# Patient Record
Sex: FEMALE | Race: WHITE | Employment: OTHER | URBAN - METROPOLITAN AREA
[De-identification: names, ages, dates, MRNs, and addresses within clinical notes are randomized per-mention and may not be internally consistent; named-entity substitution may affect disease eponyms.]

---

## 2019-10-03 ASSESSMENT — ENCOUNTER SYMPTOMS
NAUSEA: 0
CONSTIPATION: 0
DIARRHEA: 0
SORE THROAT: 0
SHORTNESS OF BREATH: 0
VOMITING: 0
BACK PAIN: 0
SINUS PAIN: 0
WHEEZING: 0
COUGH: 0
BLOOD IN STOOL: 0

## 2019-10-07 ENCOUNTER — OFFICE VISIT (OUTPATIENT)
Dept: INTERNAL MEDICINE CLINIC | Age: 59
End: 2019-10-07
Payer: MEDICARE

## 2019-10-07 VITALS
WEIGHT: 265 LBS | HEART RATE: 63 BPM | SYSTOLIC BLOOD PRESSURE: 128 MMHG | OXYGEN SATURATION: 97 % | DIASTOLIC BLOOD PRESSURE: 86 MMHG | HEIGHT: 64 IN | BODY MASS INDEX: 45.24 KG/M2

## 2019-10-07 DIAGNOSIS — K21.9 GASTROESOPHAGEAL REFLUX DISEASE, ESOPHAGITIS PRESENCE NOT SPECIFIED: ICD-10-CM

## 2019-10-07 DIAGNOSIS — Z76.89 ENCOUNTER TO ESTABLISH CARE WITH NEW DOCTOR: Primary | ICD-10-CM

## 2019-10-07 DIAGNOSIS — E78.49 OTHER HYPERLIPIDEMIA: ICD-10-CM

## 2019-10-07 DIAGNOSIS — J30.89 SEASONAL ALLERGIC RHINITIS DUE TO OTHER ALLERGIC TRIGGER: ICD-10-CM

## 2019-10-07 DIAGNOSIS — M79.7 FIBROMYALGIA: ICD-10-CM

## 2019-10-07 DIAGNOSIS — F32.89 OTHER DEPRESSION: ICD-10-CM

## 2019-10-07 DIAGNOSIS — E03.8 OTHER SPECIFIED HYPOTHYROIDISM: ICD-10-CM

## 2019-10-07 DIAGNOSIS — R73.03 PREDIABETES: ICD-10-CM

## 2019-10-07 DIAGNOSIS — Z13.31 POSITIVE DEPRESSION SCREENING: ICD-10-CM

## 2019-10-07 DIAGNOSIS — Z12.31 BREAST CANCER SCREENING BY MAMMOGRAM: ICD-10-CM

## 2019-10-07 DIAGNOSIS — Z11.4 SCREENING FOR HIV (HUMAN IMMUNODEFICIENCY VIRUS): ICD-10-CM

## 2019-10-07 DIAGNOSIS — J45.909 UNCOMPLICATED ASTHMA, UNSPECIFIED ASTHMA SEVERITY, UNSPECIFIED WHETHER PERSISTENT: ICD-10-CM

## 2019-10-07 DIAGNOSIS — G47.39 OTHER SLEEP APNEA: ICD-10-CM

## 2019-10-07 DIAGNOSIS — I10 ESSENTIAL HYPERTENSION: ICD-10-CM

## 2019-10-07 PROCEDURE — 99386 PREV VISIT NEW AGE 40-64: CPT | Performed by: NURSE PRACTITIONER

## 2019-10-07 PROCEDURE — G8431 POS CLIN DEPRES SCRN F/U DOC: HCPCS | Performed by: NURSE PRACTITIONER

## 2019-10-07 PROCEDURE — G8484 FLU IMMUNIZE NO ADMIN: HCPCS | Performed by: NURSE PRACTITIONER

## 2019-10-07 RX ORDER — MONTELUKAST SODIUM 4 MG/1
1 TABLET, CHEWABLE ORAL
COMMUNITY
Start: 2019-07-29 | End: 2020-01-08 | Stop reason: SDUPTHER

## 2019-10-07 RX ORDER — METOPROLOL SUCCINATE 25 MG/1
25 TABLET, EXTENDED RELEASE ORAL 2 TIMES DAILY
COMMUNITY
Start: 2019-07-29 | End: 2020-01-08 | Stop reason: SDUPTHER

## 2019-10-07 RX ORDER — LEVOTHYROXINE SODIUM 112 UG/1
112 TABLET ORAL DAILY
COMMUNITY
Start: 2019-06-20 | End: 2020-01-08 | Stop reason: SDUPTHER

## 2019-10-07 RX ORDER — GABAPENTIN 300 MG/1
300 CAPSULE ORAL 3 TIMES DAILY
COMMUNITY
Start: 2019-08-20 | End: 2019-10-23 | Stop reason: SDUPTHER

## 2019-10-07 RX ORDER — DULOXETIN HYDROCHLORIDE 60 MG/1
60 CAPSULE, DELAYED RELEASE ORAL DAILY
COMMUNITY
Start: 2019-06-20 | End: 2019-12-04 | Stop reason: SDUPTHER

## 2019-10-07 RX ORDER — PANTOPRAZOLE SODIUM 40 MG/1
1 TABLET, DELAYED RELEASE ORAL DAILY
COMMUNITY
Start: 2019-09-03 | End: 2019-10-23

## 2019-10-07 RX ORDER — BUSPIRONE HYDROCHLORIDE 10 MG/1
10 TABLET ORAL DAILY
COMMUNITY
Start: 2019-09-03 | End: 2020-01-08

## 2019-10-07 ASSESSMENT — PATIENT HEALTH QUESTIONNAIRE - PHQ9
3. TROUBLE FALLING OR STAYING ASLEEP: 1
SUM OF ALL RESPONSES TO PHQ QUESTIONS 1-9: 10
9. THOUGHTS THAT YOU WOULD BE BETTER OFF DEAD, OR OF HURTING YOURSELF: 0
8. MOVING OR SPEAKING SO SLOWLY THAT OTHER PEOPLE COULD HAVE NOTICED. OR THE OPPOSITE, BEING SO FIGETY OR RESTLESS THAT YOU HAVE BEEN MOVING AROUND A LOT MORE THAN USUAL: 0
SUM OF ALL RESPONSES TO PHQ QUESTIONS 1-9: 10
1. LITTLE INTEREST OR PLEASURE IN DOING THINGS: 1
4. FEELING TIRED OR HAVING LITTLE ENERGY: 2
SUM OF ALL RESPONSES TO PHQ9 QUESTIONS 1 & 2: 2
7. TROUBLE CONCENTRATING ON THINGS, SUCH AS READING THE NEWSPAPER OR WATCHING TELEVISION: 0
6. FEELING BAD ABOUT YOURSELF - OR THAT YOU ARE A FAILURE OR HAVE LET YOURSELF OR YOUR FAMILY DOWN: 3
5. POOR APPETITE OR OVEREATING: 2
10. IF YOU CHECKED OFF ANY PROBLEMS, HOW DIFFICULT HAVE THESE PROBLEMS MADE IT FOR YOU TO DO YOUR WORK, TAKE CARE OF THINGS AT HOME, OR GET ALONG WITH OTHER PEOPLE: 1
2. FEELING DOWN, DEPRESSED OR HOPELESS: 1

## 2019-10-16 DIAGNOSIS — E78.49 OTHER HYPERLIPIDEMIA: ICD-10-CM

## 2019-10-16 DIAGNOSIS — Z11.4 SCREENING FOR HIV (HUMAN IMMUNODEFICIENCY VIRUS): ICD-10-CM

## 2019-10-16 DIAGNOSIS — M79.7 FIBROMYALGIA: ICD-10-CM

## 2019-10-16 DIAGNOSIS — E03.8 OTHER SPECIFIED HYPOTHYROIDISM: ICD-10-CM

## 2019-10-16 DIAGNOSIS — I10 ESSENTIAL HYPERTENSION: ICD-10-CM

## 2019-10-16 DIAGNOSIS — R73.03 PREDIABETES: ICD-10-CM

## 2019-10-16 LAB
A/G RATIO: 2.3 (ref 1.1–2.2)
ALBUMIN SERPL-MCNC: 4.5 G/DL (ref 3.4–5)
ALP BLD-CCNC: 83 U/L (ref 40–129)
ALT SERPL-CCNC: 19 U/L (ref 10–40)
ANION GAP SERPL CALCULATED.3IONS-SCNC: 13 MMOL/L (ref 3–16)
AST SERPL-CCNC: 17 U/L (ref 15–37)
BASOPHILS ABSOLUTE: 0.1 K/UL (ref 0–0.2)
BASOPHILS RELATIVE PERCENT: 1.2 %
BILIRUB SERPL-MCNC: 0.3 MG/DL (ref 0–1)
BUN BLDV-MCNC: 12 MG/DL (ref 7–20)
C-REACTIVE PROTEIN: 3.1 MG/L (ref 0–5.1)
CALCIUM SERPL-MCNC: 9.8 MG/DL (ref 8.3–10.6)
CHLORIDE BLD-SCNC: 103 MMOL/L (ref 99–110)
CHOLESTEROL, FASTING: 214 MG/DL (ref 0–199)
CO2: 26 MMOL/L (ref 21–32)
CREAT SERPL-MCNC: 0.7 MG/DL (ref 0.6–1.1)
EOSINOPHILS ABSOLUTE: 0.1 K/UL (ref 0–0.6)
EOSINOPHILS RELATIVE PERCENT: 1.3 %
GFR AFRICAN AMERICAN: >60
GFR NON-AFRICAN AMERICAN: >60
GLOBULIN: 2 G/DL
GLUCOSE BLD-MCNC: 89 MG/DL (ref 70–99)
HCT VFR BLD CALC: 38 % (ref 36–48)
HDLC SERPL-MCNC: 65 MG/DL (ref 40–60)
HEMOGLOBIN: 12.4 G/DL (ref 12–16)
LDL CHOLESTEROL CALCULATED: 109 MG/DL
LYMPHOCYTES ABSOLUTE: 1.5 K/UL (ref 1–5.1)
LYMPHOCYTES RELATIVE PERCENT: 33.2 %
MCH RBC QN AUTO: 29 PG (ref 26–34)
MCHC RBC AUTO-ENTMCNC: 32.5 G/DL (ref 31–36)
MCV RBC AUTO: 89 FL (ref 80–100)
MONOCYTES ABSOLUTE: 0.3 K/UL (ref 0–1.3)
MONOCYTES RELATIVE PERCENT: 7 %
NEUTROPHILS ABSOLUTE: 2.6 K/UL (ref 1.7–7.7)
NEUTROPHILS RELATIVE PERCENT: 57.3 %
PDW BLD-RTO: 14.3 % (ref 12.4–15.4)
PLATELET # BLD: 243 K/UL (ref 135–450)
PMV BLD AUTO: 8.1 FL (ref 5–10.5)
POTASSIUM SERPL-SCNC: 4.5 MMOL/L (ref 3.5–5.1)
RBC # BLD: 4.27 M/UL (ref 4–5.2)
RHEUMATOID FACTOR: <10 IU/ML
SODIUM BLD-SCNC: 142 MMOL/L (ref 136–145)
TOTAL PROTEIN: 6.5 G/DL (ref 6.4–8.2)
TRIGLYCERIDE, FASTING: 198 MG/DL (ref 0–150)
TSH REFLEX FT4: 2.97 UIU/ML (ref 0.27–4.2)
VLDLC SERPL CALC-MCNC: 40 MG/DL
WBC # BLD: 4.5 K/UL (ref 4–11)

## 2019-10-17 LAB
CYCLIC CITRULLINATED PEPTIDE ANTIBODY IGG: <0.5 U/ML (ref 0–2.9)
ESTIMATED AVERAGE GLUCOSE: 122.6 MG/DL
HBA1C MFR BLD: 5.9 %
HIV AG/AB: NORMAL
HIV ANTIGEN: NORMAL
HIV-1 ANTIBODY: NORMAL
HIV-2 AB: NORMAL

## 2019-10-23 ENCOUNTER — OFFICE VISIT (OUTPATIENT)
Dept: RHEUMATOLOGY | Age: 59
End: 2019-10-23
Payer: MEDICARE

## 2019-10-23 VITALS
SYSTOLIC BLOOD PRESSURE: 144 MMHG | BODY MASS INDEX: 45.95 KG/M2 | HEIGHT: 64 IN | WEIGHT: 269.13 LBS | HEART RATE: 64 BPM | DIASTOLIC BLOOD PRESSURE: 80 MMHG

## 2019-10-23 DIAGNOSIS — G47.9 DISORDERED SLEEP: ICD-10-CM

## 2019-10-23 DIAGNOSIS — R53.82 CHRONIC FATIGUE: ICD-10-CM

## 2019-10-23 DIAGNOSIS — M79.10 MYALGIA: Primary | ICD-10-CM

## 2019-10-23 LAB
TOTAL CK: 125 U/L (ref 26–192)
TSH SERPL DL<=0.05 MIU/L-ACNC: 2.9 UIU/ML (ref 0.27–4.2)
VITAMIN D 25-HYDROXY: 16.8 NG/ML

## 2019-10-23 PROCEDURE — G8484 FLU IMMUNIZE NO ADMIN: HCPCS | Performed by: INTERNAL MEDICINE

## 2019-10-23 PROCEDURE — G8427 DOCREV CUR MEDS BY ELIG CLIN: HCPCS | Performed by: INTERNAL MEDICINE

## 2019-10-23 PROCEDURE — 1036F TOBACCO NON-USER: CPT | Performed by: INTERNAL MEDICINE

## 2019-10-23 PROCEDURE — G8417 CALC BMI ABV UP PARAM F/U: HCPCS | Performed by: INTERNAL MEDICINE

## 2019-10-23 PROCEDURE — 3017F COLORECTAL CA SCREEN DOC REV: CPT | Performed by: INTERNAL MEDICINE

## 2019-10-23 PROCEDURE — 99204 OFFICE O/P NEW MOD 45 MIN: CPT | Performed by: INTERNAL MEDICINE

## 2019-10-23 RX ORDER — GABAPENTIN 300 MG/1
300 CAPSULE ORAL 3 TIMES DAILY
Qty: 270 CAPSULE | Refills: 0 | Status: SHIPPED | OUTPATIENT
Start: 2019-10-23 | End: 2019-10-24 | Stop reason: SDUPTHER

## 2019-10-24 ENCOUNTER — TELEPHONE (OUTPATIENT)
Dept: RHEUMATOLOGY | Age: 59
End: 2019-10-24

## 2019-10-24 DIAGNOSIS — M79.10 MYALGIA: ICD-10-CM

## 2019-10-24 DIAGNOSIS — R53.82 CHRONIC FATIGUE: ICD-10-CM

## 2019-10-24 RX ORDER — GABAPENTIN 300 MG/1
300 CAPSULE ORAL 3 TIMES DAILY
Qty: 21 CAPSULE | Refills: 0 | Status: SHIPPED | OUTPATIENT
Start: 2019-10-24 | End: 2019-12-04 | Stop reason: SDUPTHER

## 2019-10-24 RX ORDER — ERGOCALCIFEROL 1.25 MG/1
50000 CAPSULE ORAL WEEKLY
Qty: 12 CAPSULE | Refills: 0 | Status: SHIPPED | OUTPATIENT
Start: 2019-10-24 | End: 2019-12-04 | Stop reason: SDUPTHER

## 2019-10-25 ENCOUNTER — TELEPHONE (OUTPATIENT)
Dept: RHEUMATOLOGY | Age: 59
End: 2019-10-25

## 2019-10-31 ENCOUNTER — TELEPHONE (OUTPATIENT)
Dept: INTERNAL MEDICINE CLINIC | Age: 59
End: 2019-10-31

## 2019-11-07 ENCOUNTER — OFFICE VISIT (OUTPATIENT)
Dept: INTERNAL MEDICINE CLINIC | Age: 59
End: 2019-11-07
Payer: MEDICARE

## 2019-11-07 VITALS
WEIGHT: 267 LBS | BODY MASS INDEX: 45.58 KG/M2 | DIASTOLIC BLOOD PRESSURE: 82 MMHG | SYSTOLIC BLOOD PRESSURE: 124 MMHG | HEART RATE: 58 BPM | OXYGEN SATURATION: 98 % | HEIGHT: 64 IN

## 2019-11-07 DIAGNOSIS — H91.93 CHANGE IN HEARING, BILATERAL: ICD-10-CM

## 2019-11-07 DIAGNOSIS — Z23 NEED FOR INFLUENZA VACCINATION: Primary | ICD-10-CM

## 2019-11-07 DIAGNOSIS — Z12.11 SCREEN FOR COLON CANCER: ICD-10-CM

## 2019-11-07 DIAGNOSIS — I49.9 IRREGULAR HEART RATE: ICD-10-CM

## 2019-11-07 DIAGNOSIS — Z00.00 ROUTINE GENERAL MEDICAL EXAMINATION AT A HEALTH CARE FACILITY: ICD-10-CM

## 2019-11-07 PROCEDURE — G8482 FLU IMMUNIZE ORDER/ADMIN: HCPCS | Performed by: NURSE PRACTITIONER

## 2019-11-07 PROCEDURE — G0438 PPPS, INITIAL VISIT: HCPCS | Performed by: NURSE PRACTITIONER

## 2019-11-07 PROCEDURE — 90674 CCIIV4 VAC NO PRSV 0.5 ML IM: CPT | Performed by: NURSE PRACTITIONER

## 2019-11-07 PROCEDURE — 90471 IMMUNIZATION ADMIN: CPT | Performed by: NURSE PRACTITIONER

## 2019-11-07 PROCEDURE — 3017F COLORECTAL CA SCREEN DOC REV: CPT | Performed by: NURSE PRACTITIONER

## 2019-11-07 PROCEDURE — 93000 ELECTROCARDIOGRAM COMPLETE: CPT | Performed by: NURSE PRACTITIONER

## 2019-11-07 RX ORDER — SENNOSIDES 8.6 MG
650 CAPSULE ORAL EVERY 8 HOURS PRN
COMMUNITY

## 2019-11-07 ASSESSMENT — LIFESTYLE VARIABLES
HOW OFTEN DO YOU HAVE A DRINK CONTAINING ALCOHOL: 2
HOW OFTEN DURING THE LAST YEAR HAVE YOU FAILED TO DO WHAT WAS NORMALLY EXPECTED FROM YOU BECAUSE OF DRINKING: 0
HAS A RELATIVE, FRIEND, DOCTOR, OR ANOTHER HEALTH PROFESSIONAL EXPRESSED CONCERN ABOUT YOUR DRINKING OR SUGGESTED YOU CUT DOWN: 0
HOW OFTEN DURING THE LAST YEAR HAVE YOU HAD A FEELING OF GUILT OR REMORSE AFTER DRINKING: 0
HOW OFTEN DURING THE LAST YEAR HAVE YOU NEEDED AN ALCOHOLIC DRINK FIRST THING IN THE MORNING TO GET YOURSELF GOING AFTER A NIGHT OF HEAVY DRINKING: 0
HAVE YOU OR SOMEONE ELSE BEEN INJURED AS A RESULT OF YOUR DRINKING: 0
HOW OFTEN DURING THE LAST YEAR HAVE YOU BEEN UNABLE TO REMEMBER WHAT HAPPENED THE NIGHT BEFORE BECAUSE YOU HAD BEEN DRINKING: 0
HOW OFTEN DURING THE LAST YEAR HAVE YOU FOUND THAT YOU WERE NOT ABLE TO STOP DRINKING ONCE YOU HAD STARTED: 0
HOW MANY STANDARD DRINKS CONTAINING ALCOHOL DO YOU HAVE ON A TYPICAL DAY: 0

## 2019-11-07 ASSESSMENT — PATIENT HEALTH QUESTIONNAIRE - PHQ9
SUM OF ALL RESPONSES TO PHQ QUESTIONS 1-9: 1
SUM OF ALL RESPONSES TO PHQ QUESTIONS 1-9: 1

## 2019-11-18 ENCOUNTER — TELEPHONE (OUTPATIENT)
Dept: INTERNAL MEDICINE CLINIC | Age: 59
End: 2019-11-18

## 2019-11-18 NOTE — TELEPHONE ENCOUNTER
Patient states Savanna Smith gave her an order for a screening colonoscopy. She states she needs a referral to gastroenterologist.  Patient has Tashia Laguna O.

## 2019-11-21 ENCOUNTER — ANESTHESIA EVENT (OUTPATIENT)
Dept: ENDOSCOPY | Age: 59
End: 2019-11-21
Payer: MEDICARE

## 2019-11-27 ENCOUNTER — HOSPITAL ENCOUNTER (OUTPATIENT)
Age: 59
Setting detail: OUTPATIENT SURGERY
Discharge: HOME | End: 2019-11-27
Attending: INTERNAL MEDICINE | Admitting: INTERNAL MEDICINE
Payer: MEDICARE

## 2019-11-27 ENCOUNTER — ANESTHESIA (OUTPATIENT)
Dept: ENDOSCOPY | Age: 59
End: 2019-11-27
Payer: MEDICARE

## 2019-11-27 VITALS
RESPIRATION RATE: 21 BRPM | DIASTOLIC BLOOD PRESSURE: 66 MMHG | SYSTOLIC BLOOD PRESSURE: 122 MMHG | OXYGEN SATURATION: 95 %

## 2019-11-27 VITALS
OXYGEN SATURATION: 100 % | DIASTOLIC BLOOD PRESSURE: 77 MMHG | HEART RATE: 61 BPM | TEMPERATURE: 98.1 F | HEIGHT: 64 IN | RESPIRATION RATE: 16 BRPM | SYSTOLIC BLOOD PRESSURE: 116 MMHG | BODY MASS INDEX: 45.93 KG/M2 | WEIGHT: 269 LBS

## 2019-11-27 PROCEDURE — 3609010600 HC COLONOSCOPY POLYPECTOMY SNARE/COLD BIOPSY: Performed by: INTERNAL MEDICINE

## 2019-11-27 PROCEDURE — 3700000001 HC ADD 15 MINUTES (ANESTHESIA): Performed by: INTERNAL MEDICINE

## 2019-11-27 PROCEDURE — 2709999900 HC NON-CHARGEABLE SUPPLY: Performed by: INTERNAL MEDICINE

## 2019-11-27 PROCEDURE — 2500000003 HC RX 250 WO HCPCS: Performed by: NURSE ANESTHETIST, CERTIFIED REGISTERED

## 2019-11-27 PROCEDURE — 2580000003 HC RX 258: Performed by: NURSE ANESTHETIST, CERTIFIED REGISTERED

## 2019-11-27 PROCEDURE — 7100000010 HC PHASE II RECOVERY - FIRST 15 MIN: Performed by: INTERNAL MEDICINE

## 2019-11-27 PROCEDURE — 2580000003 HC RX 258: Performed by: ANESTHESIOLOGY

## 2019-11-27 PROCEDURE — 7100000011 HC PHASE II RECOVERY - ADDTL 15 MIN: Performed by: INTERNAL MEDICINE

## 2019-11-27 PROCEDURE — 6360000002 HC RX W HCPCS: Performed by: NURSE ANESTHETIST, CERTIFIED REGISTERED

## 2019-11-27 PROCEDURE — 88305 TISSUE EXAM BY PATHOLOGIST: CPT

## 2019-11-27 PROCEDURE — 3700000000 HC ANESTHESIA ATTENDED CARE: Performed by: INTERNAL MEDICINE

## 2019-11-27 RX ORDER — SODIUM CHLORIDE 9 MG/ML
INJECTION, SOLUTION INTRAVENOUS CONTINUOUS PRN
Status: DISCONTINUED | OUTPATIENT
Start: 2019-11-27 | End: 2019-11-27 | Stop reason: SDUPTHER

## 2019-11-27 RX ORDER — PROMETHAZINE HYDROCHLORIDE 25 MG/ML
6.25 INJECTION, SOLUTION INTRAMUSCULAR; INTRAVENOUS
Status: DISCONTINUED | OUTPATIENT
Start: 2019-11-27 | End: 2019-11-27 | Stop reason: HOSPADM

## 2019-11-27 RX ORDER — SODIUM CHLORIDE 0.9 % (FLUSH) 0.9 %
10 SYRINGE (ML) INJECTION PRN
Status: DISCONTINUED | OUTPATIENT
Start: 2019-11-27 | End: 2019-11-27 | Stop reason: HOSPADM

## 2019-11-27 RX ORDER — LABETALOL HYDROCHLORIDE 5 MG/ML
5 INJECTION, SOLUTION INTRAVENOUS EVERY 10 MIN PRN
Status: DISCONTINUED | OUTPATIENT
Start: 2019-11-27 | End: 2019-11-27 | Stop reason: HOSPADM

## 2019-11-27 RX ORDER — PROPOFOL 10 MG/ML
INJECTION, EMULSION INTRAVENOUS CONTINUOUS PRN
Status: DISCONTINUED | OUTPATIENT
Start: 2019-11-27 | End: 2019-11-27 | Stop reason: SDUPTHER

## 2019-11-27 RX ORDER — SODIUM CHLORIDE 0.9 % (FLUSH) 0.9 %
10 SYRINGE (ML) INJECTION EVERY 12 HOURS SCHEDULED
Status: DISCONTINUED | OUTPATIENT
Start: 2019-11-27 | End: 2019-11-27 | Stop reason: HOSPADM

## 2019-11-27 RX ORDER — ONDANSETRON 2 MG/ML
4 INJECTION INTRAMUSCULAR; INTRAVENOUS
Status: DISCONTINUED | OUTPATIENT
Start: 2019-11-27 | End: 2019-11-27 | Stop reason: HOSPADM

## 2019-11-27 RX ORDER — SODIUM CHLORIDE 9 MG/ML
INJECTION, SOLUTION INTRAVENOUS CONTINUOUS
Status: DISCONTINUED | OUTPATIENT
Start: 2019-11-27 | End: 2019-11-27 | Stop reason: HOSPADM

## 2019-11-27 RX ORDER — LIDOCAINE HYDROCHLORIDE 20 MG/ML
INJECTION, SOLUTION INFILTRATION; PERINEURAL PRN
Status: DISCONTINUED | OUTPATIENT
Start: 2019-11-27 | End: 2019-11-27 | Stop reason: SDUPTHER

## 2019-11-27 RX ORDER — LIDOCAINE HYDROCHLORIDE 10 MG/ML
1 INJECTION, SOLUTION EPIDURAL; INFILTRATION; INTRACAUDAL; PERINEURAL
Status: DISCONTINUED | OUTPATIENT
Start: 2019-11-27 | End: 2019-11-27 | Stop reason: HOSPADM

## 2019-11-27 RX ADMIN — PROPOFOL 120 MCG/KG/MIN: 10 INJECTION, EMULSION INTRAVENOUS at 12:12

## 2019-11-27 RX ADMIN — SODIUM CHLORIDE: 9 INJECTION, SOLUTION INTRAVENOUS at 11:38

## 2019-11-27 RX ADMIN — LIDOCAINE HYDROCHLORIDE 100 MG: 20 INJECTION, SOLUTION INFILTRATION; PERINEURAL at 12:12

## 2019-11-27 RX ADMIN — SODIUM CHLORIDE: 9 INJECTION, SOLUTION INTRAVENOUS at 12:10

## 2019-11-27 ASSESSMENT — PAIN SCALES - GENERAL
PAINLEVEL_OUTOF10: 0

## 2019-11-27 ASSESSMENT — PAIN - FUNCTIONAL ASSESSMENT: PAIN_FUNCTIONAL_ASSESSMENT: 0-10

## 2019-12-04 ENCOUNTER — OFFICE VISIT (OUTPATIENT)
Dept: RHEUMATOLOGY | Age: 59
End: 2019-12-04
Payer: MEDICARE

## 2019-12-04 VITALS
SYSTOLIC BLOOD PRESSURE: 110 MMHG | DIASTOLIC BLOOD PRESSURE: 64 MMHG | BODY MASS INDEX: 46.5 KG/M2 | HEART RATE: 72 BPM | HEIGHT: 64 IN | WEIGHT: 272.38 LBS

## 2019-12-04 DIAGNOSIS — R53.82 CHRONIC FATIGUE: ICD-10-CM

## 2019-12-04 DIAGNOSIS — M79.10 MYALGIA: ICD-10-CM

## 2019-12-04 DIAGNOSIS — M79.7 FIBROMYALGIA: Primary | ICD-10-CM

## 2019-12-04 DIAGNOSIS — E55.9 VITAMIN D DEFICIENCY: ICD-10-CM

## 2019-12-04 PROCEDURE — G8482 FLU IMMUNIZE ORDER/ADMIN: HCPCS | Performed by: INTERNAL MEDICINE

## 2019-12-04 PROCEDURE — G8417 CALC BMI ABV UP PARAM F/U: HCPCS | Performed by: INTERNAL MEDICINE

## 2019-12-04 PROCEDURE — 3017F COLORECTAL CA SCREEN DOC REV: CPT | Performed by: INTERNAL MEDICINE

## 2019-12-04 PROCEDURE — 1036F TOBACCO NON-USER: CPT | Performed by: INTERNAL MEDICINE

## 2019-12-04 PROCEDURE — G8427 DOCREV CUR MEDS BY ELIG CLIN: HCPCS | Performed by: INTERNAL MEDICINE

## 2019-12-04 PROCEDURE — 99213 OFFICE O/P EST LOW 20 MIN: CPT | Performed by: INTERNAL MEDICINE

## 2019-12-04 RX ORDER — ERGOCALCIFEROL 1.25 MG/1
50000 CAPSULE ORAL WEEKLY
Qty: 12 CAPSULE | Refills: 0 | Status: SHIPPED | OUTPATIENT
Start: 2019-12-04 | End: 2020-03-12 | Stop reason: SDUPTHER

## 2019-12-04 RX ORDER — DULOXETIN HYDROCHLORIDE 60 MG/1
60 CAPSULE, DELAYED RELEASE ORAL DAILY
Qty: 90 CAPSULE | Refills: 0 | Status: SHIPPED | OUTPATIENT
Start: 2019-12-04 | End: 2020-03-12 | Stop reason: SDUPTHER

## 2019-12-04 RX ORDER — GABAPENTIN 300 MG/1
600 CAPSULE ORAL 3 TIMES DAILY
Qty: 540 CAPSULE | Refills: 0 | Status: SHIPPED | OUTPATIENT
Start: 2019-12-04 | End: 2020-08-04 | Stop reason: SDUPTHER

## 2019-12-05 ENCOUNTER — TELEPHONE (OUTPATIENT)
Dept: INTERNAL MEDICINE CLINIC | Age: 59
End: 2019-12-05

## 2019-12-05 LAB — VITAMIN D 25-HYDROXY: 43.9 NG/ML

## 2019-12-09 ENCOUNTER — HOSPITAL ENCOUNTER (OUTPATIENT)
Dept: WOMENS IMAGING | Age: 59
Discharge: HOME | End: 2019-12-09
Payer: MEDICARE

## 2019-12-09 DIAGNOSIS — Z12.31 BREAST CANCER SCREENING BY MAMMOGRAM: ICD-10-CM

## 2019-12-09 PROCEDURE — 77063 BREAST TOMOSYNTHESIS BI: CPT

## 2020-01-08 ENCOUNTER — OFFICE VISIT (OUTPATIENT)
Dept: INTERNAL MEDICINE CLINIC | Age: 60
End: 2020-01-08
Payer: MEDICARE

## 2020-01-08 VITALS
WEIGHT: 271 LBS | OXYGEN SATURATION: 98 % | HEART RATE: 67 BPM | SYSTOLIC BLOOD PRESSURE: 136 MMHG | DIASTOLIC BLOOD PRESSURE: 84 MMHG | BODY MASS INDEX: 46.52 KG/M2

## 2020-01-08 PROCEDURE — G8482 FLU IMMUNIZE ORDER/ADMIN: HCPCS | Performed by: NURSE PRACTITIONER

## 2020-01-08 PROCEDURE — G8427 DOCREV CUR MEDS BY ELIG CLIN: HCPCS | Performed by: NURSE PRACTITIONER

## 2020-01-08 PROCEDURE — 3017F COLORECTAL CA SCREEN DOC REV: CPT | Performed by: NURSE PRACTITIONER

## 2020-01-08 PROCEDURE — 99214 OFFICE O/P EST MOD 30 MIN: CPT | Performed by: NURSE PRACTITIONER

## 2020-01-08 PROCEDURE — 1036F TOBACCO NON-USER: CPT | Performed by: NURSE PRACTITIONER

## 2020-01-08 PROCEDURE — G8417 CALC BMI ABV UP PARAM F/U: HCPCS | Performed by: NURSE PRACTITIONER

## 2020-01-08 PROCEDURE — G0444 DEPRESSION SCREEN ANNUAL: HCPCS | Performed by: NURSE PRACTITIONER

## 2020-01-08 RX ORDER — METOPROLOL SUCCINATE 25 MG/1
25 TABLET, EXTENDED RELEASE ORAL 2 TIMES DAILY
Qty: 180 TABLET | Refills: 0 | Status: SHIPPED | OUTPATIENT
Start: 2020-01-08 | End: 2020-05-12 | Stop reason: SDUPTHER

## 2020-01-08 RX ORDER — LEVOTHYROXINE SODIUM 112 UG/1
112 TABLET ORAL DAILY
Qty: 90 TABLET | Refills: 0 | Status: SHIPPED | OUTPATIENT
Start: 2020-01-08 | End: 2020-05-12 | Stop reason: SDUPTHER

## 2020-01-08 RX ORDER — MONTELUKAST SODIUM 10 MG/1
10 TABLET ORAL NIGHTLY
Qty: 90 TABLET | Refills: 0 | Status: SHIPPED | OUTPATIENT
Start: 2020-01-08 | End: 2020-05-12 | Stop reason: SDUPTHER

## 2020-01-08 RX ORDER — MONTELUKAST SODIUM 4 MG/1
1 TABLET, CHEWABLE ORAL 2 TIMES DAILY
Qty: 180 TABLET | Refills: 0 | Status: SHIPPED | OUTPATIENT
Start: 2020-01-08 | End: 2020-05-12 | Stop reason: SDUPTHER

## 2020-01-08 SDOH — ECONOMIC STABILITY: INCOME INSECURITY: HOW HARD IS IT FOR YOU TO PAY FOR THE VERY BASICS LIKE FOOD, HOUSING, MEDICAL CARE, AND HEATING?: NOT HARD AT ALL

## 2020-01-08 SDOH — ECONOMIC STABILITY: FOOD INSECURITY: WITHIN THE PAST 12 MONTHS, THE FOOD YOU BOUGHT JUST DIDN'T LAST AND YOU DIDN'T HAVE MONEY TO GET MORE.: NEVER TRUE

## 2020-01-08 SDOH — ECONOMIC STABILITY: FOOD INSECURITY: WITHIN THE PAST 12 MONTHS, YOU WORRIED THAT YOUR FOOD WOULD RUN OUT BEFORE YOU GOT MONEY TO BUY MORE.: NEVER TRUE

## 2020-01-08 SDOH — ECONOMIC STABILITY: TRANSPORTATION INSECURITY
IN THE PAST 12 MONTHS, HAS LACK OF TRANSPORTATION KEPT YOU FROM MEETINGS, WORK, OR FROM GETTING THINGS NEEDED FOR DAILY LIVING?: NO

## 2020-01-08 SDOH — ECONOMIC STABILITY: TRANSPORTATION INSECURITY
IN THE PAST 12 MONTHS, HAS THE LACK OF TRANSPORTATION KEPT YOU FROM MEDICAL APPOINTMENTS OR FROM GETTING MEDICATIONS?: NO

## 2020-01-08 ASSESSMENT — ENCOUNTER SYMPTOMS
DIARRHEA: 0
SHORTNESS OF BREATH: 0
VOMITING: 0
COUGH: 0
CONSTIPATION: 0
ABDOMINAL PAIN: 0
NAUSEA: 0
WHEEZING: 0

## 2020-01-08 ASSESSMENT — PATIENT HEALTH QUESTIONNAIRE - PHQ9
SUM OF ALL RESPONSES TO PHQ QUESTIONS 1-9: 3
10. IF YOU CHECKED OFF ANY PROBLEMS, HOW DIFFICULT HAVE THESE PROBLEMS MADE IT FOR YOU TO DO YOUR WORK, TAKE CARE OF THINGS AT HOME, OR GET ALONG WITH OTHER PEOPLE: 0
SUM OF ALL RESPONSES TO PHQ9 QUESTIONS 1 & 2: 2
4. FEELING TIRED OR HAVING LITTLE ENERGY: 1
6. FEELING BAD ABOUT YOURSELF - OR THAT YOU ARE A FAILURE OR HAVE LET YOURSELF OR YOUR FAMILY DOWN: 0
9. THOUGHTS THAT YOU WOULD BE BETTER OFF DEAD, OR OF HURTING YOURSELF: 0
2. FEELING DOWN, DEPRESSED OR HOPELESS: 1
SUM OF ALL RESPONSES TO PHQ QUESTIONS 1-9: 3
8. MOVING OR SPEAKING SO SLOWLY THAT OTHER PEOPLE COULD HAVE NOTICED. OR THE OPPOSITE, BEING SO FIGETY OR RESTLESS THAT YOU HAVE BEEN MOVING AROUND A LOT MORE THAN USUAL: 0
7. TROUBLE CONCENTRATING ON THINGS, SUCH AS READING THE NEWSPAPER OR WATCHING TELEVISION: 0
1. LITTLE INTEREST OR PLEASURE IN DOING THINGS: 1
5. POOR APPETITE OR OVEREATING: 0
3. TROUBLE FALLING OR STAYING ASLEEP: 0

## 2020-01-08 NOTE — PROGRESS NOTES
Office Visit  1/8/2020    Subjective:  Chief Complaint   Patient presents with    Weight Loss     questions    Hypertension     discuss meds      HPI:  Estephania Wheatley is a 61 y.o. female who presents to the clinic today for follow up. Mood- Pt takes cymbalta 60 mg daily and buspar 10 mg daily. She ran out of Buspar two weeks ago- has not noticed a difference. She states her mood is doing well. No SI/HI. Not seeing a psychologist- not interested.     GERD- she stopped protonix two weeks ago. Asymptomatic. She would like to continue off of these medications. Prediabetes- Diagnosed last visit. Pt has been walking more and riding a bike. She reports that her diet is balanced- more vegetables. Patient reports that she is overweight and she would like to lose weight. Sleep apnea- Uses CPAP with relief.     Allergic rhinitis- Takes antihistamine and singulair with relief.     Asthma- uses symbicort daily and albuterol PRN. Has not needed albuterol since last visit. Takes symbicort in spring and fall. No trouble breathing/SOB/wheezing.     Fibromyalgia- Takes Cymbalta and gabapentin 300 mg TID and vit D3. Tried lyrica with side effects. Chronic joint pains. Follows with rheumatology.      Hyperlipidemia- Takes colestipol 1 gram BID and CoQ-10. States she tried 3-4 statins and \"they made me crazy. \" She reports not being interested in stains.     Hypothyroidism- Used to take synthroid 112 mcg for years. Asymptomatic. No side effects.     HTN- Takes metoprolol 25 mg BID for heart palpitations. States she ran out of this medication. She is no longer taking lisinopril. Had palpitations once since last visit not taking medication. No associated symptoms. None today. No chest pain, shortness of breath, trouble breathing, lightheadedness, dizziness or blurred vision. Review of Systems   Constitutional: Negative for chills, fatigue, fever and unexpected weight change. Eyes: Negative for visual disturbance. Respiratory: Negative for cough, shortness of breath and wheezing. Cardiovascular: Negative for chest pain, palpitations and leg swelling. Gastrointestinal: Negative for abdominal pain, constipation, diarrhea, nausea and vomiting. Skin: Negative for pallor and rash. Neurological: Negative for dizziness, weakness, light-headedness, numbness and headaches. Psychiatric/Behavioral: Negative for dysphoric mood, self-injury, sleep disturbance and suicidal ideas. The patient is not nervous/anxious. Allergies   Allergen Reactions    Latex Rash    Statins Nausea Only     Current Outpatient Rx   Medication Sig Dispense Refill    metoprolol succinate (TOPROL XL) 25 MG extended release tablet Take 1 tablet by mouth 2 times daily 180 tablet 0    colestipol (COLESTID) 1 g tablet Take 1 tablet by mouth 2 times daily 180 tablet 0    montelukast (SINGULAIR) 10 MG tablet Take 1 tablet by mouth nightly 90 tablet 0    levothyroxine (SYNTHROID) 112 MCG tablet Take 1 tablet by mouth daily 90 tablet 0    DULoxetine (CYMBALTA) 60 MG extended release capsule Take 1 capsule by mouth daily 90 capsule 0    vitamin D (ERGOCALCIFEROL) 1.25 MG (92895 UT) CAPS capsule Take 1 capsule by mouth once a week 12 capsule 0    gabapentin (NEURONTIN) 300 MG capsule Take 2 capsules by mouth 3 times daily for 90 days.  540 capsule 0    acetaminophen (TYLENOL 8 HOUR) 650 MG extended release tablet Take 650 mg by mouth every 8 hours as needed for Pain      Digestive Aids Mixture (DIGESTION GB PO) Take by mouth      Cetirizine HCl (ALL DAY ALLERGY PO) Take by mouth      Budesonide-Formoterol Fumarate (SYMBICORT IN) Inhale into the lungs      ALBUTEROL SULFATE IN Inhale into the lungs       Patient Active Problem List   Diagnosis    Hypertension    Hyperlipidemia    Back pain    Sleep apnea    Urinary incontinence    Morbid obesity with BMI of 40.0-44.9, adult (HCC)    Peripheral polyneuropathy    Neurogenic claudication person, place, and time. Coordination: Coordination normal.   Psychiatric:         Mood and Affect: Mood normal.         Behavior: Behavior normal.         Thought Content: Thought content normal.         Judgment: Judgment normal.       Assessment and Plan:  Eliot Cardona was seen today for weight loss and hypertension. Diagnoses and all orders for this visit:    Essential hypertension  -    Stopped taking this medication 2 weeks ago. Uses this to control palpitations. Has had 1 episode of palpitations since. None today. Asymptomatic. Blood pressure not to goal today. We will restart this medication at this time. Patient agreeable  - metoprolol succinate (TOPROL XL) 25 MG extended release tablet; Take 1 tablet by mouth 2 times daily    Fibromyalgia   - Continue with rheumatology. Vitamin D deficiency   - Taking vitamin D supplements per rheumatology. Prediabetes   - Lifestyle modifications such as exercise, weight loss and healthy diet encouraged and reviewed with the pt. - See below for weight management. Other depression   - The patient reports that she stopped taking BuSpar but is continuing to take Cymbalta. Did not notice a difference when she stopped taking BuSpar. She states that she is in a very good place and she would like to continue just on the Cymbalta. No suicidal or homicidal ideation. Other hyperlipidemia  -     Cholesterol was elevated while on this regimen. - Lifestyle modifications such as exercise, weight loss and healthy diet encouraged and reviewed with the pt. - Increase lifestyle modifications and continue current regimen  - colestipol (COLESTID) 1 g tablet; Take 1 tablet by mouth 2 times daily    Other specified hypothyroidism  -    Has taken this dose for years. TSH was stable. Will continue on the current regimen. Refilled today. - levothyroxine (SYNTHROID) 112 MCG tablet;  Take 1 tablet by mouth daily    Uncomplicated asthma, unspecified asthma severity, unspecified whether persistent   - Well-controlled at this time without inhalers. No trouble breathing/wheezing/shortness of breath. Uses daily inhaler in the fall and spring and albuterol as needed. Has not used either inhaler since her last visit. Seasonal allergic rhinitis due to other allergic trigger  -    Well-controlled with the current regimen. She would like refill singular today. - montelukast (SINGULAIR) 10 MG tablet; Take 1 tablet by mouth nightly    Other sleep apnea   - Well-controlled with her CPAP. Continue regimen. Gastroesophageal reflux disease, esophagitis presence not specified   - Patient reports that she stopped taking Protonix 2 weeks ago and has not noticed a difference. Would like to stay off this medication.   - Lifestyle modifications to prevent GERD were reviewed with the patient. Class 3 severe obesity due to excess calories without serious comorbidity with body mass index (BMI) of 45.0 to 49.9 in [facility])   - Lifestyle modifications such as exercise, weight loss and healthy diet encouraged and reviewed with the pt. - Weight management referral recommended. [facility] nonsurgical weight management has a waitlist of greater than 1 year. Patient is not interested in surgical weight management. Recommended  or [facility] nonsurgical weight management options. Phone numbers provided today. All questions were answered. At the end of the visit, the patient reports that she is having chronic right shoulder pain. She reports that she thought she had an MRI with her previous orthopedic and she is going to look at these records and see if she can find them and re-establish. Return in about 3 months (around 4/8/2020) for mood/GERD/A1C/TONY/HTN/HLD f/u, or sooner if needed. Pt will call if symptoms worsen or fail to improve. All questions answered. Pt states no further questions or concerns at this time.    Electronically signed by: Reyna Meyers 01/08/20

## 2020-02-24 NOTE — PROGRESS NOTES
Acute Office Visit  2/25/2020    SUBJECTIVE:    Patient ID: Sal Lujan is a 61 y.o. female. Chief Complaint   Patient presents with    Head Congestion     with cough, chills and diarrhea. HPI: The patient presents to the office for an acute visit. Patient reports dry cough (for 5 days), nasal congestion (1-2 weeks), right ear pain, chills (x1 month), fatigue, nausea without vomiting (1-2 weeks). Has had diarrhea for 1 week. Right flank pain, dysuria and urinary odor also present. No hematuria, nocturia, urinary frequency, urgency or hesitancy. No vaginal discharge or bleeding. No sore throat. Denies fevers. Denies abdominal pain. Denies CP, SOB or wheezing. No medication changes. Treatment tried and response - water intake  Recent ill contacts? No  Tobacco use or second hand smoke exposure -  No  Condition better, worsening, or stable - Stable    Allergies   Allergen Reactions    Latex Rash    Statins Nausea Only     Current Outpatient Medications   Medication Sig Dispense Refill    doxycycline hyclate (VIBRA-TABS) 100 MG tablet Take 1 tablet by mouth 2 times daily for 5 days 10 tablet 0    metoprolol succinate (TOPROL XL) 25 MG extended release tablet Take 1 tablet by mouth 2 times daily 180 tablet 0    colestipol (COLESTID) 1 g tablet Take 1 tablet by mouth 2 times daily 180 tablet 0    montelukast (SINGULAIR) 10 MG tablet Take 1 tablet by mouth nightly 90 tablet 0    levothyroxine (SYNTHROID) 112 MCG tablet Take 1 tablet by mouth daily 90 tablet 0    DULoxetine (CYMBALTA) 60 MG extended release capsule Take 1 capsule by mouth daily 90 capsule 0    vitamin D (ERGOCALCIFEROL) 1.25 MG (80714 UT) CAPS capsule Take 1 capsule by mouth once a week 12 capsule 0    gabapentin (NEURONTIN) 300 MG capsule Take 2 capsules by mouth 3 times daily for 90 days.  540 capsule 0    acetaminophen (TYLENOL 8 HOUR) 650 MG extended release tablet Take 650 mg by mouth every 8 hours as needed for Pain  Cetirizine HCl (ALL DAY ALLERGY PO) Take by mouth      Budesonide-Formoterol Fumarate (SYMBICORT IN) Inhale into the lungs      ALBUTEROL SULFATE IN Inhale into the lungs       No current facility-administered medications for this visit. Review of Systems   Constitutional: Positive for chills and fatigue. Negative for appetite change, diaphoresis and fever. HENT: Positive for ear pain, rhinorrhea and sinus pressure. Negative for congestion, drooling, ear discharge, hearing loss, postnasal drip, sinus pain, sneezing, sore throat and trouble swallowing. Eyes: Negative for pain and visual disturbance. Respiratory: Positive for cough. Negative for shortness of breath and wheezing. Cardiovascular: Negative for chest pain and palpitations. Gastrointestinal: Positive for diarrhea and nausea. Negative for abdominal pain, blood in stool, constipation and vomiting. Genitourinary: Positive for dysuria and flank pain (right). Negative for decreased urine volume, difficulty urinating, frequency, genital sores, hematuria, urgency, vaginal bleeding, vaginal discharge and vaginal pain. Urinary odor   Skin: Negative for pallor. Neurological: Negative for dizziness, light-headedness and headaches. OBJECTIVE:  /86   Pulse 60   Temp 98.6 °F (37 °C) (Oral)   Ht 5' 4\" (1.626 m)   Wt 265 lb (120.2 kg)   SpO2 100%   BMI 45.49 kg/m²    Physical Exam  Vitals signs reviewed. Constitutional:       General: She is not in acute distress. Appearance: She is well-developed. She is not diaphoretic. HENT:      Head: Normocephalic and atraumatic. Right Ear: Hearing and external ear normal.      Left Ear: Hearing, tympanic membrane and external ear normal.      Ears:      Comments: Right ear effusion noted     Nose: Nose normal.      Mouth/Throat:      Mouth: Mucous membranes are moist.      Pharynx: No oropharyngeal exudate. Eyes:      General: No scleral icterus.         Right eye: No discharge. Left eye: No discharge. Conjunctiva/sclera: Conjunctivae normal.      Pupils: Pupils are equal, round, and reactive to light. Neck:      Thyroid: No thyromegaly. Cardiovascular:      Rate and Rhythm: Normal rate and regular rhythm. Pulmonary:      Effort: Pulmonary effort is normal. No respiratory distress. Breath sounds: Normal breath sounds. No stridor. No wheezing. Abdominal:      General: Bowel sounds are normal.      Palpations: Abdomen is soft. Tenderness: There is abdominal tenderness. Lymphadenopathy:      Cervical: No cervical adenopathy. Skin:     General: Skin is warm and dry. Neurological:      Mental Status: She is alert and oriented to person, place, and time. ASSESSMENT/PLAN:  Shyam Rogers was seen today for head congestion. Diagnoses and all orders for this visit:    Cough/Chills   -     Afebrile. Lungs clear to auscultation. Oxygen saturation 100% on room air.   - Symptoms ranging from a week to a month. Not improving.   - Will treat with an antibiotic. Symptomatic management also reviewed. Patient is agreeable to the plan. - doxycycline hyclate (VIBRA-TABS) 100 MG tablet; Take 1 tablet by mouth 2 times daily for 5 days - patient education handout provided and reviewed with the pt. - Pt will call if symptoms worsen or fail to improve. Dysuria  -     POCT Urinalysis no Micro- negative. - Recommend patient increase fluid water intake and call if symptoms worsen or fail to improve    Return for as previously scheduled or sooner if needed. Pt informed to call if symptoms worsen or fail to improve. All questions answered. Patient states no further questions or concerns at this time.     Electronically signed by JOSE Haque CNP 02/25/20

## 2020-02-25 ENCOUNTER — OFFICE VISIT (OUTPATIENT)
Dept: INTERNAL MEDICINE CLINIC | Age: 60
End: 2020-02-25
Payer: MEDICARE

## 2020-02-25 VITALS
OXYGEN SATURATION: 100 % | WEIGHT: 265 LBS | DIASTOLIC BLOOD PRESSURE: 86 MMHG | HEART RATE: 60 BPM | BODY MASS INDEX: 45.24 KG/M2 | TEMPERATURE: 98.6 F | HEIGHT: 64 IN | SYSTOLIC BLOOD PRESSURE: 138 MMHG

## 2020-02-25 LAB
BILIRUBIN, POC: NORMAL
BLOOD URINE, POC: NORMAL
CLARITY, POC: CLEAR
COLOR, POC: NORMAL
GLUCOSE URINE, POC: NORMAL
KETONES, POC: NORMAL
LEUKOCYTE EST, POC: NORMAL
NITRITE, POC: NORMAL
PH, POC: 7.5
PROTEIN, POC: NORMAL
SPECIFIC GRAVITY, POC: 1.02
UROBILINOGEN, POC: 0.2

## 2020-02-25 PROCEDURE — G8417 CALC BMI ABV UP PARAM F/U: HCPCS | Performed by: NURSE PRACTITIONER

## 2020-02-25 PROCEDURE — G8427 DOCREV CUR MEDS BY ELIG CLIN: HCPCS | Performed by: NURSE PRACTITIONER

## 2020-02-25 PROCEDURE — G8482 FLU IMMUNIZE ORDER/ADMIN: HCPCS | Performed by: NURSE PRACTITIONER

## 2020-02-25 PROCEDURE — 1036F TOBACCO NON-USER: CPT | Performed by: NURSE PRACTITIONER

## 2020-02-25 PROCEDURE — 81002 URINALYSIS NONAUTO W/O SCOPE: CPT | Performed by: NURSE PRACTITIONER

## 2020-02-25 PROCEDURE — 99213 OFFICE O/P EST LOW 20 MIN: CPT | Performed by: NURSE PRACTITIONER

## 2020-02-25 PROCEDURE — 3017F COLORECTAL CA SCREEN DOC REV: CPT | Performed by: NURSE PRACTITIONER

## 2020-02-25 RX ORDER — DOXYCYCLINE HYCLATE 100 MG
100 TABLET ORAL 2 TIMES DAILY
Qty: 10 TABLET | Refills: 0 | Status: SHIPPED | OUTPATIENT
Start: 2020-02-25 | End: 2020-03-01

## 2020-02-25 ASSESSMENT — ENCOUNTER SYMPTOMS
VOMITING: 0
SINUS PRESSURE: 1
DIARRHEA: 1
RHINORRHEA: 1
TROUBLE SWALLOWING: 0
ABDOMINAL PAIN: 0
SHORTNESS OF BREATH: 0
BLOOD IN STOOL: 0
COUGH: 1
CONSTIPATION: 0
SORE THROAT: 0
NAUSEA: 1
WHEEZING: 0
SINUS PAIN: 0
EYE PAIN: 0

## 2020-02-25 NOTE — PATIENT INSTRUCTIONS
Increase fluid water intake  Rest  Use humidifier   Delsym as needed for cough  Tea/honey/lozenge for sore throat  Tylenol as needed for fever or pain  Flonase nasal spray  Call if symptoms worsen or fail to improve        Patient Education   doxycycline (oral/injection)  Pronunciation:  PINA cristina pineda  Brand:  Acticlate, Adoxa, Alodox, Avidoxy, Doryx, Mondoxyne NL, Monodox, Morgidox, Oracea, Oraxyl, Targadox, Vibramycin  What is the most important information I should know about doxycycline? You should not take this medicine if you are allergic to any tetracycline antibiotic. Children younger than 6years old should use doxycycline only in cases of severe or life-threatening conditions. This medicine can cause permanent yellowing or graying of the teeth in children  Using doxycycline during pregnancy could harm the unborn baby or cause permanent tooth discoloration later in the baby's life. What is doxycycline? Doxycycline is a tetracycline antibiotic that fights bacteria in the body. Doxycycline is used to treat many different bacterial infections, such as acne, urinary tract infections, intestinal infections, eye infections, gonorrhea, chlamydia, periodontitis (gum disease), and others. Doxycycline is also used to treat blemishes, bumps, and acne-like lesions caused by rosacea. Doxycycline will not treat facial redness caused by rosacea. Some forms of doxycycline are used to prevent malaria, to treat anthrax, or to treat infections caused by mites, ticks, or lice. Doxycycline may also be used for purposes not listed in this medication guide. What should I discuss with my healthcare provider before taking doxycycline? You should not take this medicine if you are allergic to doxycycline or other tetracycline antibiotics such as demeclocycline, minocycline, tetracycline, or tigecycline.   Tell your doctor if you have ever had:  · liver disease;  · kidney disease;  · asthma or sulfite allergy;  · increased pressure inside your skull; or  · if you also take isotretinoin, seizure medicine, or a blood thinner such as warfarin (Coumadin). If you are using doxycycline to treat gonorrhea, your doctor may test you to make sure you do not also have syphilis, another sexually transmitted disease. Taking this medicine during pregnancy may affect tooth and bone development in the unborn baby. Taking doxycycline during the last half of pregnancy can cause permanent tooth discoloration later in the baby's life. Tell your doctor if you are pregnant or if you become pregnant. Doxycycline can make birth control pills less effective. Ask your doctor about using a non-hormonal birth control (condom, diaphragm with spermicide) to prevent pregnancy. Doxycycline can pass into breast milk and may affect bone and tooth development in a nursing infant. Do not breast-feed while you are taking doxycycline. Doxycycline can cause permanent yellowing or graying of the teeth in children younger than 6years old. Children should use doxycycline only in cases of severe or life-threatening conditions such as anthrax or [facility] spotted fever. The benefit of treating a serious condition may outweigh any risks to the child's tooth development. How should I take doxycycline? Follow all directions on your prescription label and read all medication guides or instruction sheets. Use the medicine exactly as directed. Take doxycycline with a full glass of water. Drink plenty of liquids while you are taking doxycycline. Read and carefully follow any Instructions for Use provided with your medicine. Ask your doctor or pharmacist if you do not understand these instructions. Most brands of doxycyline may be taken with food or milk if the medicine upsets your stomach. Different brands of doxycycline may have different instructions about taking them with or without food.   Take Oracea on an empty stomach, at least 1 hour before or 2 hours after a

## 2020-03-03 ENCOUNTER — PROCEDURE VISIT (OUTPATIENT)
Dept: AUDIOLOGY | Age: 60
End: 2020-03-03
Payer: MEDICARE

## 2020-03-03 PROCEDURE — 92550 TYMPANOMETRY & REFLEX THRESH: CPT | Performed by: AUDIOLOGIST

## 2020-03-03 PROCEDURE — 92557 COMPREHENSIVE HEARING TEST: CPT | Performed by: AUDIOLOGIST

## 2020-03-05 NOTE — PROGRESS NOTES
Preoperative Consultation  Airam López  YOB: 1960    Date of Service:  3/11/2020  Chief Complaint   Patient presents with    Pre-op Exam     4/6 [facility] devika Muñoz- RIGHT Shoulder replacement      This patient presents today at the request of the surgeon for a preoperative consultation. Pt reports she is having right shoulder pain. She was seen by ortho and needs a shoulder replacement. Surgeon: Dr. Arpita Muñoz  Indication for surgery: Shoulder pain  Scheduled procedure: Right shoulder replacement  Date of surgery: 04/06/2020  Location of surgery: [facility]  Indicated/required preoperative testing: EKG  Smoker: No  Previous anesthesia complications: No    Family history of anesthesia complications: No  Loose, capped or false teeth: No  Recent chest pain or SOB: No  Known Bleeding Risk: No recent or remote history of abnormal bleeding  Personal or FH of DVT/PE: No    Patient objection to receiving blood products: No    Allergies   Allergen Reactions    Latex Rash    Statins Nausea Only     Current Outpatient Medications   Medication Sig Dispense Refill    metoprolol succinate (TOPROL XL) 25 MG extended release tablet Take 1 tablet by mouth 2 times daily 180 tablet 0    colestipol (COLESTID) 1 g tablet Take 1 tablet by mouth 2 times daily 180 tablet 0    montelukast (SINGULAIR) 10 MG tablet Take 1 tablet by mouth nightly 90 tablet 0    levothyroxine (SYNTHROID) 112 MCG tablet Take 1 tablet by mouth daily 90 tablet 0    DULoxetine (CYMBALTA) 60 MG extended release capsule Take 1 capsule by mouth daily 90 capsule 0    vitamin D (ERGOCALCIFEROL) 1.25 MG (01782 UT) CAPS capsule Take 1 capsule by mouth once a week 12 capsule 0    gabapentin (NEURONTIN) 300 MG capsule Take 2 capsules by mouth 3 times daily for 90 days.  540 capsule 0    acetaminophen (TYLENOL 8 HOUR) 650 MG extended release tablet Take 650 mg by mouth every 8 hours as needed for Pain      Cetirizine HCl (ALL DAY ALLERGY PO) Take by mouth      Budesonide-Formoterol Fumarate (SYMBICORT IN) Inhale into the lungs      ALBUTEROL SULFATE IN Inhale into the lungs       No current facility-administered medications for this visit. Patient Active Problem List   Diagnosis    Hypertension    Hyperlipidemia    Back pain    Sleep apnea    Urinary incontinence    Morbid obesity with BMI of 40.0-44.9, adult ([facility] [address]    Peripheral polyneuropathy    Neurogenic claudication due to lumbar spinal stenosis    Osteoarthritis of spine with radiculopathy, lumbar region    Balance problem    Skin lesion of left leg    Intractable chronic migraine without aura and with status migrainosus    Prediabetes    Depression    Asthma    Fibromyalgia     Past Medical History:   Diagnosis Date    Anxiety     Asthma     Back pain     Depression     Fibromyalgia     Hyperlipidemia     Hypertension     Migraines     Prediabetes     Unspecified sleep apnea     Urinary incontinence      Past Surgical History:   Procedure Laterality Date    BREAST ENHANCEMENT SURGERY      COLONOSCOPY N/A 11/27/2019    COLONOSCOPY POLYPECTOMY SNARE/COLD BIOPSY performed by Faviola Rodney MD at [address] Left 2017    TONSILLECTOMY  1968    TUBAL LIGATION       Family History   Problem Relation Age of Onset    Cancer Other     Cancer Other     Cancer Maternal Uncle         melanoma    Arthritis Mother     Diabetes Mother     Heart Disease Mother         a-fib    COPD Mother     Cancer Mother         skin    Ovarian Cancer Mother     Stroke Mother     COPD Father     Arthritis Father     Cancer Father         lung, prostate    COPD Brother     Glaucoma Maternal Grandmother     Breast Cancer Maternal Grandmother         breast    Heart Attack Neg Hx      Review of Systems  Review of Systems   Constitutional: Negative for chills, fatigue, fever and unexpected weight change. HENT: Negative for congestion, postnasal drip, sinus pain, sore throat and tinnitus. Respiratory: Negative for cough, shortness of breath and wheezing. Cardiovascular: Negative for chest pain, palpitations and leg swelling. Gastrointestinal: Negative for abdominal pain, blood in stool, constipation, diarrhea, nausea and vomiting. Genitourinary: Negative for dysuria, frequency, hematuria and urgency. Musculoskeletal: Positive for arthralgias (right shoulder pain). Negative for back pain, gait problem and neck pain. Skin: Negative for pallor and rash. Neurological: Negative for dizziness, weakness, light-headedness, numbness and headaches. Psychiatric/Behavioral: Negative for behavioral problems, confusion, dysphoric mood, sleep disturbance and suicidal ideas. The patient is not nervous/anxious. Physical Exam   Vitals:    03/11/20 1108   BP: 124/82   Pulse: 67   SpO2: 98%   Weight: 260 lb (117.9 kg)   Constitutional: She is oriented to person, place, and time. She appears well-developed and well-nourished. No distress. HENT:   Head: Normocephalic and atraumatic. Mouth/Throat: Uvula is midline, oropharynx is clear and moist and mucous membranes are normal.   Eyes: Conjunctivae and EOM are normal.   Neck: Trachea normal and normal range of motion. Neck supple. Cardiovascular: Normal rate, regular rhythm and normal heart sounds. Pulmonary/Chest: Effort normal and breath sounds normal. No respiratory distress. She has no wheezes. She has no rales. Abdominal: Soft, non-tender. Bowel sounds are normal.   Musculoskeletal: She exhibits no edema or redness. Tenderness to the right shoulder joint. Neurological: She is alert and oriented to person, place, and time. Skin: Skin is warm and dry. No rash noted. 2 inch scab noted to the pt's upper right breast. She states that she burnt herself by dropping a curling iron. Has improved. No drainage/discharge noted. Appears well healing.  Wound care reviewed. Pt informed of red flag warning signs and to call with fevers or spreading redness or discharge. Pt agreeable. Psychiatric: She has a normal mood and affect. Her behavior is normal.     EKG Interpretation:  EKG Interpretation:  Stable  Lab Review:  Lab Results   Component Value Date     10/16/2019    K 4.5 10/16/2019     10/16/2019    CO2 26 10/16/2019    BUN 12 10/16/2019    CREATININE 0.7 10/16/2019    GLUCOSE 89 10/16/2019    CALCIUM 9.8 10/16/2019    PROT 6.5 10/16/2019    LABALBU 4.5 10/16/2019    BILITOT 0.3 10/16/2019    ALKPHOS 83 10/16/2019    AST 17 10/16/2019    ALT 19 10/16/2019    LABGLOM >60 10/16/2019    GFRAA >60 10/16/2019    AGRATIO 2.3 (H) 10/16/2019    GLOB 2.0 10/16/2019     Lab Results   Component Value Date    WBC 4.5 10/16/2019    HGB 12.4 10/16/2019    HCT 38.0 10/16/2019    MCV 89.0 10/16/2019     10/16/2019      Assessment:     61 y.o. patient with planned surgery as above. Known risk factors for perioperative complications: Asthma; HTN; TONY- uses CPAP; prediabetes; HLD     Plan:     1. Preoperative workup as follows: EKG  2. Change in medication regimen before surgery: Discontinue NSAIDs 10 days before surgery; stop herbals/minerals 10 days prior to surgery. 3. Deep vein thrombosis prophylaxis: regimen to be chosen by surgical team  4. No contraindications to planned surgery - surgeon to be aware of above risk factors for perioperative complications    All questions answered. Patient states no further questions or concerns at this time.   JOSE Martinez - [facility] 03/11/20  [facility]  [phone]

## 2020-03-11 ENCOUNTER — OFFICE VISIT (OUTPATIENT)
Dept: INTERNAL MEDICINE CLINIC | Age: 60
End: 2020-03-11
Payer: MEDICARE

## 2020-03-11 VITALS
DIASTOLIC BLOOD PRESSURE: 82 MMHG | OXYGEN SATURATION: 98 % | WEIGHT: 260 LBS | BODY MASS INDEX: 44.63 KG/M2 | HEART RATE: 67 BPM | SYSTOLIC BLOOD PRESSURE: 124 MMHG

## 2020-03-11 PROCEDURE — 1036F TOBACCO NON-USER: CPT | Performed by: NURSE PRACTITIONER

## 2020-03-11 PROCEDURE — G8417 CALC BMI ABV UP PARAM F/U: HCPCS | Performed by: NURSE PRACTITIONER

## 2020-03-11 PROCEDURE — G8482 FLU IMMUNIZE ORDER/ADMIN: HCPCS | Performed by: NURSE PRACTITIONER

## 2020-03-11 PROCEDURE — 3017F COLORECTAL CA SCREEN DOC REV: CPT | Performed by: NURSE PRACTITIONER

## 2020-03-11 PROCEDURE — 99214 OFFICE O/P EST MOD 30 MIN: CPT | Performed by: NURSE PRACTITIONER

## 2020-03-11 PROCEDURE — G8427 DOCREV CUR MEDS BY ELIG CLIN: HCPCS | Performed by: NURSE PRACTITIONER

## 2020-03-11 PROCEDURE — 93000 ELECTROCARDIOGRAM COMPLETE: CPT | Performed by: NURSE PRACTITIONER

## 2020-03-11 ASSESSMENT — ENCOUNTER SYMPTOMS
DIARRHEA: 0
WHEEZING: 0
SINUS PAIN: 0
BLOOD IN STOOL: 0
ABDOMINAL PAIN: 0
BACK PAIN: 0
SHORTNESS OF BREATH: 0
VOMITING: 0
SORE THROAT: 0
COUGH: 0
CONSTIPATION: 0
NAUSEA: 0

## 2020-03-12 ENCOUNTER — OFFICE VISIT (OUTPATIENT)
Dept: RHEUMATOLOGY | Age: 60
End: 2020-03-12
Payer: MEDICARE

## 2020-03-12 VITALS
BODY MASS INDEX: 45.21 KG/M2 | WEIGHT: 263.4 LBS | DIASTOLIC BLOOD PRESSURE: 80 MMHG | HEART RATE: 60 BPM | SYSTOLIC BLOOD PRESSURE: 132 MMHG

## 2020-03-12 PROCEDURE — 3017F COLORECTAL CA SCREEN DOC REV: CPT | Performed by: INTERNAL MEDICINE

## 2020-03-12 PROCEDURE — G8427 DOCREV CUR MEDS BY ELIG CLIN: HCPCS | Performed by: INTERNAL MEDICINE

## 2020-03-12 PROCEDURE — 99213 OFFICE O/P EST LOW 20 MIN: CPT | Performed by: INTERNAL MEDICINE

## 2020-03-12 PROCEDURE — G8417 CALC BMI ABV UP PARAM F/U: HCPCS | Performed by: INTERNAL MEDICINE

## 2020-03-12 PROCEDURE — 1036F TOBACCO NON-USER: CPT | Performed by: INTERNAL MEDICINE

## 2020-03-12 PROCEDURE — G8482 FLU IMMUNIZE ORDER/ADMIN: HCPCS | Performed by: INTERNAL MEDICINE

## 2020-03-12 RX ORDER — ERGOCALCIFEROL 1.25 MG/1
50000 CAPSULE ORAL
Qty: 6 CAPSULE | Refills: 1 | Status: SHIPPED | OUTPATIENT
Start: 2020-03-12 | End: 2020-08-05 | Stop reason: SDUPTHER

## 2020-03-12 RX ORDER — DULOXETIN HYDROCHLORIDE 60 MG/1
60 CAPSULE, DELAYED RELEASE ORAL DAILY
Qty: 90 CAPSULE | Refills: 1 | Status: SHIPPED | OUTPATIENT
Start: 2020-03-12 | End: 2020-08-26 | Stop reason: SDUPTHER

## 2020-03-12 NOTE — PROGRESS NOTES
Subjective:      Patient ID: Prince Castellano is a 61 y.o. female. HPI  The patient returns for follow-up of fibromyalgia. She is scheduled for total joint replacement of her right shoulder. She is feeling well on Cymbalta 60 mg a day and she is only using 300 mg of Neurontin twice daily. Review of Systems  Myalgias improved. Sleep improved  Objective:   Physical Exam /80 (Site: Left Upper Arm, Position: Sitting, Cuff Size: Large Adult)   Pulse 60   Wt 263 lb 6.4 oz (119.5 kg)   BMI 45.21 kg/m²   Alert female in no acute distress musculoskeletal exam reveals tender points occiput and trapezius areas no tender points buttock or trochanteric region    Assessment:      Fibromyalgia   Plan:     The patient will cont .  Current  meds f/u 6 months        Madhavi Mabry MD

## 2020-03-23 ENCOUNTER — TELEPHONE (OUTPATIENT)
Dept: INTERNAL MEDICINE CLINIC | Age: 60
End: 2020-03-23

## 2020-03-31 ENCOUNTER — OFFICE VISIT (OUTPATIENT)
Dept: PRIMARY CARE CLINIC | Age: 60
End: 2020-03-31
Payer: MEDICARE

## 2020-03-31 ENCOUNTER — NURSE TRIAGE (OUTPATIENT)
Dept: OTHER | Facility: CLINIC | Age: 60
End: 2020-03-31

## 2020-03-31 VITALS — HEART RATE: 57 BPM | TEMPERATURE: 98.7 F | OXYGEN SATURATION: 98 %

## 2020-03-31 PROCEDURE — 99212 OFFICE O/P EST SF 10 MIN: CPT | Performed by: FAMILY MEDICINE

## 2020-03-31 PROCEDURE — G8482 FLU IMMUNIZE ORDER/ADMIN: HCPCS | Performed by: FAMILY MEDICINE

## 2020-03-31 PROCEDURE — G8417 CALC BMI ABV UP PARAM F/U: HCPCS | Performed by: FAMILY MEDICINE

## 2020-03-31 PROCEDURE — G8428 CUR MEDS NOT DOCUMENT: HCPCS | Performed by: FAMILY MEDICINE

## 2020-03-31 PROCEDURE — 3017F COLORECTAL CA SCREEN DOC REV: CPT | Performed by: FAMILY MEDICINE

## 2020-03-31 PROCEDURE — 1036F TOBACCO NON-USER: CPT | Performed by: FAMILY MEDICINE

## 2020-03-31 RX ORDER — PREDNISONE 20 MG/1
60 TABLET ORAL DAILY
Qty: 15 TABLET | Refills: 0 | Status: SHIPPED | OUTPATIENT
Start: 2020-03-31 | End: 2021-08-23 | Stop reason: SDUPTHER

## 2020-03-31 NOTE — PATIENT INSTRUCTIONS
Preventing the Spread of Coronavirus Disease 2019 in Homes and Residential Communities   For the most recent information go to [email or web address]    Prevention steps for People with confirmed or suspected COVID-19 (including persons under investigation) who do not need to be hospitalized  and   People with confirmed COVID-19 who were hospitalized and determined to be medically stable to go home    Your healthcare provider and public health staff will evaluate whether you can be cared for at home. If it is determined that you do not need to be hospitalized and can be isolated at home, you will be monitored by staff from your local or state health department. You should follow the prevention steps below until a healthcare provider or local or state health department says you can return to your normal activities. Stay home except to get medical care  People who are mildly ill with COVID-19 are able to isolate at home during their illness. You should restrict activities outside your home, except for getting medical care. Do not go to work, school, or public areas. Avoid using public transportation, ride-sharing, or taxis. Separate yourself from other people and animals in your home  People: As much as possible, you should stay in a specific room and away from other people in your home. Also, you should use a separate bathroom, if available. Animals: You should restrict contact with pets and other animals while you are sick with COVID-19, just like you would around other people. Although there have not been reports of pets or other animals becoming sick with COVID-19, it is still recommended that people sick with COVID-19 limit contact with animals until more information is known about the virus. When possible, have another member of your household care for your animals while you are sick.  If you are sick with COVID-19, avoid contact with your pet, including departments. Thank you for enrolling in [address] Please follow the instructions below to securely access your online medical record. [employer] allows you to send messages to your doctor, view your test results, renew your prescriptions, schedule appointments, and more. How Do I Sign Up? 1. In your Internet browser, go to [email or web address]  2. Click on the Sign Up Now link in the Sign In box. You will see the New Member Sign Up page. 3. Enter your [employer] Access Code exactly as it appears below. You will not need to use this code after youve completed the sign-up process. If you do not sign up before the expiration date, you must request a new code. [employer] Access Code: Activation code not generated  Current [employer] Status: Active    4. Enter your Social Security Number ([id number]) and Date of Birth (mm/dd/yyyy) as indicated and click Submit. You will be taken to the next sign-up page. 5. Create a [employer] ID. This will be your [employer] login ID and cannot be changed, so think of one that is secure and easy to remember. 6. Create a [employer] password. You can change your password at any time. 7. Enter your Password Reset Question and Answer. This can be used at a later time if you forget your password. 8. Enter your e-mail address. You will receive e-mail notification when new information is available in [address] 9. Click Sign Up. You can now view your medical record. Additional Information  If you have questions, please contact your physician practice where you receive care. Remember, [employer] is NOT to be used for urgent needs. For medical emergencies, dial 911.

## 2020-03-31 NOTE — TELEPHONE ENCOUNTER
Reason for Disposition   MILD difficulty breathing (e.g., minimal/no SOB at rest, SOB with walking, pulse < 100) of new onset or worse than normal    Answer Assessment - Initial Assessment Questions  1. RESPIRATORY STATUS: \"Describe your breathing? \" (e.g., wheezing, shortness of breath, unable to speak, severe coughing)      Wheezing, sob at rest  2. ONSET: \"When did this breathing problem begin? \"    sob started about a week ago. Had an asthma attack at that time  3. PATTERN \"Does the difficult breathing come and go, or has it been constant since it started? \"      Has been constant  4. SEVERITY: \"How bad is your breathing? \" (e.g., mild, moderate, severe)     - MILD: No SOB at rest, mild SOB with walking, speaks normally in sentences, can lay down, no retractions, pulse < 100.     - MODERATE: SOB at rest, SOB with minimal exertion and prefers to sit, cannot lie down flat, speaks in phrases, mild retractions, audible wheezing, pulse 100-120.     - SEVERE: Very SOB at rest, speaks in single words, struggling to breathe, sitting hunched forward, retractions, pulse > 120       Sob when laying down. 5. RECURRENT SYMPTOM: \"Have you had difficulty breathing before? \" If so, ask: \"When was the last time? \" and \"What happened that time? \"       no  6. CARDIAC HISTORY: \"Do you have any history of heart disease? \" (e.g., heart attack, angina, bypass surgery, angioplasty)      no  7. LUNG HISTORY: \"Do you have any history of lung disease? \"  (e.g., pulmonary embolus, asthma, emphysema)   asthma, using inhaler and nasal spray. 8. CAUSE: \"What do you think is causing the breathing problem? \"    asthma flare  9. OTHER SYMPTOMS: \"Do you have any other symptoms? (e.g., dizziness, runny nose, cough, chest pain, fever)     No fever, no other symptoms. 10. PREGNANCY: \"Is there any chance you are pregnant? \" \"When was your last menstrual period? \"      no  11. TRAVEL: \"Have you traveled out of the country in the last month? \" (e.g., travel history, exposures)        *No Answer*    Protocols used: BREATHING DIFFICULTY-ADULT-[place]  Received call from [facility]. Pt calling with sob for a week. She had an asthma flare last Saturday. No fever. Recommend pt be seen today. Gave location of flu clinic to be seen. Call back as needed, limit activities, continue inhaler as needed. Please do not respond to the triage nurse through this encounter. Any subsequent communication should be directly with the patient.

## 2020-03-31 NOTE — PROGRESS NOTES
3/31/2020  Chelle Love (:  1960)    FLU/COVID-19 CLINIC EVALUATION    HPI: Taking zyrtec daily, using inhaler twice daily with mild relief.   SYMPTOMS:    Symptom duration, days:  [] 1   [] 2   [] 3   [] 4 - 7   [x] 8 - 10   [] 11 - 13   [] >14    [] Fevers    [] Symptom (not measured)  [] Measured (Result:  degrees)  [] Chills  [x] Cough productive white phlegm  [] Coughing up blood  }  [] Chest Congestion  [] Nasal Congestion  [] Sneezing  [x] Feeling short of breath  X 1 week  [] Sometimes  [] Frequently   [] All the time     [] Chest pain     [] Headaches  []Tolerable  [] Severe     [x] Sore throat  [] Muscle aches  [] Nausea  [] Vomiting  []Unable to keep fluids down     [] Diarrhea  []Severe       [] OTHER SYMPTOMS:  Runny nose  Bilateral ear pain      Symptom course:   [] Worsening     [] Stable     [] Improving    RISK FACTORS:1}  [] Pregnant or possibly pregnant  [] Age over 61  [] Diabetes  [x] Heart disease  [x] Asthma  [] COPD/Other chronic lung diseases  [] Active Cancer  [] On Chemotherapy  [] Taking oral steroids  [] History Lymphoma/Leukemia  [] Close contact with a lab confirmed COVID-19 patient within 14 days of symptom onset  [] History of travel from affected geographical areas within 14 days of symptom onset   Seasonal allergies  Irregular [employer]     SOCIAL HISTORY:  Number of people living in patient's home (counting the patient as 1):  [] 1   [x] 2   [] 3   [] 4   [] 5   [] >6      PHYSICAL EXAMINATION:  Vitals:    20 0948   Pulse: 57   Temp: 98.7 °F (37.1 °C)   SpO2: 98%        INSTRUCTIONS:  \"[x]\" Indicates a positive item  \"[]\" Indicates a negative item    DELETE ALL ITEMS NOT EXAMINED    [x] Alert  [x] Oriented to person/place/time    [x] No apparent distress  [] Toxic appearing    [] Face flushed appearing [] Sclera clear  [] Lips are cyanotic      [x] Breathing appears normal  [] Appears tachypneic      [] Rash on visible skin    [] Cranial Nerves II-XII grossly intact [x] Motor grossly intact in visible upper extremities    [] Motor grossly intact in visible lower extremities    [x] Normal Mood  [] Anxious appearing    [] Depressed appearing  [] Confused appearing      [] Poor short term memory  [] Poor long term memory    [] OTHER:  1}    TESTS ORDERED:    [] POCT FLU  [] POCT STREP  [] COVID-19 Test sent    TEST RESULTS:    POCT FLU test:  [] Positive  [] Negative  POCT STREP test:  [] Positive  [] Negative  COVID-19 test:  [] Positive  [] Negative    ASSESSMENT:    [] Flu  [] Strep Throat  [] Uncertain Viral Respiratory Infection  [] Possible COVID-19   Diagnosis Orders   1. Uncomplicated asthma, unspecified asthma severity, unspecified whether persistent         PLAN:    [x] Discharge home with written instructions for:  [] Flu management  [] Strep throat management  [] Viral respiratory illness management  [] Possible COVID-19 infection with self-quarantine and management of symptoms  [x] Follow-up with primary care physician or emergency department if worsens  [] Referred to emergency department for evaluation      An  electronic signature was used to authenticate this note.      --JOSE Vargas NP on 3/31/2020 at 9:49 AM

## 2020-04-03 ENCOUNTER — TELEPHONE (OUTPATIENT)
Dept: INTERNAL MEDICINE CLINIC | Age: 60
End: 2020-04-03

## 2020-04-03 ENCOUNTER — OFFICE VISIT (OUTPATIENT)
Dept: PRIMARY CARE CLINIC | Age: 60
End: 2020-04-03
Payer: MEDICARE

## 2020-04-03 VITALS — TEMPERATURE: 98.6 F | OXYGEN SATURATION: 98 % | HEART RATE: 58 BPM

## 2020-04-03 PROCEDURE — 3017F COLORECTAL CA SCREEN DOC REV: CPT | Performed by: INTERNAL MEDICINE

## 2020-04-03 PROCEDURE — G8417 CALC BMI ABV UP PARAM F/U: HCPCS | Performed by: INTERNAL MEDICINE

## 2020-04-03 PROCEDURE — G8428 CUR MEDS NOT DOCUMENT: HCPCS | Performed by: INTERNAL MEDICINE

## 2020-04-03 PROCEDURE — 1036F TOBACCO NON-USER: CPT | Performed by: INTERNAL MEDICINE

## 2020-04-03 PROCEDURE — 99212 OFFICE O/P EST SF 10 MIN: CPT | Performed by: NURSE PRACTITIONER

## 2020-04-03 RX ORDER — BUDESONIDE AND FORMOTEROL FUMARATE DIHYDRATE 160; 4.5 UG/1; UG/1
2 AEROSOL RESPIRATORY (INHALATION) 2 TIMES DAILY
Qty: 1 INHALER | Refills: 0 | Status: SHIPPED | OUTPATIENT
Start: 2020-04-03 | End: 2020-11-05

## 2020-04-03 RX ORDER — ALBUTEROL SULFATE 90 UG/1
2 AEROSOL, METERED RESPIRATORY (INHALATION) 4 TIMES DAILY
Qty: 1 INHALER | Refills: 3 | Status: SHIPPED | OUTPATIENT
Start: 2020-04-03 | End: 2020-11-05

## 2020-04-03 RX ORDER — AZITHROMYCIN 250 MG/1
250 TABLET, FILM COATED ORAL SEE ADMIN INSTRUCTIONS
Qty: 6 TABLET | Refills: 0 | Status: SHIPPED | OUTPATIENT
Start: 2020-04-03 | End: 2020-04-08

## 2020-04-03 RX ORDER — BUDESONIDE AND FORMOTEROL FUMARATE DIHYDRATE 80; 4.5 UG/1; UG/1
2 AEROSOL RESPIRATORY (INHALATION) DAILY
Qty: 1 INHALER | Refills: 0 | Status: SHIPPED
Start: 2020-04-03 | End: 2020-04-03 | Stop reason: DRUGHIGH

## 2020-04-03 NOTE — PROGRESS NOTES
4/3/2020    FLU/COVID-19 CLINIC EVALUATION    HPI Pt was seen on Monday at Flu clinic and treated with prednisone taper. SYMPTOMS:    Symptom duration, days:  [] 1   [] 2   [] 3   [] 4   [] 5   [] 6   [x] 7   [] 8   [] 9   [] 10   [] 11   [] 12   [] 13 [] 14 +    [x] Alert   []Oriented to person/place/time    []No apparent distress     []Toxic appearing    [x]Breathing appears normal     []Appears tachypneic         [x]Speaking in full sentences     Symptom course:   [x] Worsening     [] Stable     [] Improving    [] Fevers  [] Symptom (not measured)  [] Measured (Result: )  [] Chills  [x] Cough  [] Coughing up blood  []Productive  []Dry  [] Chest Congestion  []Chest Tightness  [] Nasal Congestion  []Runny Nose  [x] Feeling short of breath  []Fatigue  [] Chest pain  [] Headaches  []Tolerable  [] Severe  [] Sore throat  [] Muscle aches  [] Nausea  [] Decreased appetite  [] Vomiting  []Unable to keep fluids down  [] Diarrhea  []Severe    [] OTHER SYMPTOMS:      RISK FACTORS:    [] Pregnant or possibly pregnant  [] Age over 61  [] Diabetes  [] Heart disease  [x] Asthma  [] COPD/Other chronic lung diseases  [] Active Cancer  [] On Chemotherapy  [] Taking oral steroids  [] History Lymphoma/Leukemia  [] Close contact with a lab confirmed COVID-19 patient within 14 days of symptom onset  [] History of travel from affected geographical areas within 14 days of symptom onset  [] Health Care Worker Exposure no symptoms  [] Health Care Worker Exposure symptomatic      VITALS:  Vitals:    04/03/20 1534   Pulse: 58   Temp: 98.6 °F (37 °C)   SpO2: 98%      PHYSICAL EXAMINATION:        [x]Alert   [x]Oriented to person/place/time    []No apparent distress     []Toxic appearing    [x]Breathing appears normal     []Appears tachypneic         [x]Speaking in full sentences     TESTS:    POCT FLU:  [] Positive     []Negative  POCT STREP:  [] Positive     []Negative    [] COVID-19 Test sent:      ASSESSMENT:   Diagnosis Orders   1. Exacerbation of asthma, unspecified asthma severity, unspecified whether persistent         [] Flu  [] Strep Throat  [] Uncertain Viral Respiratory Illness  [] Possible COVID-19  [] Exposure COVID-19  [] Other    PLAN:    [x] Discharge home with written instructions for:  [] Flu management  [] Strep throat management  [] Possible COVID-19 exposure with self-quarantine   [] Possible COVID-19 with isolation instructions and management of symptoms  [x] Follow-up with primary care physician or emergency department if worsens  [] Referred to emergency department for evaluation    [x] Evaluation per physician/nurse practitioner in clinic      An  electronic signature was used to authenticate this note.      --JOSE Pena NP on 4/3/2020 at 3:37 PM

## 2020-04-03 NOTE — TELEPHONE ENCOUNTER
Patient went through drive through clinic and was prescribed prednisone. Patient has been taking it , but has not gotten any better. Patient is having troubles breathing and is diagnosed with asthma. Please advise.

## 2020-04-03 NOTE — PATIENT INSTRUCTIONS

## 2020-04-13 ENCOUNTER — TELEPHONE (OUTPATIENT)
Dept: INTERNAL MEDICINE CLINIC | Age: 60
End: 2020-04-13

## 2020-04-14 ENCOUNTER — TELEMEDICINE (OUTPATIENT)
Dept: INTERNAL MEDICINE CLINIC | Age: 60
End: 2020-04-14
Payer: MEDICARE

## 2020-04-14 PROCEDURE — 99213 OFFICE O/P EST LOW 20 MIN: CPT | Performed by: NURSE PRACTITIONER

## 2020-04-14 PROCEDURE — 1036F TOBACCO NON-USER: CPT | Performed by: NURSE PRACTITIONER

## 2020-04-14 PROCEDURE — G8417 CALC BMI ABV UP PARAM F/U: HCPCS | Performed by: NURSE PRACTITIONER

## 2020-04-14 PROCEDURE — G8428 CUR MEDS NOT DOCUMENT: HCPCS | Performed by: NURSE PRACTITIONER

## 2020-04-14 PROCEDURE — 3017F COLORECTAL CA SCREEN DOC REV: CPT | Performed by: NURSE PRACTITIONER

## 2020-04-14 RX ORDER — BENZONATATE 100 MG/1
100 CAPSULE ORAL 3 TIMES DAILY PRN
Qty: 30 CAPSULE | Refills: 0 | Status: SHIPPED | OUTPATIENT
Start: 2020-04-14 | End: 2020-04-21

## 2020-04-14 ASSESSMENT — ENCOUNTER SYMPTOMS
SORE THROAT: 0
DIARRHEA: 0
SINUS PRESSURE: 0
SHORTNESS OF BREATH: 0
TROUBLE SWALLOWING: 0
SINUS PAIN: 0
COUGH: 1
RHINORRHEA: 0
ABDOMINAL PAIN: 0
CONSTIPATION: 0
NAUSEA: 0
WHEEZING: 0
VOMITING: 0

## 2020-04-14 NOTE — PATIENT INSTRUCTIONS
Benzonatate for cough- three times daily as needed  Increase fluid water intake. Rest  Use a humidifier      Patient Education   benzonatate  Pronunciation:  lizbet dunn  Brand:  Tessalon Perles  What is the most important information I should know about benzonatate? Never suck or chew on a benzonatate capsule. Swallow the pill whole. Sucking or chewing the capsule may cause serious side effects. Benzonatate is not approved for use by anyone younger than 8years old. An overdose of benzonatate can be fatal to a young child. What is benzonatate? Benzonatate is used to relieve coughing. Benzonatate is a non-narcotic cough medicine that numbs the throat and lungs, making the cough reflex less active. Benzonatate may also be used for purposes not listed in this medication guide. What should I discuss with my healthcare provider before taking benzonatate? You should not use this medicine if you are allergic to benzonatate or topical numbing medicines such as tetracaine or procaine (found in some insect bite and sunburn creams). Tell your doctor if you are pregnant or breastfeeding. Benzonatate is not approved for use by anyone younger than 8years old. An overdose of benzonatate can be fatal, especially to a young child who has accidentally swallowed the medicine. How should I take benzonatate? Follow all directions on your prescription label and read all medication guides or instruction sheets. Use the medicine exactly as directed. Never suck or chew on a benzonatate capsule. Swallow the pill whole. Sucking or chewing the capsule may cause serious side effects. Store at room temperature away from moisture, heat, and light. What happens if I miss a dose? Skip the missed dose and use your next dose at the regular time. Do not use two doses at one time. What happens if I overdose? Seek emergency medical attention or call the Poison Help line at 1-554.218.4366.  An overdose of benzonatate can be

## 2020-04-14 NOTE — PROGRESS NOTES
tablet Take 1 tablet by mouth 2 times daily 180 tablet 0    montelukast (SINGULAIR) 10 MG tablet Take 1 tablet by mouth nightly 90 tablet 0    levothyroxine (SYNTHROID) 112 MCG tablet Take 1 tablet by mouth daily 90 tablet 0    gabapentin (NEURONTIN) 300 MG capsule Take 2 capsules by mouth 3 times daily for 90 days. 540 capsule 0    acetaminophen (TYLENOL 8 HOUR) 650 MG extended release tablet Take 650 mg by mouth every 8 hours as needed for Pain       No current facility-administered medications for this visit. Review of Systems   Constitutional: Negative for appetite change, chills, diaphoresis, fatigue and fever. HENT: Negative for congestion, ear pain, hearing loss, postnasal drip, rhinorrhea, sinus pressure, sinus pain, sneezing, sore throat and trouble swallowing. Respiratory: Positive for cough. Negative for shortness of breath and wheezing. Cardiovascular: Negative for chest pain and palpitations. Gastrointestinal: Negative for abdominal pain, constipation, diarrhea, nausea and vomiting. Skin: Negative for pallor. OBJECTIVE:  Video Virtual Visit  States she is unable to locate home BP cuff. No access to thermometer per pt. Physical Exam  Constitutional:       General: She is not in acute distress. Appearance: Normal appearance. She is not ill-appearing. Pulmonary:      Effort: Pulmonary effort is normal. No respiratory distress. Skin:     Coloration: Skin is not jaundiced or pale. Neurological:      Mental Status: She is alert. Comments: No facial asymmetry noted   Psychiatric:         Mood and Affect: Mood normal.     Due to this being a TeleHealth encounter, evaluation of the following organ systems is limited: Vitals/Constitutional/EENT/Resp/CV/GI//MS/Neuro/Skin/Heme-Lymph-Imm. ASSESSMENT/PLAN:  Joby Navarrete was seen today for cough. Diagnoses and all orders for this visit:    Cough  -    Speaking in full sentences. Laughing throughout visit. Not ill appearing. She took all the antibiotic as prescribed and reports that she is feeling better. Lingering cough continues, but is improved from prior.  - Reviewed CXR/prednisone- pt declines. - Offered benzonatate for cough. Pt agreeable. Symptomatic management also reviewed. - Continue inhalers as prescribed. - benzonatate (TESSALON) 100 MG capsule; Take 1 capsule by mouth 3 times daily as needed for Cough - patient education handout provided and reviewed with the pt. - Pt will call if symptoms worsen or fail to improve  - Red flag warning signs reviewed with the pt and she will call if these occur. Return for as previously scheduled or sooner if needed. Pursuant to the emergency declaration under the [facility], 1135 waiver authority and the [employer] Act, this Virtual  Visit was conducted, with patient's consent, to reduce the patient's risk of exposure to COVID-19 and provide continuity of care for an established patient. Services were provided through a video synchronous discussion virtually to substitute for in-person clinic visit. Pt informed to call if symptoms worsen or fail to improve. All questions answered. Patient states no further questions or concerns at this time.     Electronically signed by JOSE Pappas CNP 04/14/20

## 2020-05-06 NOTE — PROGRESS NOTES
apnea   - Continue with CPAP. Prediabetes   - Lifestyle modifications such as exercise, weight loss and healthy diet encouraged and reviewed with the pt. - A1C next visit. Other depression   - Well controlled. PHQ9 is 0. Denies SI/HI. Vitamin D deficiency   - Continue supplements at this time. - Labs next visit. I affirm this is a Patient Initiated Episode with a Patient who has not had a related appointment within my department in the past 7 days or scheduled within the next 24 hours. Patient identification was verified at the start of the visit: Yes    Total Time: minutes: 11-20 minutes    Note: not billable if this call serves to triage the patient into an appointment for the relevant concern    Return for 1-2 weeks BP f/u and 3 month HTN/TSH/mood/HLD/labs f/u, or sooner if needed.   Electronically signed by: JOSE Daniel CNP 05/12/20

## 2020-05-12 ENCOUNTER — VIRTUAL VISIT (OUTPATIENT)
Dept: INTERNAL MEDICINE CLINIC | Age: 60
End: 2020-05-12
Payer: MEDICARE

## 2020-05-12 VITALS
SYSTOLIC BLOOD PRESSURE: 172 MMHG | BODY MASS INDEX: 46.52 KG/M2 | WEIGHT: 271 LBS | DIASTOLIC BLOOD PRESSURE: 104 MMHG | HEART RATE: 63 BPM

## 2020-05-12 PROCEDURE — 99442 PR PHYS/QHP TELEPHONE EVALUATION 11-20 MIN: CPT | Performed by: NURSE PRACTITIONER

## 2020-05-12 RX ORDER — MONTELUKAST SODIUM 10 MG/1
10 TABLET ORAL NIGHTLY
Qty: 90 TABLET | Refills: 0 | Status: SHIPPED | OUTPATIENT
Start: 2020-05-12 | End: 2020-08-11 | Stop reason: SDUPTHER

## 2020-05-12 RX ORDER — LEVOTHYROXINE SODIUM 112 UG/1
112 TABLET ORAL DAILY
Qty: 90 TABLET | Refills: 0 | Status: SHIPPED | OUTPATIENT
Start: 2020-05-12 | End: 2020-08-05 | Stop reason: SDUPTHER

## 2020-05-12 RX ORDER — METOPROLOL SUCCINATE 25 MG/1
25 TABLET, EXTENDED RELEASE ORAL 2 TIMES DAILY
Qty: 180 TABLET | Refills: 0 | Status: SHIPPED | OUTPATIENT
Start: 2020-05-12 | End: 2020-08-11 | Stop reason: SDUPTHER

## 2020-05-12 RX ORDER — MONTELUKAST SODIUM 4 MG/1
1 TABLET, CHEWABLE ORAL 2 TIMES DAILY
Qty: 180 TABLET | Refills: 0 | Status: SHIPPED | OUTPATIENT
Start: 2020-05-12 | End: 2020-08-05 | Stop reason: SDUPTHER

## 2020-05-12 ASSESSMENT — PATIENT HEALTH QUESTIONNAIRE - PHQ9
1. LITTLE INTEREST OR PLEASURE IN DOING THINGS: 0
SUM OF ALL RESPONSES TO PHQ QUESTIONS 1-9: 0
2. FEELING DOWN, DEPRESSED OR HOPELESS: 0
SUM OF ALL RESPONSES TO PHQ9 QUESTIONS 1 & 2: 0
SUM OF ALL RESPONSES TO PHQ QUESTIONS 1-9: 0

## 2020-05-29 ENCOUNTER — TELEPHONE (OUTPATIENT)
Dept: INTERNAL MEDICINE CLINIC | Age: 60
End: 2020-05-29

## 2020-05-29 ENCOUNTER — TELEPHONE (OUTPATIENT)
Dept: ADMINISTRATIVE | Age: 60
End: 2020-05-29

## 2020-05-29 ENCOUNTER — NURSE TRIAGE (OUTPATIENT)
Dept: OTHER | Facility: CLINIC | Age: 60
End: 2020-05-29

## 2020-05-29 NOTE — TELEPHONE ENCOUNTER
Patient called today with sob, rapid heart rate 140, htn  Was offered an appointment for virtual visit & flu clinin , refused appointment. Patient would like JOSE Shi CNP to tele health appointment. Nurse triage stated the patient needed to be seen today. Please advise patient.         Patient was made aware of e-visits via [place]

## 2020-05-29 NOTE — TELEPHONE ENCOUNTER
KLAUDIA. Advised by VM that the phone visit would likely lead to visit with flu/red clinic with her symptoms- per Finesse Avalos.

## 2020-06-02 ENCOUNTER — OFFICE VISIT (OUTPATIENT)
Dept: INTERNAL MEDICINE CLINIC | Age: 60
End: 2020-06-02
Payer: MEDICARE

## 2020-06-02 ENCOUNTER — TELEPHONE (OUTPATIENT)
Dept: INTERNAL MEDICINE CLINIC | Age: 60
End: 2020-06-02

## 2020-06-02 VITALS
WEIGHT: 271 LBS | OXYGEN SATURATION: 96 % | DIASTOLIC BLOOD PRESSURE: 76 MMHG | BODY MASS INDEX: 46.26 KG/M2 | TEMPERATURE: 98 F | HEART RATE: 72 BPM | HEIGHT: 64 IN | SYSTOLIC BLOOD PRESSURE: 122 MMHG

## 2020-06-02 DIAGNOSIS — R94.31 ABNORMAL EKG: ICD-10-CM

## 2020-06-02 DIAGNOSIS — E03.8 OTHER SPECIFIED HYPOTHYROIDISM: ICD-10-CM

## 2020-06-02 LAB
A/G RATIO: 1.8 (ref 1.1–2.2)
ALBUMIN SERPL-MCNC: 4.1 G/DL (ref 3.4–5)
ALP BLD-CCNC: 90 U/L (ref 40–129)
ALT SERPL-CCNC: <5 U/L (ref 10–40)
ANION GAP SERPL CALCULATED.3IONS-SCNC: 13 MMOL/L (ref 3–16)
AST SERPL-CCNC: <5 U/L (ref 15–37)
BASOPHILS ABSOLUTE: 0.1 K/UL (ref 0–0.2)
BASOPHILS RELATIVE PERCENT: 1.9 %
BILIRUB SERPL-MCNC: <0.2 MG/DL (ref 0–1)
BUN BLDV-MCNC: 15 MG/DL (ref 7–20)
CALCIUM SERPL-MCNC: 10.1 MG/DL (ref 8.3–10.6)
CHLORIDE BLD-SCNC: 104 MMOL/L (ref 99–110)
CO2: 24 MMOL/L (ref 21–32)
CREAT SERPL-MCNC: 0.8 MG/DL (ref 0.6–1.1)
EOSINOPHILS ABSOLUTE: 0.1 K/UL (ref 0–0.6)
EOSINOPHILS RELATIVE PERCENT: 1.6 %
GFR AFRICAN AMERICAN: >60
GFR NON-AFRICAN AMERICAN: >60
GLOBULIN: 2.3 G/DL
GLUCOSE BLD-MCNC: 128 MG/DL (ref 70–99)
HCT VFR BLD CALC: 39.6 % (ref 36–48)
HEMOGLOBIN: 13.3 G/DL (ref 12–16)
LYMPHOCYTES ABSOLUTE: 1.6 K/UL (ref 1–5.1)
LYMPHOCYTES RELATIVE PERCENT: 27.6 %
MCH RBC QN AUTO: 30.1 PG (ref 26–34)
MCHC RBC AUTO-ENTMCNC: 33.7 G/DL (ref 31–36)
MCV RBC AUTO: 89.3 FL (ref 80–100)
MONOCYTES ABSOLUTE: 0.4 K/UL (ref 0–1.3)
MONOCYTES RELATIVE PERCENT: 6.6 %
NEUTROPHILS ABSOLUTE: 3.7 K/UL (ref 1.7–7.7)
NEUTROPHILS RELATIVE PERCENT: 62.3 %
PDW BLD-RTO: 14.8 % (ref 12.4–15.4)
PLATELET # BLD: 216 K/UL (ref 135–450)
PMV BLD AUTO: 9 FL (ref 5–10.5)
POTASSIUM SERPL-SCNC: 4.6 MMOL/L (ref 3.5–5.1)
RBC # BLD: 4.44 M/UL (ref 4–5.2)
SODIUM BLD-SCNC: 141 MMOL/L (ref 136–145)
TOTAL PROTEIN: 6.4 G/DL (ref 6.4–8.2)
TSH SERPL DL<=0.05 MIU/L-ACNC: 0.35 UIU/ML (ref 0.27–4.2)
WBC # BLD: 5.9 K/UL (ref 4–11)

## 2020-06-02 PROCEDURE — G8427 DOCREV CUR MEDS BY ELIG CLIN: HCPCS | Performed by: NURSE PRACTITIONER

## 2020-06-02 PROCEDURE — G8417 CALC BMI ABV UP PARAM F/U: HCPCS | Performed by: NURSE PRACTITIONER

## 2020-06-02 PROCEDURE — 3017F COLORECTAL CA SCREEN DOC REV: CPT | Performed by: NURSE PRACTITIONER

## 2020-06-02 PROCEDURE — 1111F DSCHRG MED/CURRENT MED MERGE: CPT | Performed by: NURSE PRACTITIONER

## 2020-06-02 PROCEDURE — 99214 OFFICE O/P EST MOD 30 MIN: CPT | Performed by: NURSE PRACTITIONER

## 2020-06-02 PROCEDURE — 1036F TOBACCO NON-USER: CPT | Performed by: NURSE PRACTITIONER

## 2020-06-02 ASSESSMENT — ENCOUNTER SYMPTOMS
ABDOMINAL PAIN: 0
WHEEZING: 0
CONSTIPATION: 0
SHORTNESS OF BREATH: 0
VOMITING: 0
COUGH: 0
NAUSEA: 0
DIARRHEA: 0

## 2020-06-02 NOTE — PROGRESS NOTES
Acute Office Visit  6/2/2020    SUBJECTIVE:    Patient ID: Joseph Garcia is a 61 y.o. female. Chief Complaint   Patient presents with    Follow-up     Pt recently seen in urgent care for episodes of dizziness over the past 2 weeks. HPI: The patient presents to the office for an acute visit. Pt reports that she noticed her BP was high intermittently every few days. She reports that she noticed SOB/nausea/dizziness over the weekend. She went to urgent care and states they did an EKG and sent her home. Symptoms intermittent. No symptoms at this time. HTN- Takes metoprolol 25 mg BID for heart palpitations. Denies palpitations. No chest pain, shortness of breath, trouble breathing, lightheadedness, dizziness or blurred vision today.     Allergies   Allergen Reactions    Latex Rash    Statins Nausea Only     Current Outpatient Medications   Medication Sig Dispense Refill    rivaroxaban (XARELTO) 20 MG TABS tablet Take 1 tablet by mouth Daily with supper 30 tablet 1    metoprolol succinate (TOPROL XL) 25 MG extended release tablet Take 1 tablet by mouth 2 times daily 180 tablet 0    levothyroxine (SYNTHROID) 112 MCG tablet Take 1 tablet by mouth daily 90 tablet 0    colestipol (COLESTID) 1 g tablet Take 1 tablet by mouth 2 times daily 180 tablet 0    montelukast (SINGULAIR) 10 MG tablet Take 1 tablet by mouth nightly 90 tablet 0    budesonide-formoterol (SYMBICORT) 160-4.5 MCG/ACT AERO Inhale 2 puffs into the lungs 2 times daily 1 Inhaler 0    DULoxetine (CYMBALTA) 60 MG extended release capsule Take 1 capsule by mouth daily 90 capsule 1    vitamin D (ERGOCALCIFEROL) 1.25 MG (63205 UT) CAPS capsule Take 1 capsule by mouth every 14 days 6 capsule 1    acetaminophen (TYLENOL 8 HOUR) 650 MG extended release tablet Take 650 mg by mouth every 8 hours as needed for Pain      albuterol sulfate  (90 Base) MCG/ACT inhaler Inhale 2 puffs into the lungs 4 times daily 1 Inhaler 3    gabapentin (NEURONTIN) 300 MG capsule Take 2 capsules by mouth 3 times daily for 90 days. 540 capsule 0     No current facility-administered medications for this visit. Review of Systems   Constitutional: Negative for chills, fatigue, fever and unexpected weight change. Eyes: Negative for visual disturbance. Respiratory: Negative for cough, shortness of breath and wheezing. Cardiovascular: Negative for chest pain, palpitations and leg swelling. Gastrointestinal: Negative for abdominal pain, constipation, diarrhea, nausea and vomiting. Skin: Negative for pallor and rash. Neurological: Negative for dizziness, tremors, seizures, syncope, facial asymmetry, speech difficulty, weakness, light-headedness, numbness and headaches. [id number] Score for Atrial Fibrillation Stroke Risk   Risk   Factors  Component Value   C CHF No 0   H HTN Yes 1   A2 Age >= 76 No,  (64 y.o.) 0   D DM No 0   S2 Prior Stroke/TIA No 0   V Vascular Disease No 0   A Age 74-69 No,  (64 y.o.) 0   Sc Sex female 1    [id number]  Score  2   Score last updated 8/0/20 3:79 PM EDT  Click here for a link to the UpToDate guideline \"Atrial Fibrillation: Anticoagulation therapy to prevent embolization  Disclaimer: Risk Score calculation is dependent on accuracy of patient problem list and past encounter diagnosis. OBJECTIVE:  /76 (Site: Left Upper Arm, Position: Sitting, Cuff Size: Large Adult)   Pulse 72   Temp 98 °F (36.7 °C) (Oral)   Ht 5' 4\" (1.626 m)   Wt 271 lb (122.9 kg)   SpO2 96%   BMI 46.52 kg/m²    Physical Exam  Vitals signs reviewed. Constitutional:       General: She is not in acute distress. Appearance: She is well-developed. She is not diaphoretic. HENT:      Head: Normocephalic and atraumatic. Eyes:      Pupils: Pupils are equal, round, and reactive to light. Cardiovascular:      Rate and Rhythm: Normal rate and regular rhythm.    Pulmonary:      Effort: Pulmonary effort is normal. No respiratory distress. Breath sounds: Normal breath sounds. No wheezing or rales. Chest:      Chest wall: No tenderness. Abdominal:      General: Bowel sounds are normal.      Palpations: Abdomen is soft. Skin:     General: Skin is warm and dry. Coloration: Skin is not pale. Findings: No erythema or rash. Neurological:      Mental Status: She is alert and oriented to person, place, and time. Coordination: Coordination normal.   Psychiatric:         Mood and Affect: Mood normal.        ASSESSMENT/PLAN:  Daniel Donnelly was seen today for follow-up. Diagnoses and all orders for this visit:    History of dizziness/History of shortness of breath/Abnormal EKG/Other specified hypothyroidism  -    Reviewed EKG that the patient brought from urgent care. Appears to be a-fib with RVR.   - Normal rate and rhythm with auscultation today. Continue current dose of metoprolol.  - EKG 12 lead- stable  - Will evaluate labs  -     CBC Auto Differential; Future  -     COMPREHENSIVE METABOLIC PANEL; Future  -     TSH without Reflex; Future  - [id number] score reviewed. Recommended the patient start on anticoagulation. Risks versus benefits were extensively reviewed with the patient today. Patient is agreeable to starting anticoagulants. Multiple options were reviewed with the patient. She is agreeable to Xarelto at this time. Side effects reviewed. -     rivaroxaban (XARELTO) 20 MG TABS tablet; Take 1 tablet by mouth Daily with supper - patient education handout provided and reviewed with the pt. - I recommended the patient have a cardiac Holter monitor as well as an echo. Patient expressed interest in a 30-day Holter monitor. Reviewed referral to electrophysiology for this test.  Patient is agreeable to plan. She is not interested in further testing until she sees cardiology.    -     Néstor Telles MD, Cardiac Electrophysiology, [facility]  -     GA DISCHARGE MEDS RECONCILED W/ CURRENT OUTPATIENT MED

## 2020-06-02 NOTE — TELEPHONE ENCOUNTER
[facility] received a call from:    Name of Caller: Rehana Ross contact number: [phone]    Reason for call: Patient went to urgent care over the weekend and was told she would get a call Monday from her pcp in order to discuss her ekg readings. Patient was told by urgent care doctor that her ekg readings were indicating Afib for patient.  Please advise

## 2020-06-02 NOTE — PATIENT INSTRUCTIONS
will depend on the reason you are using this medication. For some conditions, rivaroxaban should be taken with food. Whether you take the medicine with or without food may also depend on the tablet strength you take. Follow your doctor's dosing instructions very carefully. Tell your doctor if you have trouble swallowing a rivaroxaban tablet. Tell any doctor who treats you that you are using rivaroxaban. If you need surgery or dental work, tell the surgeon or dentist ahead of time that you are using this medication. If you need anesthesia for a medical procedure or surgery, you may need to stop using rivaroxaban for a short time. Do not change your dose or stop taking this medication without first talking to your doctor. Stopping suddenly can increase your risk of blood clot or stroke. Store at room temperature away from moisture and heat. What happens if I miss a dose? If you take rivaroxaban 1 time each day: Take the medicine as soon as you remember, and then go back to your regular schedule. Do not take two doses in the same day. If you take the 2.5-milligram tablet 2 times each day: Skip the missed dose and take your next dose at the regular time. If you take the 15-milligram tablet 2 times each day: Take the missed dose on the same day you remember it. You may take 2 doses at the same time to make up a missed dose. Get your prescription refilled before you run out of medicine completely. What happens if I overdose? Seek emergency medical attention or call the Poison Help line at 1-203.833.6557. Overdose may cause excessive bleeding. What should I avoid while taking rivaroxaban? Avoid activities that may increase your risk of bleeding or injury. Use extra care to prevent bleeding while shaving or brushing your teeth. What are the possible side effects of rivaroxaban?   Get emergency medical help if you have signs of an allergic reaction: hives; difficult breathing; swelling of your face, lips,

## 2020-06-03 ENCOUNTER — TELEPHONE (OUTPATIENT)
Dept: INTERNAL MEDICINE CLINIC | Age: 60
End: 2020-06-03

## 2020-07-06 PROCEDURE — 93228 REMOTE 30 DAY ECG REV/REPORT: CPT | Performed by: INTERNAL MEDICINE

## 2020-07-07 ENCOUNTER — OFFICE VISIT (OUTPATIENT)
Dept: INTERNAL MEDICINE CLINIC | Age: 60
End: 2020-07-07
Payer: MEDICARE

## 2020-07-07 VITALS
HEIGHT: 64 IN | BODY MASS INDEX: 45.21 KG/M2 | HEART RATE: 86 BPM | OXYGEN SATURATION: 97 % | SYSTOLIC BLOOD PRESSURE: 130 MMHG | DIASTOLIC BLOOD PRESSURE: 82 MMHG | TEMPERATURE: 98.4 F | WEIGHT: 264.8 LBS

## 2020-07-07 PROCEDURE — G8417 CALC BMI ABV UP PARAM F/U: HCPCS | Performed by: NURSE PRACTITIONER

## 2020-07-07 PROCEDURE — 99213 OFFICE O/P EST LOW 20 MIN: CPT | Performed by: NURSE PRACTITIONER

## 2020-07-07 PROCEDURE — G8427 DOCREV CUR MEDS BY ELIG CLIN: HCPCS | Performed by: NURSE PRACTITIONER

## 2020-07-07 PROCEDURE — 3017F COLORECTAL CA SCREEN DOC REV: CPT | Performed by: NURSE PRACTITIONER

## 2020-07-07 PROCEDURE — 1036F TOBACCO NON-USER: CPT | Performed by: NURSE PRACTITIONER

## 2020-07-07 ASSESSMENT — ENCOUNTER SYMPTOMS
VOMITING: 0
NAUSEA: 0
CONSTIPATION: 0
DIARRHEA: 0
COUGH: 0
WHEEZING: 0
ABDOMINAL PAIN: 0
SHORTNESS OF BREATH: 0

## 2020-07-07 NOTE — PROGRESS NOTES
Office Visit   7/7/2020    Subjective:  Chief Complaint   Patient presents with    1 Month Follow-Up     HPI:   Homa Galloway is a 61 y.o. female who presents to the clinic today for follow up. HTN- Patient was previously seen due to an abnormal EKG reported from urgent care. Her EKG was stable at our office without atrial fibrillation noted. Due to her chadsvasc score, she was started on Xarelto. An echo and holter monitor was recommended. States she had a cardiac holter monitor for 30 days- states she sent this in yesterday. Strongly recommended electrophysiology referral-patient has appointment next week. Was taking xarelto but stopped this over the weekend since her script ran out. She states she was not aware of the refill. Takes metoprolol 25 mg BID for heart palpitations. Had intermittent dizziness still. States that she has fasted for 24 hours today and she feels dizzy from not eating. Denies palpitations. No chest pain, shortness of breath, trouble breathing, lightheadedness or blurred vision today. Review of Systems   Constitutional: Negative for chills, fatigue, fever and unexpected weight change. Eyes: Negative for visual disturbance. Respiratory: Negative for cough, shortness of breath and wheezing. Cardiovascular: Negative for chest pain, palpitations and leg swelling. Gastrointestinal: Negative for abdominal pain, constipation, diarrhea, nausea and vomiting. Skin: Negative for pallor and rash. Neurological: Positive for dizziness (history of). Negative for tremors, seizures, syncope, speech difficulty, weakness, numbness and headaches.      Allergies   Allergen Reactions    Latex Rash    Statins Nausea Only     Current Outpatient Rx   Medication Sig Dispense Refill    rivaroxaban (XARELTO) 20 MG TABS tablet Take 1 tablet by mouth Daily with supper 30 tablet 1    metoprolol succinate (TOPROL XL) 25 MG extended release tablet Take 1 tablet by mouth 2 times daily 180 tablet 0    levothyroxine (SYNTHROID) 112 MCG tablet Take 1 tablet by mouth daily 90 tablet 0    colestipol (COLESTID) 1 g tablet Take 1 tablet by mouth 2 times daily 180 tablet 0    montelukast (SINGULAIR) 10 MG tablet Take 1 tablet by mouth nightly 90 tablet 0    budesonide-formoterol (SYMBICORT) 160-4.5 MCG/ACT AERO Inhale 2 puffs into the lungs 2 times daily 1 Inhaler 0    DULoxetine (CYMBALTA) 60 MG extended release capsule Take 1 capsule by mouth daily 90 capsule 1    vitamin D (ERGOCALCIFEROL) 1.25 MG (85536 UT) CAPS capsule Take 1 capsule by mouth every 14 days 6 capsule 1    acetaminophen (TYLENOL 8 HOUR) 650 MG extended release tablet Take 650 mg by mouth every 8 hours as needed for Pain      albuterol sulfate  (90 Base) MCG/ACT inhaler Inhale 2 puffs into the lungs 4 times daily 1 Inhaler 3    gabapentin (NEURONTIN) 300 MG capsule Take 2 capsules by mouth 3 times daily for 90 days.  540 capsule 0     Patient Active Problem List   Diagnosis    Hypertension    Hyperlipidemia    Back pain    Sleep apnea    Urinary incontinence    Morbid obesity with BMI of 40.0-44.9, adult (HCC)    Peripheral polyneuropathy    Neurogenic claudication due to lumbar spinal stenosis    Osteoarthritis of spine with radiculopathy, lumbar region    Balance problem    Skin lesion of left leg    Intractable chronic migraine without aura and with status migrainosus    Prediabetes    Depression    Asthma    Fibromyalgia      Wt Readings from Last 3 Encounters:   07/07/20 264 lb 12.8 oz (120.1 kg)   06/02/20 271 lb (122.9 kg)   05/12/20 271 lb (122.9 kg)     BP Readings from Last 3 Encounters:   07/07/20 130/82   06/02/20 122/76   05/12/20 (!) 172/104     The 10-year ASCVD risk score (Kasie Marion, et al., 2013) is: 2.9%    Values used to calculate the score:      Age: 61 years      Sex: Female      Is Non- : No      Diabetic: No      Tobacco smoker: No      Systolic Blood Pressure: follow-up. Diagnoses and all orders for this visit:    Abnormal EKG/History of dizziness/Essential hypertension   - [id number] score reviewed. Risks vs benefits reviewed. Pt denies side effects on the medication. She will restart xarelto at this time until she sees cardiology for further evaluation.    - Heart rate and rhythm regular today. Patient reports her dizziness is tolerable today-she states this is how she feels when she has not eaten in a while. She reports that she has fasted for 24 hours. I recommend she eat small, frequent scheduled meals to prevent hypoglycemia. Patient is agreeable to the plan. She will eat when leaving the office and call if symptoms worsen or fail to improve   - Performed 30 day heart monitor. Reviewed further evaluation with echo. Pt would like to wait until she sees cardiology. Has cardiology appt next week. - Lifestyle modifications such as exercise, weight loss and healthy diet encouraged and reviewed with the pt. - Red flag warning signs reviewed with the pt and she will go to the ER if these occur. Has appt next month scheduled. Return for as previously scheduled or sooner if needed. Pt will call if symptoms worsen or fail to improve. All questions answered. Pt states no further questions or concerns at this time.    Electronically signed by: Sae Szymanski 07/07/20

## 2020-07-16 ENCOUNTER — OFFICE VISIT (OUTPATIENT)
Dept: CARDIOLOGY CLINIC | Age: 60
End: 2020-07-16
Payer: MEDICARE

## 2020-07-16 VITALS
HEIGHT: 64 IN | BODY MASS INDEX: 45.24 KG/M2 | DIASTOLIC BLOOD PRESSURE: 81 MMHG | RESPIRATION RATE: 20 BRPM | HEART RATE: 71 BPM | SYSTOLIC BLOOD PRESSURE: 120 MMHG | WEIGHT: 265 LBS

## 2020-07-16 PROBLEM — I48.0 PAF (PAROXYSMAL ATRIAL FIBRILLATION) (HCC): Status: ACTIVE | Noted: 2020-07-16

## 2020-07-16 PROCEDURE — 93000 ELECTROCARDIOGRAM COMPLETE: CPT | Performed by: INTERNAL MEDICINE

## 2020-07-16 PROCEDURE — G8417 CALC BMI ABV UP PARAM F/U: HCPCS | Performed by: INTERNAL MEDICINE

## 2020-07-16 PROCEDURE — G8427 DOCREV CUR MEDS BY ELIG CLIN: HCPCS | Performed by: INTERNAL MEDICINE

## 2020-07-16 PROCEDURE — 3017F COLORECTAL CA SCREEN DOC REV: CPT | Performed by: INTERNAL MEDICINE

## 2020-07-16 PROCEDURE — 99215 OFFICE O/P EST HI 40 MIN: CPT | Performed by: INTERNAL MEDICINE

## 2020-07-16 PROCEDURE — 1036F TOBACCO NON-USER: CPT | Performed by: INTERNAL MEDICINE

## 2020-07-16 RX ORDER — CETIRIZINE HYDROCHLORIDE 10 MG/1
10 TABLET ORAL DAILY
COMMUNITY
End: 2020-11-11 | Stop reason: SDUPTHER

## 2020-07-16 RX ORDER — FLECAINIDE ACETATE 50 MG/1
50 TABLET ORAL 2 TIMES DAILY
Qty: 60 TABLET | Refills: 3 | Status: SHIPPED | OUTPATIENT
Start: 2020-07-16 | End: 2020-07-31 | Stop reason: SDUPTHER

## 2020-07-16 NOTE — PROGRESS NOTES
[address]   Electrophysiology Consultation   Date: 7/16/2020  Reason for Consultation: Afib  Consult Requesting Physician: JOSE Brandon CNP      Chief Complaint   Patient presents with    New Patient     Abnormal EKG / Event Monitor        HPI: Mary Ann Ya is a 61 y.o. female with a PMH of PAF, anxiety, asthma, HLD, depression, slep apnea and HTN. She went to urgent care and her ecg showed atrial fibrillation. A monitor was ordered which revealed afib RVR 25% burden    Mauricio Constantino presents to the office as a new patient. She has complaints of fluctuating heart rate and fatigue. Her ECG today shows SR. She does notice her episodes of afib. She states that she is trying to be active to aide in weight loss including walking and swimming. Past Medical History:   Diagnosis Date    Anxiety     Asthma     Back pain     Depression     Fibromyalgia     Hyperlipidemia     Hypertension     Migraines     Prediabetes     Unspecified sleep apnea     Urinary incontinence         Past Surgical History:   Procedure Laterality Date    BREAST ENHANCEMENT SURGERY      COLONOSCOPY N/A 11/27/2019    COLONOSCOPY POLYPECTOMY SNARE/COLD BIOPSY performed by Jamel Leigh MD at [address] Left 2017    TONSILLECTOMY  1968    TUBAL LIGATION         Allergies: Allergies   Allergen Reactions    Latex Rash    Statins Nausea Only       Social History:   reports that she has never smoked. She has never used smokeless tobacco. She reports current alcohol use. She reports that she does not use drugs.      Family History:  family history includes Arthritis in her father and mother; Breast Cancer in her maternal grandmother; COPD in her brother, father, and mother; Cancer in her father, maternal uncle, mother, and other family members; Diabetes in her mother; Glaucoma in her maternal grandmother; Heart Disease in her mother; Ovarian Cancer in her mother; Stroke CREATININE 0.8 2020    CREATININE 0.7 10/16/2019    AST <5 2020    ALT <5 2020       EC20  SR    CT angio 3/18/19  FINDINGS FOR CORONARY CT ANGIOGRAM  CARDIAC FINDINGS:  Pericardium:  Grossly unremarkable  Left atrium:  Grossly unremarkable  Left ventricle:  Grossly unremarkable  Right atrium:  Grossly unremarkable  Right ventricle:  Grossly unremarkable  Mitral valve:  Grossly unremarkable  Aortic valve:  Grossly unremarkable    CORONARY ARTERIES:  Dominance:  Left  Left main:  Unremarkable  LAD and diagonals:  Unremarkable  LCx and OM:  Unremarkable  RCA:  Unremarkable  There is no evidence of myocardial bridge, coronary aneurysm, or coronary artery anomaly    (The following structures were not completely included on this scan)  LUNGS/AIRWAYS:  Unremarkable  PLEURA: Unremarkable  MEDIASTINUM/JAIME:  Unremarkable  AORTA:  Minimal atherosclerotic calcifications  CHEST WALL/LOWER NECK:  Unremarkable  UPPER ABDOMEN:  Unremarkable  BONES:  Mild degenerative changes of the spine  OTHER:  None    IMPRESSION      Total calcium score is 0 which corresponds to no identifiable coronary calcification and less than 10th percentile for age and sex    Normal CT angiogram of coronary arteries    Echo: 2018  Summary:  The left ventricular wall motion is normal.  Overall left ventricular ejection fraction is estimated to be 55-60%. Right ventricular systolic pressure is indeterminate due to poorly  visualized tricuspid regurgitation.   Poor endocardial border visualization  There is borderline concentric left ventricular hypertrophy      Medication:  Current Outpatient Medications   Medication Sig Dispense Refill    cetirizine (ZYRTEC) 10 MG tablet Take 10 mg by mouth daily      rivaroxaban (XARELTO) 20 MG TABS tablet Take 1 tablet by mouth Daily with supper 30 tablet 1    metoprolol succinate (TOPROL XL) 25 MG extended release tablet Take 1 tablet by mouth 2 times daily 180 tablet 0    decreases when AF becomes persistent and last more than 6 months.   -Antiarrhythmic therapy, side effects, benefits and alternative discussed.    -Atrial fibrillation ablation procedure was discussed. We discussed the need for repeat procedure. On average patients may need more than one ablation procedure.    -Risks associated with ablation include but not limited to allergic reaction to the medications, pain, bleeding, infection, nerve injury, injury to diaphragm(breathing muscle), pulmonary embolus(blood clot in lungs), deep vein blood clot, pneumothorax, hemothorax, acute renal failure, cardiac perforation,  tamponade, need for emergent surgery (open heart), permanent pacemaker, pulmonary vein stenosis, left atrial to esophageal fistula, stroke, myocardial infarction and death. Difference between atrial fibrillation and atrial flutter discussed and treatment discussed.   -Flecainide 50 mg BID until her ablation  -We will schedule her for a cryo ablation of afib    HTN  Vitals:    07/16/20 1542   BP: 120/81   Pulse: 71   Resp: 20   -Home BP monitoring encourage with a BP goal <130/80    Obesity  Body mass index is 45.49 kg/m². - Excessive weight is complicating assessment and treatment. It is placing patient at risk for multiple co-morbidities as well as early death and contributing to the patient's presentation. - discussed weight management with diet and exercise   -She tries to stay active with walking and water aerobics    Sleep apnea  -She does not use a cpap at this time but encouraged her to use this. Working on a new mask      She may stop her Xarelto for her shoulder replacement    Plan-  -Work on weight loss  -Schedule cryo afib ablation    - The patient is counseled to follow a low salt diet to assure blood pressure remains controlled for cardiovascular risk factor modification.   - The patient is counseled to avoid excess caffeine, and energy drinks as this may exacerbated ectopy and arrhythmia.    - The patient is counseled to get regular exercise 3-5 times per week to control cardiovascular risk factors. - The patient is counseled to lose weigt to control cardiovascular risk factors. Thank you for allowing me to participate in the care of Ricardo Guadarrama. Further evaluation will be based upon the patient's clinical course and testing results. All questions and concerns were addressed to the patient/family. Alternatives to my treatment were discussed. I have discussed the above stated plan and the patient verbalized understanding and agreed with the plan. NOTE: This report was transcribed using voice recognition software. Every effort was made to ensure accuracy, however, inadvertent computerized transcription errors may be present. Rasheeda Whitman MD, Florec[facility] C[facility] [address] [facility]   Office: [phone]     Scribe attestation:  This note was scribed in the presence of Rasheeda Whitman MD by Charity Gloria RN    I, Dr. Rasheeda Whitman personally performed the services described in this documentation as scribed by RN in my presence, and it is both accurate and complete.

## 2020-07-16 NOTE — LETTER
[facility]  EP Procedure Sheet    7/16/20  Nevin Urrutia  1960  EP Procedures   Pacemaker implant (single/dual)  EP Study    ICD implant (single/dual)  Atrial flutter ablation (NATALIA Y/N)    Biv implant ICD  Tilt Table    Biv implant PPM xxx Atrial fibrillation ablation (NATALIA Yes)    Generator Change (PPM/ICD/BiV)  SVT ablation    Lead revision (RV/LA/RA) (<1 month)  VT ablation      Lead extraction +/- upgrade (BiV/PPM/ICD)  VT Ischemic/ non-ischemic    Loop implant/ removal  VT RVOT    Cardioversion  VT Left sided    NATALIA  AVN ablation     Equipment   [employer]   TIMI Mapping System    [employer] xxx Carto Mapping System    [employer] xxxx CryoAblation    Biotronik  Laser Lead Extraction     EP Procedures Scheduling Request  # hours Requested   Scheduled  Date:   Specific Day  Completed    Anesthesia Yes  F/u Date:   CT surgery backup  COVDI+/-    Overnight stay Yes     Location [facility]       Pre-Procedure Labs / Imaging   PT/INR  Type & cross    CBC  Units PRBC    BMP/Mg  Units FFP    Venogram  CXR    Echo  Cardiac CTA for Pulmonary vein mapping     RN INITIALS: RA    Patient Instructions  Do not eat or drink after midnight the night prior to procedure  Dx: Afib  Hold Xarelto for 1 day/s prior

## 2020-07-29 ENCOUNTER — TELEPHONE (OUTPATIENT)
Dept: CARDIOLOGY CLINIC | Age: 60
End: 2020-07-29

## 2020-07-29 NOTE — PROGRESS NOTES
Preoperative Consultation  Miriam Richards  YOB: 1960    Date of Service:  7/31/2020  Chief Complaint   Patient presents with   Jaeljoe Madison Pre-op Exam     right shoulder Dr. Sari Russo     This patient presents today at the request of the surgeon for a preoperative consultation. Pt reports right shoulder pains for years. She was seen by the surgeon who recommended surgery. She did not bring pre-op paperwork.      Surgeon: Dr. Regina Bang  Indication for surgery: Right shoulder pains  Scheduled procedure: Right shoulder replacement  Date of surgery: 08/19/2020  Location of surgery: [address] [place] [address]  Indicated/required preoperative testing: Cardiac clearance  Smoker: No  Previous anesthesia complications: States she is hard to wake up after surgery  Family history of anesthesia complications: No  Loose, capped or false teeth: No  Recent chest pain or SOB: No  Known Bleeding Risk: No recent or remote history of abnormal bleeding  Personal or FH of DVT/PE: No    Patient objection to receiving blood products: No    Allergies   Allergen Reactions    Latex Rash    Statins Nausea Only     Current Outpatient Medications   Medication Sig Dispense Refill    cetirizine (ZYRTEC) 10 MG tablet Take 10 mg by mouth daily      rivaroxaban (XARELTO) 20 MG TABS tablet Take 1 tablet by mouth Daily with supper 30 tablet 1    metoprolol succinate (TOPROL XL) 25 MG extended release tablet Take 1 tablet by mouth 2 times daily 180 tablet 0    levothyroxine (SYNTHROID) 112 MCG tablet Take 1 tablet by mouth daily 90 tablet 0    colestipol (COLESTID) 1 g tablet Take 1 tablet by mouth 2 times daily 180 tablet 0    montelukast (SINGULAIR) 10 MG tablet Take 1 tablet by mouth nightly 90 tablet 0    albuterol sulfate  (90 Base) MCG/ACT inhaler Inhale 2 puffs into the lungs 4 times daily 1 Inhaler 3    budesonide-formoterol (SYMBICORT) 160-4.5 MCG/ACT AERO Inhale 2 puffs into the lungs 2 times daily 1 Inhaler 0    DULoxetine (CYMBALTA) 60 MG extended release capsule Take 1 capsule by mouth daily 90 capsule 1    vitamin D (ERGOCALCIFEROL) 1.25 MG (71604 UT) CAPS capsule Take 1 capsule by mouth every 14 days 6 capsule 1    gabapentin (NEURONTIN) 300 MG capsule Take 2 capsules by mouth 3 times daily for 90 days. 540 capsule 0    acetaminophen (TYLENOL 8 HOUR) 650 MG extended release tablet Take 650 mg by mouth every 8 hours as needed for Pain      flecainide (TAMBOCOR) 50 MG tablet Take 1 tablet by mouth 2 times daily (Patient not taking: Reported on 7/31/2020) 60 tablet 3     No current facility-administered medications for this visit.       Patient Active Problem List   Diagnosis    Hypertension    Hyperlipidemia    Back pain    Sleep apnea    Urinary incontinence    Class 3 severe obesity due to excess calories without serious comorbidity with body mass index (BMI) of 45.0 to 49.9 in [facility])    Peripheral polyneuropathy    Neurogenic claudication due to lumbar spinal stenosis    Osteoarthritis of spine with radiculopathy, lumbar region    Balance problem    Skin lesion of left leg    Intractable chronic migraine without aura and with status migrainosus    Prediabetes    Depression    Asthma    Fibromyalgia    [facility] fibrillation[facility] ([facility])     Past Medical History:   Diagnosis Date    A-fib ([address]     Anxiety     Asthma     Back pain     Depression     Fibromyalgia     Hyperlipidemia     Hypertension     Migraines     Prediabetes     Unspecified sleep apnea     Urinary incontinence      Past Surgical History:   Procedure Laterality Date    BREAST ENHANCEMENT SURGERY      COLONOSCOPY N/A 11/27/2019    COLONOSCOPY POLYPECTOMY SNARE/COLD BIOPSY performed by Anne Joseph MD at [address] [facility]      KNEE SURGERY Left 2017    TONSILLECTOMY  1968    TUBAL LIGATION       Family History   Problem Relation Age of Onset    Cancer Other     normal range of motion. Neck supple. Cardiovascular: Normal rate, regular rhythm, normal heart sounds and intact distal pulses. Pulmonary/Chest: Effort normal and breath sounds normal. No respiratory distress. She has no wheezes. She has no rales. Abdominal: Soft, non-tender. Bowel sounds are normal.   Musculoskeletal: She exhibits no edema and no tenderness. Neurological: She is alert and oriented to person, place, and time. Skin: Skin is warm and dry. No rash noted. No erythema. Psychiatric: She has a normal mood and affect. Her behavior is normal.     EKG Interpretation:  EKG Interpretation:  Cardiac clearance  Lab Review:  Lab Results   Component Value Date     06/02/2020    K 4.6 06/02/2020     06/02/2020    CO2 24 06/02/2020    BUN 15 06/02/2020    CREATININE 0.8 06/02/2020    GLUCOSE 128 (H) 06/02/2020    CALCIUM 10.1 06/02/2020    PROT 6.4 06/02/2020    LABALBU 4.1 06/02/2020    BILITOT <0.2 06/02/2020    ALKPHOS 90 06/02/2020    AST <5 (A) 06/02/2020    ALT <5 (L) 06/02/2020    LABGLOM >60 06/02/2020    GFRAA >60 06/02/2020    AGRATIO 1.8 06/02/2020    GLOB 2.3 06/02/2020     Lab Results   Component Value Date    WBC 5.9 06/02/2020    HGB 13.3 06/02/2020    HCT 39.6 06/02/2020    MCV 89.3 06/02/2020     06/02/2020     Lab Results   Component Value Date    TSHFT4 2.97 10/16/2019    TSH 0.35 06/02/2020      Assessment:     61 y.o. patient with planned surgery as above. Known risk factors for perioperative complications: HTN; HLD; prediabetes; A-fib; asthma; TONY (not wearing mask-calling to get refitted); States she is hard to wake up after surgery     Plan:     1. Preoperative workup as follows: cardiac clearance  2. Change in medication regimen before surgery: hold xarelto as recommended by cardiology. 3. Deep vein thrombosis prophylaxis: regimen to be chosen by surgical team  4.  No contraindications to planned surgery pending cardiac clearance- surgeon to be aware of above risk factors for perioperative complications    Tearful- mother passed of a-fib after an ablation. She reports being overwhelmed. States she does not have a support system at home. Recommended psychology referral. Pt agreeable. Denies SI/HI. Pt reports that she does not remember being prescribed  Flecainide by cardiology. Per cardiology note 07/16/2020: \"-Flecainide 50 mg BID until her ablation. \" She will reach out to cardiology. Will forward note to cardiology as well. Oct 2- scheduled for ablation per pt. All questions answered. Patient states no further questions or concerns at this time.   Gregg Castle, APRN - [phone]  07/31/20  [facility]  [phone]

## 2020-07-29 NOTE — LETTER
[facility] CARDIOLOGY[address]  Dept: [phone]  Dept Fax: [phone]      2020    Patient:Beronica Mcmillan  : 1960  DOS: 2020    To Whom it May Concern:    Nevin Urrutia has been evaluated for cardiac clearance. Based on diagnostic testing, Nevin Urrutia is considered at a low risk for surgery. There is no further cardiac testing that could be done to lower the risk. She may hold her Xarelto for two days prior to the surgery, but it should be resumed as soon as safely possible following the surgery. Please let my office know if you have any questions or concerns.       America Pratt MD                           [facility] [place] and [place]

## 2020-07-29 NOTE — TELEPHONE ENCOUNTER
CARDIAC CLEARANCE     What type of procedure are you having? Shoulder replacement     Which physician is performing your procedure? Dr flory klein     When is your procedure scheduled for? 8/19/20    Where are you having this procedure? [facility]     Are you taking Blood Thinners?   xarelto    If so what? (Name/dose/frequesncy)     Does the surgeon want you to stop your blood thinner? If so for how long?       Phone Number and Contact Name for Physicians office:  [phone]    Fax number to send information:      CALL PATIENT TO LET HER KNOW THIS WAS DONE

## 2020-07-31 ENCOUNTER — OFFICE VISIT (OUTPATIENT)
Dept: INTERNAL MEDICINE CLINIC | Age: 60
End: 2020-07-31
Payer: MEDICARE

## 2020-07-31 VITALS
HEART RATE: 60 BPM | DIASTOLIC BLOOD PRESSURE: 80 MMHG | BODY MASS INDEX: 46.78 KG/M2 | WEIGHT: 264 LBS | HEIGHT: 63 IN | SYSTOLIC BLOOD PRESSURE: 118 MMHG | TEMPERATURE: 98.3 F

## 2020-07-31 PROCEDURE — G8427 DOCREV CUR MEDS BY ELIG CLIN: HCPCS | Performed by: NURSE PRACTITIONER

## 2020-07-31 PROCEDURE — 99213 OFFICE O/P EST LOW 20 MIN: CPT | Performed by: NURSE PRACTITIONER

## 2020-07-31 PROCEDURE — G8417 CALC BMI ABV UP PARAM F/U: HCPCS | Performed by: NURSE PRACTITIONER

## 2020-07-31 RX ORDER — FLECAINIDE ACETATE 50 MG/1
50 TABLET ORAL 2 TIMES DAILY
Qty: 60 TABLET | Refills: 3 | Status: ON HOLD
Start: 2020-07-31 | End: 2020-10-02 | Stop reason: HOSPADM

## 2020-07-31 ASSESSMENT — ENCOUNTER SYMPTOMS
BACK PAIN: 0
CONSTIPATION: 0
NAUSEA: 0
SORE THROAT: 0
SINUS PAIN: 0
ABDOMINAL PAIN: 0
BLOOD IN STOOL: 0
COUGH: 0
VOMITING: 0
DIARRHEA: 0
WHEEZING: 0
SHORTNESS OF BREATH: 0

## 2020-08-04 ENCOUNTER — TELEPHONE (OUTPATIENT)
Dept: INTERNAL MEDICINE CLINIC | Age: 60
End: 2020-08-04

## 2020-08-04 RX ORDER — GABAPENTIN 300 MG/1
600 CAPSULE ORAL 3 TIMES DAILY
Qty: 540 CAPSULE | Refills: 0 | Status: SHIPPED | OUTPATIENT
Start: 2020-08-04 | End: 2021-02-04 | Stop reason: ALTCHOICE

## 2020-08-04 NOTE — TELEPHONE ENCOUNTER
----- Message from [facility] sent at 8/3/2020  8:48 AM EDT -----  Subject: Message to Provider    QUESTIONS  Information for Provider? Pt states she placed a refill request through   [facility] and was giving a update. Also states [employer] should be sending   something over. ---------------------------------------------------------------------------  --------------  Mable Mac INFO  What is the best way for the office to contact you? OK to leave message on   voicemail  Preferred Call Back Phone Number? [phone]  ---------------------------------------------------------------------------  --------------  SCRIPT ANSWERS  Relationship to Patient?  Self

## 2020-08-05 RX ORDER — LEVOTHYROXINE SODIUM 112 UG/1
112 TABLET ORAL DAILY
Qty: 90 TABLET | Refills: 0 | Status: SHIPPED | OUTPATIENT
Start: 2020-08-05 | End: 2020-08-11 | Stop reason: SDUPTHER

## 2020-08-05 RX ORDER — ERGOCALCIFEROL 1.25 MG/1
50000 CAPSULE ORAL
Qty: 6 CAPSULE | Refills: 1 | Status: SHIPPED | OUTPATIENT
Start: 2020-08-05 | End: 2021-12-09

## 2020-08-05 RX ORDER — MONTELUKAST SODIUM 4 MG/1
1 TABLET, CHEWABLE ORAL 2 TIMES DAILY
Qty: 180 TABLET | Refills: 0 | Status: SHIPPED | OUTPATIENT
Start: 2020-08-05 | End: 2020-08-11 | Stop reason: SDUPTHER

## 2020-08-05 NOTE — TELEPHONE ENCOUNTER
[facility] requested refills on   Colestipol 1 gm  Levothyroxine 112 mcg    Last OV 07/31  Next OV 08/11

## 2020-08-11 ENCOUNTER — OFFICE VISIT (OUTPATIENT)
Dept: INTERNAL MEDICINE CLINIC | Age: 60
End: 2020-08-11
Payer: MEDICARE

## 2020-08-11 VITALS
HEIGHT: 64 IN | TEMPERATURE: 93.7 F | WEIGHT: 266.4 LBS | BODY MASS INDEX: 45.48 KG/M2 | SYSTOLIC BLOOD PRESSURE: 138 MMHG | DIASTOLIC BLOOD PRESSURE: 94 MMHG | HEART RATE: 58 BPM

## 2020-08-11 PROCEDURE — 1036F TOBACCO NON-USER: CPT | Performed by: NURSE PRACTITIONER

## 2020-08-11 PROCEDURE — 99214 OFFICE O/P EST MOD 30 MIN: CPT | Performed by: NURSE PRACTITIONER

## 2020-08-11 PROCEDURE — 3017F COLORECTAL CA SCREEN DOC REV: CPT | Performed by: NURSE PRACTITIONER

## 2020-08-11 PROCEDURE — G8427 DOCREV CUR MEDS BY ELIG CLIN: HCPCS | Performed by: NURSE PRACTITIONER

## 2020-08-11 PROCEDURE — G8417 CALC BMI ABV UP PARAM F/U: HCPCS | Performed by: NURSE PRACTITIONER

## 2020-08-11 RX ORDER — MONTELUKAST SODIUM 4 MG/1
1 TABLET, CHEWABLE ORAL 2 TIMES DAILY
Qty: 180 TABLET | Refills: 0 | Status: SHIPPED | OUTPATIENT
Start: 2020-08-11 | End: 2020-11-11 | Stop reason: SDUPTHER

## 2020-08-11 RX ORDER — MONTELUKAST SODIUM 10 MG/1
10 TABLET ORAL NIGHTLY
Qty: 90 TABLET | Refills: 0 | Status: SHIPPED | OUTPATIENT
Start: 2020-08-11 | End: 2020-11-05

## 2020-08-11 RX ORDER — LEVOTHYROXINE SODIUM 112 UG/1
112 TABLET ORAL DAILY
Qty: 90 TABLET | Refills: 0 | Status: SHIPPED | OUTPATIENT
Start: 2020-08-11 | End: 2020-11-11 | Stop reason: SDUPTHER

## 2020-08-11 RX ORDER — METOPROLOL SUCCINATE 25 MG/1
25 TABLET, EXTENDED RELEASE ORAL 2 TIMES DAILY
Qty: 180 TABLET | Refills: 0 | Status: SHIPPED | OUTPATIENT
Start: 2020-08-11 | End: 2020-11-05

## 2020-08-11 ASSESSMENT — ENCOUNTER SYMPTOMS
SHORTNESS OF BREATH: 0
ABDOMINAL PAIN: 0
CONSTIPATION: 0
WHEEZING: 0
DIARRHEA: 0
NAUSEA: 0
COUGH: 0
VOMITING: 0

## 2020-08-11 NOTE — PROGRESS NOTES
of 45.0 to 49.9 in adult [facility])    Peripheral polyneuropathy    Neurogenic claudication due to lumbar spinal stenosis    Osteoarthritis of spine with radiculopathy, lumbar region    Balance problem    Skin lesion of left leg    Intractable chronic migraine without aura and with status migrainosus    Prediabetes    Depression    Asthma    Fibromyalgia    PAF ([facility]oxysmal [facility] fibrillation) ([facility]     Wt Readings from Last 3 Encounters:   08/11/20 266 lb 6.4 oz (120.8 kg)   07/31/20 264 lb (119.7 kg)   07/16/20 265 lb (120.2 kg)     BP Readings from Last 3 Encounters:   08/11/20 (!) 138/94   07/31/20 118/80   07/16/20 120/81     The 10-year ASCVD risk score (Agnes Pitts., et al., 2013) is: 3.2%    Values used to calculate the score:      Age: 61 years      Sex: Female      Is Non- : No      Diabetic: No      Tobacco smoker: No      Systolic Blood Pressure: 692 mmHg      Is BP treated: No      HDL Cholesterol: 65 mg/dL      Total Cholesterol: 214 mg/dL    Objective/Physical Exam:  BP (!) 138/94 (Site: Left Upper Arm, Position: Sitting)   Pulse 58   Temp 93.7 °F (34.3 °C) (Temporal)   Ht 5' 4\" (1.626 m)   Wt 266 lb 6.4 oz (120.8 kg)   BMI 45.73 kg/m²   Body mass index is 45.73 kg/m². Physical Exam  Vitals signs reviewed. Constitutional:       General: She is not in acute distress. Appearance: She is well-developed. She is not diaphoretic. HENT:      Head: Normocephalic and atraumatic. Eyes:      Pupils: Pupils are equal, round, and reactive to light. Cardiovascular:      Rate and Rhythm: Regular rhythm. Bradycardia present. Pulmonary:      Effort: Pulmonary effort is normal. No respiratory distress. Breath sounds: Normal breath sounds. No wheezing or rales. Chest:      Chest wall: No tenderness. Abdominal:      General: Bowel sounds are normal.   Skin:     General: Skin is warm and dry. Coloration: Skin is not pale. Findings: No erythema or rash. exercise, weight loss and healthy diet encouraged and reviewed with the pt. Return in about 3 months (around 11/11/2020) for HTN/mood/prediabetes/TONY/asthma f/u, or sooner if needed. Pt will call if symptoms worsen or fail to improve. All questions answered. Pt states no further questions or concerns at this time.    Electronically signed by: Katarina Randle 08/11/20

## 2020-08-17 ENCOUNTER — TELEPHONE (OUTPATIENT)
Dept: CARDIOLOGY CLINIC | Age: 60
End: 2020-08-17

## 2020-08-21 ENCOUNTER — TELEPHONE (OUTPATIENT)
Dept: INTERNAL MEDICINE CLINIC | Age: 60
End: 2020-08-21

## 2020-08-21 NOTE — TELEPHONE ENCOUNTER
[address] [facility] Trans[facility] Initial Follow Up Call    Outreach made within 2 business days of discharge: Yes    Patient: Wilian Ontiveros Patient : 1960   MRN: [id number]  Reason for Admission: R. Shoulder pain  Discharge Date: 20      Spoke with: Patient     Discharge department/facility: [facility] Interactive Patient Contact:  Was patient able to fill all prescriptions: Yes  Was patient instructed to bring all medications to the follow-up visit: Yes  Is patient taking all medications as directed in the discharge summary?  Yes  Does patient understand their discharge instructions: Yes  Does patient have questions or concerns that need addressed prior to 7-14 day follow up office visit: No    Scheduled appointment with PCP within 7-14 days    Follow Up  Future Appointments   Date Time Provider Karen Bradley   2020  3:00 PM JOSE Woods CNP [facility]   9/15/2020  9:15 AM Saira Lira MD FF RHEUM [facility]   2020 10:05 AM SCHEDULE, [facility] FLU [facility]   10/2/2020  7:30 AM [facility] CARDIAC CATH LAB ROOM 3 [facility] CATH [facility]   10/19/2020 10:00 AM Stephanie Mtz, PhD Jessica Escobar [facility]   2020  2:20 PM JOSE Woods CNP [facility]       Glenroy Henry MA

## 2020-08-25 ENCOUNTER — VIRTUAL VISIT (OUTPATIENT)
Dept: INTERNAL MEDICINE CLINIC | Age: 60
End: 2020-08-25
Payer: MEDICARE

## 2020-08-25 PROCEDURE — 99442 PR PHYS/QHP TELEPHONE EVALUATION 11-20 MIN: CPT | Performed by: NURSE PRACTITIONER

## 2020-08-25 PROCEDURE — 1111F DSCHRG MED/CURRENT MED MERGE: CPT | Performed by: NURSE PRACTITIONER

## 2020-08-25 RX ORDER — OXYCODONE HYDROCHLORIDE AND ACETAMINOPHEN 5; 325 MG/1; MG/1
1 TABLET ORAL EVERY 6 HOURS PRN
COMMUNITY
Start: 2020-08-20 | End: 2020-08-27

## 2020-08-25 ASSESSMENT — ENCOUNTER SYMPTOMS
COUGH: 0
SHORTNESS OF BREATH: 0
WHEEZING: 0

## 2020-08-25 NOTE — PROGRESS NOTES
Sindhu Garcia is a 61 y.o. female evaluated via telephone on 8/25/2020. Consent:  She and/or health care decision maker is aware that that she may receive a bill for this telephone service, depending on her insurance coverage, and has provided verbal consent to proceed: Yes    Documentation:  I communicated with the patient and/or health care decision maker about hosp f/u. Details of this discussion including any medical advice provided:     Post-Discharge Transitional Care Management Services    Chief Complaint   Patient presents with    [facility] from [facility]". shoulder replacement at [facility]      Allergies   Allergen Reactions    Latex Rash    Statins Nausea Only     Outpatient Medications Marked as Taking for the 8/25/20 encounter (Virtual Visit) with JOSE Rubi - CNP   Medication Sig Dispense Refill    oxyCODONE-acetaminophen (PERCOCET) 5-325 MG per tablet Take 1 tablet by mouth every 6 hours as needed.  montelukast (SINGULAIR) 10 MG tablet Take 1 tablet by mouth nightly 90 tablet 0    metoprolol succinate (TOPROL XL) 25 MG extended release tablet Take 1 tablet by mouth 2 times daily 180 tablet 0    levothyroxine (SYNTHROID) 112 MCG tablet Take 1 tablet by mouth daily 90 tablet 0    colestipol (COLESTID) 1 g tablet Take 1 tablet by mouth 2 times daily 180 tablet 0    vitamin D (ERGOCALCIFEROL) 1.25 MG (65953 UT) CAPS capsule Take 1 capsule by mouth every 14 days 6 capsule 1    gabapentin (NEURONTIN) 300 MG capsule Take 2 capsules by mouth 3 times daily for 90 days.  540 capsule 0    flecainide (TAMBOCOR) 50 MG tablet Take 1 tablet by mouth 2 times daily 60 tablet 3    rivaroxaban (XARELTO) 20 MG TABS tablet Take 1 tablet by mouth Daily with supper 30 tablet 3    cetirizine (ZYRTEC) 10 MG tablet Take 10 mg by mouth daily      albuterol sulfate  (90 Base) MCG/ACT inhaler Inhale 2 puffs into the lungs 4 times daily 1 Inhaler 3    budesonide-formoterol light-headedness, numbness and headaches. Initial post-discharge communication occurred between medical assistant and patient on 08/21/2020- see documentation in chart: telephone encounter. Lydia Calzada was seen today for follow-up from hospital.    Diagnoses and all orders for this visit:    Hospital discharge follow-up/Chronic right shoulder pain   - Pain improving. Performing PT. Following with surgeon. Diagnostic test results reviewed: Labs and Xray shoulder    Patient risk of morbidity and mortality: moderate    Medical Decision Making: moderate complexity    I affirm this is a Patient Initiated Episode with a Patient who has not had a related appointment within my department in the past 7 days or scheduled within the next 24 hours. Follow up - already scheduled. Patient identification was verified at the start of the visit: Yes    Total Time: minutes: 11-20 minutes    Note: not billable if this call serves to triage the patient into an appointment for the relevant concern    All questions answered. Patient states no further questions or concerns at this time.   Electronically signed by: JOSE Cee CNP 08/25/20

## 2020-08-26 ENCOUNTER — TELEPHONE (OUTPATIENT)
Dept: INTERNAL MEDICINE CLINIC | Age: 60
End: 2020-08-26

## 2020-08-26 RX ORDER — DULOXETIN HYDROCHLORIDE 60 MG/1
60 CAPSULE, DELAYED RELEASE ORAL DAILY
Qty: 90 CAPSULE | Refills: 0 | Status: SHIPPED | OUTPATIENT
Start: 2020-08-26 | End: 2020-11-11 | Stop reason: SDUPTHER

## 2020-08-26 NOTE — TELEPHONE ENCOUNTER
Patient is requesting a prescription refill of DULoxetine (CYMBALTA) 60 MG extended release capsule [facility] [address]

## 2020-09-08 ENCOUNTER — HOSPITAL ENCOUNTER (OUTPATIENT)
Dept: PHYSICAL THERAPY | Age: 60
Setting detail: THERAPIES SERIES
Discharge: HOME | End: 2020-09-08
Payer: MEDICARE

## 2020-09-08 PROCEDURE — 97530 THERAPEUTIC ACTIVITIES: CPT

## 2020-09-08 PROCEDURE — 97161 PT EVAL LOW COMPLEX 20 MIN: CPT

## 2020-09-08 PROCEDURE — 97140 MANUAL THERAPY 1/> REGIONS: CPT

## 2020-09-08 NOTE — FLOWSHEET NOTE
Program:   Access Code: [email or web address]   URL: [email or web address]   Date: 09/08/2020   Prepared by: Teto Villarreal     Exercises   Seated Shoulder Flexion Slide at Table Top with Forearm in Neutral - 10 reps - 3 sets - 3 hold - 1x daily - 7x weekly         Therapeutic Exercise and NMR EXR  [] (90664) Provided verbal/tactile cueing for activities related to strengthening, flexibility, endurance, ROM for improvements in  [] LE / Lumbar: LE, proximal hip, and core control with self care, mobility, lifting, ambulation. [] UE / Cervical: cervical, postural, scapular, scapulothoracic and UE control with self care, reaching, carrying, lifting, house/yardwork, driving, computer work.  [] (44115) Provided verbal/tactile cueing for activities related to improving balance, coordination, kinesthetic sense, posture, motor skill, proprioception to assist with   [] LE / lumbar: LE, proximal hip, and core control in self care, mobility, lifting, ambulation and eccentric single leg control. [] UE / cervical: cervical, scapular, scapulothoracic and UE control with self care, reaching, carrying, lifting, house/yardwork, driving, computer work.   [] (22587) Therapist is in constant attendance of 2 or more patients providing skilled therapy interventions, but not providing any significant amount of measurable one-on-one time to either patient, for improvements in  [] LE / lumbar: LE, proximal hip, and core control in self care, mobility, lifting, ambulation and eccentric single leg control. [] UE / cervical: cervical, scapular, scapulothoracic and UE control with self care, reaching, carrying, lifting, house/yardwork, driving, computer work.      NMR and Therapeutic Activities:    [] (23759 or 32540) Provided verbal/tactile cueing for activities related to improving balance, coordination, kinesthetic sense, posture, motor skill, proprioception and motor activation to allow for proper function of   [] LE: / Lumbar core, proximal hip and LE with self care and ADLs  [] UE / Cervical: cervical, postural, scapular, scapulothoracic and UE control with self care, carrying, lifting, driving, computer work.   [] (07640) Gait Re-education- Provided training and instruction to the patient for proper LE, core and proximal hip recruitment and positioning and eccentric body weight control with ambulation re-education including up and down stairs     Home Management Training / Self Care:  [] (40318) Provided self-care/home management training related to activities of daily living and compensatory training, and/or use of adaptive equipment for improvement with: ADLs and compensatory training, meal preparation, safety procedures and instruction in use of adaptive equipment, including bathing, grooming, dressing, personal hygiene, basic household cleaning and chores.      Home Exercise Program:    [x] (95709) Reviewed/Progressed HEP activities related to strengthening, flexibility, endurance, ROM of   [] LE / Lumbar: core, proximal hip and LE for functional self-care, mobility, lifting and ambulation/stair navigation   [] UE / Cervical: cervical, postural, scapular, scapulothoracic and UE control with self care, reaching, carrying, lifting, house/yardwork, driving, computer work  [] (49071)Reviewed/Progressed HEP activities related to improving balance, coordination, kinesthetic sense, posture, motor skill, proprioception of   [] LE: core, proximal hip and LE for self care, mobility, lifting, and ambulation/stair navigation    [] UE / Cervical: cervical, postural,  scapular, scapulothoracic and UE control with self care, reaching, carrying, lifting, house/yardwork, driving, computer work    Manual Treatments:  PROM / STM / Oscillations-Mobs:  G-I, II, III, IV (PA's, Inf., Post.)  [x] (99260) Provided manual therapy to mobilize LE, proximal hip and/or LS spine soft tissue/joints for the purpose of modulating pain, promoting relaxation,  increasing ROM, reducing/eliminating soft tissue swelling/inflammation/restriction, improving soft tissue extensibility and allowing for proper ROM for normal function with   [] LE / lumbar: self care, mobility, lifting and ambulation. [] UE / Cervical: self care, reaching, carrying, lifting, house/yardwork, driving, computer work. Modalities:  [] (69009) Vasopneumatic compression: Utilized vasopneumatic compression to decrease edema / swelling for the purpose of improving mobility and quad tone / recruitment which will allow for increased overall function including but not limited to self-care, transfers, ambulation, and ascending / descending stairs. Modalities:      Charges:  Timed Code Treatment Minutes: 28   Total Treatment Minutes: 50     [] EVAL - LOW (22385)   [] EVAL - MOD (40170)  [x] EVAL - HIGH (78793)  [] RE-EVAL (39546)  [] KH(15043) x       [] Ionto  [] NMR (65081) x       [] Vaso  [x] Manual (73941) x       [] Ultrasound  [x] TA x        [] Mech Traction (61594)  [] Aquatic Therapy x     [] ES (un) (02186):   [] Home Management Training x  [] ES(attended) (79027)   [] Dry Needling 1-2 muscles (10724):  [] Dry Needling 3+ muscles (810287)  [] Group:      [] Other:     GOALS:  Patient stated goal: To get back to swimming   []? Progressing: []? Met: []? Not Met: []? Adjusted     Therapist goals for Patient:   Short Term Goals: To be achieved in: 2 weeks  1. Independent in HEP and progression per patient tolerance, in order to prevent re-injury. []? Progressing: []? Met: []? Not Met: []? Adjusted  2. Patient will have a decrease in pain to facilitate improvement in movement, function, and ADLs as indicated by Functional Deficits. []? Progressing: []? Met: []? Not Met: []? Adjusted     Long Term Goals: To be achieved in: 6 weeks/ DC   1. Disability index score of 30% or less for the  Quick DASH to assist with reaching prior level of function. []? Progressing: []? Met: []? Not Met: []? Adjusted  2. Patient will demonstrate increased AROM to right shoulder by 15 deg to allow for proper joint functioning as indicated by patients Functional Deficits. []? Progressing: []? Met: []? Not Met: []? Adjusted  3. Patient will demonstrate an increase in right shoulder Strength to 4/5 to allow for proper functional mobility as indicated by patients Functional Deficits. []? Progressing: []? Met: []? Not Met: []? Adjusted  4. Patient will return to functional activities including swimming without increased symptoms or restriction. []? Progressing: []? Met: []? Not Met: []? Adjusted  5. Patient to be able to perform self care /grooming activities with right shoulder w/o symptoms or restriction. []? Progressing: []? Met: []? Not Met: []? Adjusted          Overall Progression Towards Functional goals/ Treatment Progress Update:  [] Patient is progressing as expected towards functional goals listed. [] Progression is slowed due to complexities/Impairments listed. [] Progression has been slowed due to co-morbidities.   [x] Plan just implemented, too soon to assess goals progression <30days   [] Goals require adjustment due to lack of progress  [] Patient is not progressing as expected and requires additional follow up with physician  [] Other    Persisting Functional Limitations/Impairments:  [x]Sleeping []Sitting               []Standing []Transfers        []Walking []Kneeling               []Stairs []Squatting / bending   []ADLs []Reaching  []Lifting  []Housework  []Driving []Job related tasks  []Sports/Recreation [x]Other: overhead movement/ self care         ASSESSMENT:  See eval  Treatment/Activity Tolerance:  [] Patient able to complete tx [] Patient limited by fatigue  [] Patient limited by pain  [] Patient limited by other medical complications  [] Other:     Prognosis: [] Good [] Fair  [] Poor    Patient Requires Follow-up: [x] Yes  [] No    Plan for next treatment session:    PLAN: See eval. PT 2x / week for 6 weeks. [] Continue per plan of care [] Alter current plan (see comments)  [x] Plan of care initiated [] Hold pending MD visit [] Discharge    Electronically signed by: Nikky Hill PT, DPT    Note: If patient does not return for scheduled/ recommended follow up visits, this note will serve as a discharge from care along with most recent update on progress.

## 2020-09-08 NOTE — PLAN OF CARE
[address] [address]  Phone: [phone]   Fax: [phone]                                                     Physical Therapy Certification    Dear Referring Practitioner: Julieth Valencia MD,    We had the pleasure of evaluating the following patient for physical therapy services at [address] [place] [address]. A summary of our findings can be found in the initial assessment below. This includes our plan of care. If you have any questions or concerns regarding these findings, please do not hesitate to contact me at the office phone number checked above. Thank you for the referral.       Physician Signature:_______________________________Date:__________________  By signing above (or electronic signature), therapists plan is approved by physician      Patient: Nevin Urrutia   : 1960   MRN: [id number]  Referring Physician: Referring Practitioner: Julieth Valencia MD      Evaluation Date: 2020      Medical Diagnosis Information:  Diagnosis: S/P Right Total shoulder sx   Treatment Diagnosis: Right shoulder pain , decrease AROM, and strength                                         Insurance information: PT Insurance Information: [employer]    Precautions/ Contra-indications: Avoiding IR per Total Shoulder protocol   Latex Allergy:  [x]NO      []YES  Preferred Language for Healthcare:   [x]English       []other:    SUBJECTIVE: Patient stated complaint: Patient recently had a right Total shoulder joint surgery 20.  Reports that she had been dealing with chronic shoulder pain for 3 years prior     Relevant Medical History: OA, Fibromyalgia,    Functional Outcome: Quick DASH: raw score 31=; dysfunction = 56%    Pain Scale: 2-5/10  Easing factors:   Resting   Provocative factors: Increase movement     Type: []Constant   [x]Intermittent  []Radiating []Localized []other:     Numbness/Tingling: Right hand Occupation/School: retired   Living Status/Prior Level of Function: Prior to this injury / incident, pt was independent with ADLs and IADLs, Patient lives with spouse in ranch style home . OBJECTIVE:     Hand dominance:  Left hand     Palpation: Tenderness into anterior joint capsule     Functional Mobility/Transfers:  Mod I     Posture: Round shoulders     Bandages/Dressings/Incisions: Sling      Dermatomes Normal Abnormal Comments   Top of head (C1) x     Posterior occipital region (C2) x     Side of neck (C3) x     Top of shoulder (C4)      Lateral deltoid (C5)      Tip of thumb (C6) x     Distal middle finger (C7)  x    Distal fifth finger (C8)  x    Medial forearm (T1)              Reflexes Normal Abnormal Comments   C5-6 Biceps   Unable to asses R   C5-6 Brachioradialis   Unable to asses R   C7-8 Triceps  x Right shoulder   Coreas x          PROM AROM    L R L R   Cervical Flexion  [place]   Cervical Extension  [facility]   Cervical Rotation [facility]   Cervical Side-bend [facility] WFL   Shoulder Flexion   130  117   Shoulder Abduction   85  74   Shoulder External Rotation        Shoulder Internal Rotation        Elbow Flexion  [facility] WFL   Elbow Extension        Pronation        Supination        Wrist Flexion        Wrist Extension        Radial Deviation        Ulnar Deviation            Strength (0-5) Left Right    Shoulder Shrug (C4) 4+ +4   Shoulder Flex 5 3-   Shoulder Abd (C5) 5 3-   Shoulder ER 5 3-   Shoulder IR 5 3-   Biceps (C6) 5    Triceps (C7) 5    Lats     Middle Trap     Rhomboids     Lower Trap      Pronation  3+ -4   Supination  3+ -4   Wrist Flex (C7) 3+ -4   Wrist Ext (C6) 3+ -4   Wrist Radial Deviation 3+ -4   Wrist Ulnar Deviation 3+ -4                  Joint mobility:    []Normal    [x]Hypo   []Hyper      Orthopaedic Special Tests Positive  Negative  NT Comments    Shoulder        Neer    x    Bruno-Paula   x    Empty Can   x    Drop Arm   x fibromyalgia     Falls Risk Assessment (30 days):   [x] Falls Risk assessed and no intervention required. [] Falls Risk assessed and Patient requires intervention due to being higher risk   TUG score (>12s at risk):     [] Falls education provided, including       ASSESSMENT:   Functional Impairments   []Noted spinal or UE joint hypomobility   []Noted spinal or UE joint hypermobility   []Decreased UE functional ROM   [x]Decreased UE functional strength   []Abnormal reflexes/sensation/myotomal/dermatomal deficits   []Decreased RC/scapular/core strength and neuromuscular control   []other:      Functional Activity Limitations (from functional questionnaire and intake)   []Reduced ability to tolerate prolonged functional positions   []Reduced ability or difficulty with changes of positions or transfers between positions   []Reduced ability to maintain good posture and demonstrate good body mechanics with sitting, bending, and lifting   [] Reduced ability or tolerance with driving and/or computer work   []Reduced ability to sleep   []Reduced ability to perform lifting, reaching, carrying tasks   [x]Reduced ability to tolerate impact through UE   [x]Reduced ability to reach behind back   []Reduced ability to  or hold objects   []Reduced ability to throw or toss an object   []other:    Participation Restrictions   [x]Reduced participation in self care activities   []Reduced participation in home management activities   []Reduced participation in work activities   [x]Reduced participation in social activities. []Reduced participation in sport/recreation activities. Classification:   [x]Signs/symptoms consistent with post-surgical status including decreased ROM, strength and function.   []Signs/symptoms consistent with joint sprain/strain   []Signs/symptoms consistent with shoulder impingement   []Signs/symptoms consistent with shoulder/elbow/wrist tendinopathy   []Signs/symptoms consistent with Rotator cuff tear   []Signs/symptoms consistent with labral tear   []Signs/symptoms consistent with postural dysfunction    []Signs/symptoms consistent with Glenohumeral IR Deficit - <45 degrees   []Signs/symptoms consistent with facet dysfunction of cervical/thoracic spine    []Signs/symptoms consistent with pathology which may benefit from Dry needling     []other:     Prognosis/Rehab Potential:      [x]Excellent   []Good    []Fair   []Poor    Tolerance of evaluation/treatment:    [x]Excellent   []Good    []Fair   []Poor    Physical Therapy Evaluation Complexity Justification  [x] A history of present problem with:  [] no personal factors and/or comorbidities that impact the plan of care;  [x]1-2 personal factors and/or comorbidities that impact the plan of care  []3 personal factors and/or comorbidities that impact the plan of care  [x] An examination of body systems using standardized tests and measures addressing any of the following: body structures and functions (impairments), activity limitations, and/or participation restrictions;:  [x] a total of 1-2 or more elements   [] a total of 3 or more elements   [] a total of 4 or more elements   [x] A clinical presentation with:  [x] stable and/or uncomplicated characteristics   [] evolving clinical presentation with changing characteristics  [] unstable and unpredictable characteristics;   [x] Clinical decision making of [x] low, [] moderate, [] high complexity using standardized patient assessment instrument and/or measurable assessment of functional outcome.     [x] EVAL (LOW) 59973 (typically 15 minutes face-to-face)  [] EVAL (MOD) 82362 (typically 30 minutes face-to-face)  [] EVAL (HIGH) 52130 (typically 45 minutes face-to-face)  [] RE-EVAL     PLAN:  Frequency/Duration:  2 days per week for 6 Weeks:  INTERVENTIONS:  [x] Therapeutic exercise including: strength training, ROM, for Upper extremity and core   [x]  NMR activation and proprioception for UE, scap and Core   [x] Manual therapy as indicated for shoulder, scapula and spine to include: Dry Needling/IASTM, STM, PROM, Gr I-IV mobilizations, manipulation. [x] Modalities as needed that may include: thermal agents, E-stim, Biofeedback, US, iontophoresis as indicated  [x] Patient education on joint protection, postural re-education, activity modification, progression of HEP. HEP instruction: Written HEP instructions provided and reviewed    GOALS:  Patient stated goal: To get back to swimming   [] Progressing: [] Met: [] Not Met: [] Adjusted    Therapist goals for Patient:   Short Term Goals: To be achieved in: 2 weeks  1. Independent in HEP and progression per patient tolerance, in order to prevent re-injury. [] Progressing: [] Met: [] Not Met: [] Adjusted  2. Patient will have a decrease in pain to facilitate improvement in movement, function, and ADLs as indicated by Functional Deficits. [] Progressing: [] Met: [] Not Met: [] Adjusted    Long Term Goals: To be achieved in: 6 weeks/ DC   1. Disability index score of 30% or less for the  Quick DASH to assist with reaching prior level of function. [] Progressing: [] Met: [] Not Met: [] Adjusted  2. Patient will demonstrate increased AROM to right shoulder by 15 deg to allow for proper joint functioning as indicated by patients Functional Deficits. [] Progressing: [] Met: [] Not Met: [] Adjusted  3. Patient will demonstrate an increase in right shoulder Strength to 4/5 to allow for proper functional mobility as indicated by patients Functional Deficits. [] Progressing: [] Met: [] Not Met: [] Adjusted  4. Patient will return to functional activities including swimming without increased symptoms or restriction. [] Progressing: [] Met: [] Not Met: [] Adjusted  5. Patient to be able to perform self care /grooming activities with right shoulder w/o symptoms or restriction.      [] Progressing: [] Met: [] Not Met: [] Adjusted     Electronically signed by:  Cecilia Garcia PT

## 2020-09-10 ENCOUNTER — HOSPITAL ENCOUNTER (OUTPATIENT)
Dept: PHYSICAL THERAPY | Age: 60
Setting detail: THERAPIES SERIES
Discharge: HOME | End: 2020-09-10
Payer: MEDICARE

## 2020-09-10 PROCEDURE — 97140 MANUAL THERAPY 1/> REGIONS: CPT

## 2020-09-10 PROCEDURE — 97110 THERAPEUTIC EXERCISES: CPT

## 2020-09-10 NOTE — FLOWSHEET NOTE
Pt. Education:  -patient educated on diagnosis, prognosis and expectations for rehab  -all patient questions were answered    Home Exercise Program:   Access Code: [email or web address]   URL: [email or web address]   Date: 09/08/2020   Prepared by: Kimberly Standing     Exercises   Seated Shoulder Flexion Slide at Table Top with Forearm in Neutral - 10 reps - 3 sets - 3 hold - 1x daily - 7x weekly         Therapeutic Exercise and NMR EXR  [] (65247) Provided verbal/tactile cueing for activities related to strengthening, flexibility, endurance, ROM for improvements in  [] LE / Lumbar: LE, proximal hip, and core control with self care, mobility, lifting, ambulation. [] UE / Cervical: cervical, postural, scapular, scapulothoracic and UE control with self care, reaching, carrying, lifting, house/yardwork, driving, computer work.  [] (92457) Provided verbal/tactile cueing for activities related to improving balance, coordination, kinesthetic sense, posture, motor skill, proprioception to assist with   [] LE / lumbar: LE, proximal hip, and core control in self care, mobility, lifting, ambulation and eccentric single leg control. [] UE / cervical: cervical, scapular, scapulothoracic and UE control with self care, reaching, carrying, lifting, house/yardwork, driving, computer work.   [] (83539) Therapist is in constant attendance of 2 or more patients providing skilled therapy interventions, but not providing any significant amount of measurable one-on-one time to either patient, for improvements in  [] LE / lumbar: LE, proximal hip, and core control in self care, mobility, lifting, ambulation and eccentric single leg control. [] UE / cervical: cervical, scapular, scapulothoracic and UE control with self care, reaching, carrying, lifting, house/yardwork, driving, computer work.      NMR and Therapeutic Activities:    [] (55136 or ) Provided verbal/tactile cueing for activities related to (45415) Provided manual therapy to mobilize LE, proximal hip and/or LS spine soft tissue/joints for the purpose of modulating pain, promoting relaxation,  increasing ROM, reducing/eliminating soft tissue swelling/inflammation/restriction, improving soft tissue extensibility and allowing for proper ROM for normal function with   [] LE / lumbar: self care, mobility, lifting and ambulation. [] UE / Cervical: self care, reaching, carrying, lifting, house/yardwork, driving, computer work. Modalities:  [] (39594) Vasopneumatic compression: Utilized vasopneumatic compression to decrease edema / swelling for the purpose of improving mobility and quad tone / recruitment which will allow for increased overall function including but not limited to self-care, transfers, ambulation, and ascending / descending stairs. Modalities:      Charges:  Timed Code Treatment Minutes: 43   Total Treatment Minutes: 53     [] EVAL - LOW (92773)   [] EVAL - MOD (01671)  [] EVAL - HIGH (45614)  [] RE-EVAL (79440)  [x] NN(51495) x  2     [] Ionto  [] NMR (42627) x       [] Vaso  [x] Manual (45119) x 1      [] Ultrasound  [] TA x        [] Mech Traction (94004)  [] Aquatic Therapy x     [] ES (un) (96962):   [] Home Management Training x  [] ES(attended) (75366)   [] Dry Needling 1-2 muscles (20994):  [] Dry Needling 3+ muscles (134677)  [] Group:      [] Other:     GOALS:  Patient stated goal: To get back to swimming   []? Progressing: []? Met: []? Not Met: []? Adjusted     Therapist goals for Patient:   Short Term Goals: To be achieved in: 2 weeks  1. Independent in HEP and progression per patient tolerance, in order to prevent re-injury. []? Progressing: []? Met: []? Not Met: []? Adjusted  2. Patient will have a decrease in pain to facilitate improvement in movement, function, and ADLs as indicated by Functional Deficits. []? Progressing: []? Met: []? Not Met: []? Adjusted     Long Term Goals:  To be achieved in: 6 weeks/ DC 1. Disability index score of 30% or less for the  Quick DASH to assist with reaching prior level of function. []? Progressing: []? Met: []? Not Met: []? Adjusted  2. Patient will demonstrate increased AROM to right shoulder by 15 deg to allow for proper joint functioning as indicated by patients Functional Deficits. []? Progressing: []? Met: []? Not Met: []? Adjusted  3. Patient will demonstrate an increase in right shoulder Strength to 4/5 to allow for proper functional mobility as indicated by patients Functional Deficits. []? Progressing: []? Met: []? Not Met: []? Adjusted  4. Patient will return to functional activities including swimming without increased symptoms or restriction. []? Progressing: []? Met: []? Not Met: []? Adjusted  5. Patient to be able to perform self care /grooming activities with right shoulder w/o symptoms or restriction. []? Progressing: []? Met: []? Not Met: []? Adjusted          Overall Progression Towards Functional goals/ Treatment Progress Update:  [] Patient is progressing as expected towards functional goals listed. [] Progression is slowed due to complexities/Impairments listed. [] Progression has been slowed due to co-morbidities. [x] Plan just implemented, too soon to assess goals progression <30days   [] Goals require adjustment due to lack of progress  [] Patient is not progressing as expected and requires additional follow up with physician  [] Other    Persisting Functional Limitations/Impairments:  [x]Sleeping []Sitting               []Standing []Transfers        []Walking []Kneeling               []Stairs []Squatting / bending   []ADLs []Reaching  []Lifting  []Housework  []Driving []Job related tasks  []Sports/Recreation [x]Other: overhead movement/ self care         ASSESSMENT:  Patient tolerated right shoulder AROM exercises per protocol. Pt required cues to reduce upper trap compensation during shoulder isometric exercises.  The patient tolerated PROM in all planes with noticeable joint capsule tightness anteriorly/inferiorly . Pt to further benefit from right shoulder rehab per protocol for AROM progression as tolerated. Treatment/Activity Tolerance:  [] Patient able to complete tx [] Patient limited by fatigue  [] Patient limited by pain  [] Patient limited by other medical complications  [] Other:     Prognosis: [] Good [] Fair  [] Poor    Patient Requires Follow-up: [x] Yes  [] No    Plan for next treatment session:    PLAN: See eval. PT 2x / week for 6 weeks. [x] Continue per plan of care [] Alter current plan (see comments)  [] Plan of care initiated [] Hold pending MD visit [] Discharge    Electronically signed by: Melisa Serrano PT, DPT    Note: If patient does not return for scheduled/ recommended follow up visits, this note will serve as a discharge from care along with most recent update on progress.

## 2020-09-14 ENCOUNTER — HOSPITAL ENCOUNTER (OUTPATIENT)
Dept: PHYSICAL THERAPY | Age: 60
Setting detail: THERAPIES SERIES
Discharge: HOME | End: 2020-09-14
Payer: MEDICARE

## 2020-09-14 PROCEDURE — 97110 THERAPEUTIC EXERCISES: CPT

## 2020-09-14 PROCEDURE — 97140 MANUAL THERAPY 1/> REGIONS: CPT

## 2020-09-14 NOTE — FLOWSHEET NOTE
[address] [facility]  Phone: [phone]   Fax: [phone]    Physical Therapy Daily Treatment Note  Date:  2020    Patient Name:  Tania Mccoy    :  1960  MRN: [id number]  Medical/Treatment Diagnosis Information:  · Diagnosis: S/P Right Total shoulder sx  · Treatment Diagnosis: Right shoulder pain , decrease AROM, and strength  Insurance/Certification information:  PT Insurance Information: [employer]  Physician Information:  Referring Practitioner: Abby Norton MD  Plan of care signed (Y/N): []  Yes [x]  No     Date of Patient follow up with Physician: MARIELA     Progress Report: []  Yes  [x]  No     Date Range for reporting period:  Beginnin20  Ending:    Progress report due (10 Rx/or 30 days whichever is less): visit #34     Recertification due (POC duration/ or 90 days whichever is less):     Visit # Insurance Allowable Auth required? Date Range   3/12 [place] []  Yes  [x]  No Na     Latex Allergy:  [x]NO      []YES  Preferred Language for Healthcare:   [x]English       []other:    Functional Scale:        Date assessed:  Quick Dash: raw score = 31; dysfunction = 56%  20    Pain level:  0/10     SUBJECTIVE:  Week 4 Patient reports that she's been having right plantar fascia pain. No changes     OBJECTIVE: : moderate antalgic gait on R foot       RESTRICTIONS/PRECAUTIONS:  Following Total Shoulder protocol ( Junior's) Avoiding IR     Exercises/Interventions:     Therapeutic Exercises (77992) Resistance / level Sets/sec Reps Notes   Pulleys to 90 deg flexion/scap 2  min      Table slides       Scapular retraction       Mid rows  High rows  Bicep curls Lime T-band    Blue T-band 2  2 10x  10x     Added    Wand exercises;   Flexion/abd  to 90 deg  ER @ 45 deg     10-15  10    Isometrics:  Add/abd, ER/IR with Pillow     10                         Therapeutic Activities (93800) Neuromuscular Re-ed (18038)                                                 Manual Intervention ([id number])                                                     Pt. Education:  -patient educated on diagnosis, prognosis and expectations for rehab  -all patient questions were answered    Home Exercise Program:   Access Code: [email or web address]   URL: [email or web address]   Date: 09/08/2020   Prepared by: Delvis Mathias     Exercises   Seated Shoulder Flexion Slide at Table Top with Forearm in Neutral - 10 reps - 3 sets - 3 hold - 1x daily - 7x weekly         Therapeutic Exercise and NMR EXR  [] (25756) Provided verbal/tactile cueing for activities related to strengthening, flexibility, endurance, ROM for improvements in  [] LE / Lumbar: LE, proximal hip, and core control with self care, mobility, lifting, ambulation. [] UE / Cervical: cervical, postural, scapular, scapulothoracic and UE control with self care, reaching, carrying, lifting, house/yardwork, driving, computer work.  [] (88527) Provided verbal/tactile cueing for activities related to improving balance, coordination, kinesthetic sense, posture, motor skill, proprioception to assist with   [] LE / lumbar: LE, proximal hip, and core control in self care, mobility, lifting, ambulation and eccentric single leg control. [] UE / cervical: cervical, scapular, scapulothoracic and UE control with self care, reaching, carrying, lifting, house/yardwork, driving, computer work.   [] (64110) Therapist is in constant attendance of 2 or more patients providing skilled therapy interventions, but not providing any significant amount of measurable one-on-one time to either patient, for improvements in  [] LE / lumbar: LE, proximal hip, and core control in self care, mobility, lifting, ambulation and eccentric single leg control.    [] UE / cervical: cervical, scapular, scapulothoracic and UE control with self care, reaching, carrying, lifting, house/yardwork, driving, computer driving, computer work    Manual Treatments:  PROM / STM / Oscillations-Mobs:  G-I, II, III, IV (PA's, Inf., Post.)  [x] (06887) Provided manual therapy to mobilize LE, proximal hip and/or LS spine soft tissue/joints for the purpose of modulating pain, promoting relaxation,  increasing ROM, reducing/eliminating soft tissue swelling/inflammation/restriction, improving soft tissue extensibility and allowing for proper ROM for normal function with   [] LE / lumbar: self care, mobility, lifting and ambulation. [] UE / Cervical: self care, reaching, carrying, lifting, house/yardwork, driving, computer work. Modalities:  [] (24964) Vasopneumatic compression: Utilized vasopneumatic compression to decrease edema / swelling for the purpose of improving mobility and quad tone / recruitment which will allow for increased overall function including but not limited to self-care, transfers, ambulation, and ascending / descending stairs. Modalities:      Charges:  Timed Code Treatment Minutes: 45   Total Treatment Minutes: 55     [] EVAL - LOW (05281)   [] EVAL - MOD (90358)  [] EVAL - HIGH (14115)  [] RE-EVAL (91423)  [x] QV(92284) x  2     [] Ionto  [] NMR (90178) x       [] Vaso  [x] Manual (01355) x 1      [] Ultrasound  [] TA x        [] Mech Traction (35005)  [] Aquatic Therapy x     [] ES (un) (31481):   [] Home Management Training x  [] ES(attended) (39426)   [] Dry Needling 1-2 muscles (13997):  [] Dry Needling 3+ muscles (173594)  [] Group:      [] Other:     GOALS:  Patient stated goal: To get back to swimming   []? Progressing: []? Met: []? Not Met: []? Adjusted     Therapist goals for Patient:   Short Term Goals: To be achieved in: 2 weeks  1. Independent in HEP and progression per patient tolerance, in order to prevent re-injury. []? Progressing: []? Met: []? Not Met: []? Adjusted  2.  Patient will have a decrease in pain to facilitate improvement in movement, function, and ADLs as indicated by Functional Deficits. []? Progressing: []? Met: []? Not Met: []? Adjusted     Long Term Goals: To be achieved in: 6 weeks/ DC   1. Disability index score of 30% or less for the  Quick DASH to assist with reaching prior level of function. []? Progressing: []? Met: []? Not Met: []? Adjusted  2. Patient will demonstrate increased AROM to right shoulder by 15 deg to allow for proper joint functioning as indicated by patients Functional Deficits. []? Progressing: []? Met: []? Not Met: []? Adjusted  3. Patient will demonstrate an increase in right shoulder Strength to 4/5 to allow for proper functional mobility as indicated by patients Functional Deficits. []? Progressing: []? Met: []? Not Met: []? Adjusted  4. Patient will return to functional activities including swimming without increased symptoms or restriction. []? Progressing: []? Met: []? Not Met: []? Adjusted  5. Patient to be able to perform self care /grooming activities with right shoulder w/o symptoms or restriction. []? Progressing: []? Met: []? Not Met: []? Adjusted          Overall Progression Towards Functional goals/ Treatment Progress Update:  [] Patient is progressing as expected towards functional goals listed. [] Progression is slowed due to complexities/Impairments listed. [] Progression has been slowed due to co-morbidities.   [x] Plan just implemented, too soon to assess goals progression <30days   [] Goals require adjustment due to lack of progress  [] Patient is not progressing as expected and requires additional follow up with physician  [] Other    Persisting Functional Limitations/Impairments:  [x]Sleeping []Sitting               []Standing []Transfers        []Walking []Kneeling               []Stairs []Squatting / bending   []ADLs []Reaching  []Lifting  []Housework  []Driving []Job related tasks  []Sports/Recreation [x]Other: overhead movement/ self care         ASSESSMENT: 9/14 Patient continued AROM exercises per shoulder protocol without any complications . Added bicep curls with blue T-band . Patient able to demonstrate increase PROM to end range per shoulder protocol in all planes. Will continue to  progress parascapula exercises as tolerated per protocol to assist AROM. Patient tolerated right shoulder AROM exercises per protocol. Pt required cues to reduce upper trap compensation during shoulder isometric exercises. The patient tolerated PROM in all planes with noticeable joint capsule tightness anteriorly/inferiorly . Pt to further benefit from right shoulder rehab per protocol for AROM progression as tolerated. Treatment/Activity Tolerance:  [] Patient able to complete tx [] Patient limited by fatigue  [] Patient limited by pain  [] Patient limited by other medical complications  [] Other:     Prognosis: [] Good [] Fair  [] Poor    Patient Requires Follow-up: [x] Yes  [] No    Plan for next treatment session:    PLAN: See eval. PT 2x / week for 6 weeks. [x] Continue per plan of care [] Alter current plan (see comments)  [] Plan of care initiated [] Hold pending MD visit [] Discharge    Electronically signed by: Netta Vallejo PT, DPT    Note: If patient does not return for scheduled/ recommended follow up visits, this note will serve as a discharge from care along with most recent update on progress.

## 2020-09-15 ENCOUNTER — VIRTUAL VISIT (OUTPATIENT)
Dept: RHEUMATOLOGY | Age: 60
End: 2020-09-15
Payer: MEDICARE

## 2020-09-15 PROCEDURE — 3017F COLORECTAL CA SCREEN DOC REV: CPT | Performed by: INTERNAL MEDICINE

## 2020-09-15 PROCEDURE — 99213 OFFICE O/P EST LOW 20 MIN: CPT | Performed by: INTERNAL MEDICINE

## 2020-09-15 PROCEDURE — G8427 DOCREV CUR MEDS BY ELIG CLIN: HCPCS | Performed by: INTERNAL MEDICINE

## 2020-09-15 NOTE — PROGRESS NOTES
Subjective:      Patient ID: Heidi Onofre is a 61 y.o. female. HPI                            the patient returns for follow-up of fibromyalgia. She continues on Cymbalta 60 mg a day and she is on Neurontin 600 mg twice daily. She is doing well she recently had right shoulder surgery and she is doing physical therapy    Review of Systems minimal pain    Objective:   Physical Exam alert female no acute distress respirations normal patient is able to abduct right shoulder to at least 90 degrees  . Assessment:      Fibromyalgia      Plan:      The patient will continue current medications. I will see her back in 6 months time.         Elpidio Adams MD

## 2020-09-15 NOTE — PROGRESS NOTES
Imani Valdez is a 61 y.o. female being evaluated by a Virtual Visit (video visit) encounter to address concerns as mentioned above. A caregiver was present when appropriate. Due to this being a TeleHealth encounter (During ACROL-40 public health emergency), evaluation of the following organ systems was limited: Vitals/Constitutional/EENT/Resp/CV/GI//MS/Neuro/Skin/Heme-Lymph-Imm. Pursuant to the emergency declaration under the [address] [place] [address] and the [employer] Act, this Virtual Visit was conducted with patient's (and/or legal guardian's) consent, to reduce the patient's risk of exposure to COVID-19 and provide necessary medical care. The patient (and/or legal guardian) has also been advised to contact this office for worsening conditions or problems, and seek emergency medical treatment and/or call 911 if deemed necessary. Patient identification was verified at the start of the visit: Yes    Total time spent for this encounter: Not billed by time    Services were provided through a video synchronous discussion virtually to substitute for in-person clinic visit. Patient and provider were located at their individual homes. --Renuka Ross RN on 9/15/2020 at 9:23 AM    An electronic signature was used to authenticate this note.

## 2020-09-16 ENCOUNTER — HOSPITAL ENCOUNTER (OUTPATIENT)
Dept: PHYSICAL THERAPY | Age: 60
Setting detail: THERAPIES SERIES
Discharge: HOME | End: 2020-09-16
Payer: MEDICARE

## 2020-09-16 PROCEDURE — 97110 THERAPEUTIC EXERCISES: CPT

## 2020-09-16 PROCEDURE — 97140 MANUAL THERAPY 1/> REGIONS: CPT

## 2020-09-16 NOTE — FLOWSHEET NOTE
Neuromuscular Re-ed (79758)                                                 Manual Intervention ([id number])       PROM all planes  8 mins                                            Pt. Education:  -patient educated on diagnosis, prognosis and expectations for rehab  -all patient questions were answered    Home Exercise Program:   Access Code: [email or web address]   URL: [email or web address]   Date: 09/08/2020   Prepared by: Meenakshi Bermanhead     Exercises   Seated Shoulder Flexion Slide at Table Top with Forearm in Neutral - 10 reps - 3 sets - 3 hold - 1x daily - 7x weekly         Therapeutic Exercise and NMR EXR  [] (29046) Provided verbal/tactile cueing for activities related to strengthening, flexibility, endurance, ROM for improvements in  [] LE / Lumbar: LE, proximal hip, and core control with self care, mobility, lifting, ambulation. [] UE / Cervical: cervical, postural, scapular, scapulothoracic and UE control with self care, reaching, carrying, lifting, house/yardwork, driving, computer work.  [] (39939) Provided verbal/tactile cueing for activities related to improving balance, coordination, kinesthetic sense, posture, motor skill, proprioception to assist with   [] LE / lumbar: LE, proximal hip, and core control in self care, mobility, lifting, ambulation and eccentric single leg control. [] UE / cervical: cervical, scapular, scapulothoracic and UE control with self care, reaching, carrying, lifting, house/yardwork, driving, computer work.   [] (94756) Therapist is in constant attendance of 2 or more patients providing skilled therapy interventions, but not providing any significant amount of measurable one-on-one time to either patient, for improvements in  [] LE / lumbar: LE, proximal hip, and core control in self care, mobility, lifting, ambulation and eccentric single leg control.    [] UE / cervical: cervical, scapular, scapulothoracic and UE control with self care, reaching, carrying, lifting, house/yardwork, driving, computer work. NMR and Therapeutic Activities:    [] (01919 or 54411) Provided verbal/tactile cueing for activities related to improving balance, coordination, kinesthetic sense, posture, motor skill, proprioception and motor activation to allow for proper function of   [] LE: / Lumbar core, proximal hip and LE with self care and ADLs  [] UE / Cervical: cervical, postural, scapular, scapulothoracic and UE control with self care, carrying, lifting, driving, computer work.   [] (12630) Gait Re-education- Provided training and instruction to the patient for proper LE, core and proximal hip recruitment and positioning and eccentric body weight control with ambulation re-education including up and down stairs     Home Management Training / Self Care:  [] (97360) Provided self-care/home management training related to activities of daily living and compensatory training, and/or use of adaptive equipment for improvement with: ADLs and compensatory training, meal preparation, safety procedures and instruction in use of adaptive equipment, including bathing, grooming, dressing, personal hygiene, basic household cleaning and chores.      Home Exercise Program:    [x] (79875) Reviewed/Progressed HEP activities related to strengthening, flexibility, endurance, ROM of   [] LE / Lumbar: core, proximal hip and LE for functional self-care, mobility, lifting and ambulation/stair navigation   [] UE / Cervical: cervical, postural, scapular, scapulothoracic and UE control with self care, reaching, carrying, lifting, house/yardwork, driving, computer work  [] (80994)Reviewed/Progressed HEP activities related to improving balance, coordination, kinesthetic sense, posture, motor skill, proprioception of   [] LE: core, proximal hip and LE for self care, mobility, lifting, and ambulation/stair navigation    [] UE / Cervical: cervical, postural,  scapular, scapulothoracic and UE control with self care,

## 2020-09-23 ENCOUNTER — HOSPITAL ENCOUNTER (OUTPATIENT)
Dept: PHYSICAL THERAPY | Age: 60
Setting detail: THERAPIES SERIES
Discharge: HOME | End: 2020-09-23
Payer: MEDICARE

## 2020-09-23 PROCEDURE — 97110 THERAPEUTIC EXERCISES: CPT

## 2020-09-23 PROCEDURE — 97140 MANUAL THERAPY 1/> REGIONS: CPT

## 2020-09-23 NOTE — FLOWSHEET NOTE
[address] [facility]  Phone: [phone]   Fax: [phone]    Physical Therapy Daily Treatment Note  Date:  2020    Patient Name:  Ha Cheek    :  1960  MRN: [id number]  Medical/Treatment Diagnosis Information:  · Diagnosis: S/P Right Total shoulder sx  · Treatment Diagnosis: Right shoulder pain , decrease AROM, and strength  Insurance/Certification information:  PT Insurance Information: G[employer] O  Physician Information:  Referring Practitioner: Xenia Villeda MD  Plan of care signed (Y/N): []  Yes [x]  No     Date of Patient follow up with Physician: MARIELA     Progress Report: []  Yes  [x]  No     Date Range for reporting period:  Beginnin20  Ending:    Progress report due (10 Rx/or 30 days whichever is less): visit #90     Recertification due (POC duration/ or 90 days whichever is less):     Visit # Insurance Allowable Auth required? Date Range    Mn []  Yes  [x]  No Na     Latex Allergy:  [x]NO      []YES  Preferred Language for Healthcare:   [x]English       []other:    Functional Scale:        Date assessed:  Quick Dash: raw score = 31; dysfunction = 56%  20    Pain level:  0/10     SUBJECTIVE:  Week 5 : Patient reports that her shoulder has progressed along well     OBJECTIVE: : moderate antalgic gait on R foot       RESTRICTIONS/PRECAUTIONS:  Following Total Shoulder protocol ( Junior's) Avoiding IR     Exercises/Interventions:     Therapeutic Exercises (92410) Resistance / level Sets/sec Reps Notes   Pulleys to 90 deg flexion/scap 2  min      Table top : Flexion/abd , circles        Scapular retraction       Mid rows  High rows  Bicep curls-  Bicep brachai  Tricep  Lime T-band    Blue T-band 2  2  2  2  2 10x  10x  10x  10x  10x     Added    Wand exercises;   Flexion/abd performed seated to 90 det  Supine Flexion  to 110 deg  ER @ 45 deg     10  5    Isometrics:  Add/abd, ER/IR with Pillow     10 Therapeutic Activities (43918)                                          Neuromuscular Re-ed (73639)                                                 Manual Intervention ([phone])       PROM all planes  8 mins                                            Pt. Education:  -patient educated on diagnosis, prognosis and expectations for rehab  -all patient questions were answered    Home Exercise Program:   Access Code: [email or web address]   URL: [email or web address]   Date: 09/08/2020   Prepared by: Jyoti Peed     Exercises   Seated Shoulder Flexion Slide at Table Top with Forearm in Neutral - 10 reps - 3 sets - 3 hold - 1x daily - 7x weekly         Therapeutic Exercise and NMR EXR  [] (35884) Provided verbal/tactile cueing for activities related to strengthening, flexibility, endurance, ROM for improvements in  [] LE / Lumbar: LE, proximal hip, and core control with self care, mobility, lifting, ambulation. [] UE / Cervical: cervical, postural, scapular, scapulothoracic and UE control with self care, reaching, carrying, lifting, house/yardwork, driving, computer work.  [] (27996) Provided verbal/tactile cueing for activities related to improving balance, coordination, kinesthetic sense, posture, motor skill, proprioception to assist with   [] LE / lumbar: LE, proximal hip, and core control in self care, mobility, lifting, ambulation and eccentric single leg control. [] UE / cervical: cervical, scapular, scapulothoracic and UE control with self care, reaching, carrying, lifting, house/yardwork, driving, computer work.   [] (24356) Therapist is in constant attendance of 2 or more patients providing skilled therapy interventions, but not providing any significant amount of measurable one-on-one time to either patient, for improvements in  [] LE / lumbar: LE, proximal hip, and core control in self care, mobility, lifting, ambulation and eccentric single leg control.    [] UE / cervical: cervical, scapular, scapulothoracic and UE control with self care, reaching, carrying, lifting, house/yardwork, driving, computer work. NMR and Therapeutic Activities:    [] (12642 or 62127) Provided verbal/tactile cueing for activities related to improving balance, coordination, kinesthetic sense, posture, motor skill, proprioception and motor activation to allow for proper function of   [] LE: / Lumbar core, proximal hip and LE with self care and ADLs  [] UE / Cervical: cervical, postural, scapular, scapulothoracic and UE control with self care, carrying, lifting, driving, computer work.   [] (56265) Gait Re-education- Provided training and instruction to the patient for proper LE, core and proximal hip recruitment and positioning and eccentric body weight control with ambulation re-education including up and down stairs     Home Management Training / Self Care:  [] (72644) Provided self-care/home management training related to activities of daily living and compensatory training, and/or use of adaptive equipment for improvement with: ADLs and compensatory training, meal preparation, safety procedures and instruction in use of adaptive equipment, including bathing, grooming, dressing, personal hygiene, basic household cleaning and chores.      Home Exercise Program:    [x] (91216) Reviewed/Progressed HEP activities related to strengthening, flexibility, endurance, ROM of   [] LE / Lumbar: core, proximal hip and LE for functional self-care, mobility, lifting and ambulation/stair navigation   [] UE / Cervical: cervical, postural, scapular, scapulothoracic and UE control with self care, reaching, carrying, lifting, house/yardwork, driving, computer work  [] (92265)Reviewed/Progressed HEP activities related to improving balance, coordination, kinesthetic sense, posture, motor skill, proprioception of   [] LE: core, proximal hip and LE for self care, mobility, lifting, and ambulation/stair navigation    [] UE / Cervical: cervical, postural,  scapular, scapulothoracic and UE control with self care, reaching, carrying, lifting, house/yardwork, driving, computer work    Manual Treatments:  PROM / STM / Oscillations-Mobs:  G-I, II, III, IV (PA's, Inf., Post.)  [x] (71855) Provided manual therapy to mobilize LE, proximal hip and/or LS spine soft tissue/joints for the purpose of modulating pain, promoting relaxation,  increasing ROM, reducing/eliminating soft tissue swelling/inflammation/restriction, improving soft tissue extensibility and allowing for proper ROM for normal function with   [] LE / lumbar: self care, mobility, lifting and ambulation. [] UE / Cervical: self care, reaching, carrying, lifting, house/yardwork, driving, computer work. Modalities:  [] (38973) Vasopneumatic compression: Utilized vasopneumatic compression to decrease edema / swelling for the purpose of improving mobility and quad tone / recruitment which will allow for increased overall function including but not limited to self-care, transfers, ambulation, and ascending / descending stairs. Modalities: 9/8, 9/10, 9/15, 9/23 CP to Right shoulder supine with bolster underneath BLEs    Charges:  Timed Code Treatment Minutes: 40   Total Treatment Minutes: 52     [] EVAL - LOW (89542)   [] EVAL - MOD (50226)  [] EVAL - HIGH (34805)  [] RE-EVAL (03740)  [x] IJ(53418) x  2     [] Ionto  [] NMR (54562) x       [] Vaso  [x] Manual (27183) x 1      [] Ultrasound  [] TA x        [] Mech Traction (66417)  [] Aquatic Therapy x     [] ES (un) (26484):   [] Home Management Training x  [] ES(attended) (62820)   [] Dry Needling 1-2 muscles (45017):  [] Dry Needling 3+ muscles (386945)  [] Group:      [] Other:     GOALS:  Patient stated goal: To get back to swimming   []? Progressing: []? Met: []? Not Met: []? Adjusted     Therapist goals for Patient:   Short Term Goals: To be achieved in: 2 weeks  1.  Independent in HEP and progression per patient tolerance, in order to bending   []ADLs []Reaching  []Lifting  []Housework  []Driving []Job related tasks  []Sports/Recreation [x]Other: overhead movement/ self care         ASSESSMENT: 9/23: Added Tricep extension and bicep brachai exercises. Patient continued isometrics , AAROM, and PROM. Patient able to show improvement passively with right shoulder Exernal rotation. Will continue per PT Total shoulder protocol . 9/16 : Patient tolerated AAROM and PROM to per protocol . Patient was able to perform TAble top AAROM with Swiss without complications . Patient today was a little guarded with PROM during manual therapy. Patient to further benefit from PT to maximize functional mobility. 9/14 Patient continued AROM exercises per shoulder protocol without any complications . Added bicep curls with blue T-band . Patient able to demonstrate increase PROM to end range per shoulder protocol in all planes. Will continue to  progress parascapula exercises as tolerated per protocol to assist AROM. Treatment/Activity Tolerance:  [] Patient able to complete tx [] Patient limited by fatigue  [] Patient limited by pain  [] Patient limited by other medical complications  [] Other:     Prognosis: [] Good [] Fair  [] Poor    Patient Requires Follow-up: [x] Yes  [] No    Plan for next treatment session:    PLAN: See eval. PT 2x / week for 6 weeks. [x] Continue per plan of care [] Alter current plan (see comments)  [] Plan of care initiated [] Hold pending MD visit [] Discharge    Electronically signed by: Meenakshi Guzman PT, DPT    Note: If patient does not return for scheduled/ recommended follow up visits, this note will serve as a discharge from care along with most recent update on progress.

## 2020-09-28 ENCOUNTER — OFFICE VISIT (OUTPATIENT)
Dept: PRIMARY CARE CLINIC | Age: 60
End: 2020-09-28
Payer: MEDICARE

## 2020-09-28 PROCEDURE — 99211 OFF/OP EST MAY X REQ PHY/QHP: CPT | Performed by: NURSE PRACTITIONER

## 2020-09-28 PROCEDURE — G8428 CUR MEDS NOT DOCUMENT: HCPCS | Performed by: NURSE PRACTITIONER

## 2020-09-28 PROCEDURE — G8417 CALC BMI ABV UP PARAM F/U: HCPCS | Performed by: NURSE PRACTITIONER

## 2020-09-28 NOTE — PROGRESS NOTES
Chrissy Spann received a viral test for COVID-19. They were educated on isolation and quarantine as appropriate. For any symptoms, they were directed to seek care from their PCP, given contact information to establish with a doctor, directed to an urgent care or the emergency room.

## 2020-09-28 NOTE — PATIENT INSTRUCTIONS

## 2020-09-29 ENCOUNTER — APPOINTMENT (OUTPATIENT)
Dept: PHYSICAL THERAPY | Age: 60
End: 2020-09-29
Payer: MEDICARE

## 2020-09-29 LAB — SARS-COV-2, NAA: NOT DETECTED

## 2020-10-01 ENCOUNTER — APPOINTMENT (OUTPATIENT)
Dept: PHYSICAL THERAPY | Age: 60
End: 2020-10-01
Payer: MEDICARE

## 2020-10-02 ENCOUNTER — ANESTHESIA EVENT (OUTPATIENT)
Dept: CARDIAC CATH/INVASIVE PROCEDURES | Age: 60
End: 2020-10-02
Payer: MEDICARE

## 2020-10-02 ENCOUNTER — HOSPITAL ENCOUNTER (OUTPATIENT)
Dept: CARDIAC CATH/INVASIVE PROCEDURES | Age: 60
Discharge: HOME | End: 2020-10-03
Attending: INTERNAL MEDICINE | Admitting: INTERNAL MEDICINE
Payer: MEDICARE

## 2020-10-02 ENCOUNTER — ANESTHESIA (OUTPATIENT)
Dept: CARDIAC CATH/INVASIVE PROCEDURES | Age: 60
End: 2020-10-02
Payer: MEDICARE

## 2020-10-02 VITALS
OXYGEN SATURATION: 97 % | TEMPERATURE: 97.3 F | SYSTOLIC BLOOD PRESSURE: 113 MMHG | DIASTOLIC BLOOD PRESSURE: 68 MMHG | RESPIRATION RATE: 12 BRPM

## 2020-10-02 LAB
A/G RATIO: 1.8 (ref 1.1–2.2)
ABO/RH: NORMAL
ALBUMIN SERPL-MCNC: 4.3 G/DL (ref 3.4–5)
ALP BLD-CCNC: 103 U/L (ref 40–129)
ALT SERPL-CCNC: 21 U/L (ref 10–40)
ANION GAP SERPL CALCULATED.3IONS-SCNC: 10 MMOL/L (ref 3–16)
ANTIBODY SCREEN: NORMAL
AST SERPL-CCNC: 16 U/L (ref 15–37)
BILIRUB SERPL-MCNC: 0.4 MG/DL (ref 0–1)
BUN BLDV-MCNC: 13 MG/DL (ref 7–20)
CALCIUM SERPL-MCNC: 10.1 MG/DL (ref 8.3–10.6)
CHLORIDE BLD-SCNC: 106 MMOL/L (ref 99–110)
CO2: 25 MMOL/L (ref 21–32)
CREAT SERPL-MCNC: 0.7 MG/DL (ref 0.6–1.2)
EKG ATRIAL RATE: 87 BPM
EKG DIAGNOSIS: NORMAL
EKG P AXIS: 72 DEGREES
EKG P-R INTERVAL: 228 MS
EKG Q-T INTERVAL: 406 MS
EKG QRS DURATION: 90 MS
EKG QTC CALCULATION (BAZETT): 488 MS
EKG R AXIS: 11 DEGREES
EKG T AXIS: 50 DEGREES
EKG VENTRICULAR RATE: 87 BPM
GFR AFRICAN AMERICAN: >60
GFR NON-AFRICAN AMERICAN: >60
GLOBULIN: 2.4 G/DL
GLUCOSE BLD-MCNC: 104 MG/DL (ref 70–99)
HCT VFR BLD CALC: 39.2 % (ref 36–48)
HEMOGLOBIN: 13 G/DL (ref 12–16)
INR BLD: 1.34 (ref 0.86–1.14)
MAGNESIUM: 2.4 MG/DL (ref 1.8–2.4)
MCH RBC QN AUTO: 28.5 PG (ref 26–34)
MCHC RBC AUTO-ENTMCNC: 33 G/DL (ref 31–36)
MCV RBC AUTO: 86.3 FL (ref 80–100)
PDW BLD-RTO: 14.6 % (ref 12.4–15.4)
PLATELET # BLD: 222 K/UL (ref 135–450)
PMV BLD AUTO: 7.9 FL (ref 5–10.5)
POC ACT LR: 299 SEC
POC ACT LR: 316 SEC
POC ACT LR: 360 SEC
POC ACT LR: >400 SEC
POTASSIUM SERPL-SCNC: 4.4 MMOL/L (ref 3.5–5.1)
PROTHROMBIN TIME: 15.6 SEC (ref 10–13.2)
RBC # BLD: 4.55 M/UL (ref 4–5.2)
SODIUM BLD-SCNC: 141 MMOL/L (ref 136–145)
TOTAL PROTEIN: 6.7 G/DL (ref 6.4–8.2)
WBC # BLD: 4.3 K/UL (ref 4–11)

## 2020-10-02 PROCEDURE — 6370000000 HC RX 637 (ALT 250 FOR IP): Performed by: INTERNAL MEDICINE

## 2020-10-02 PROCEDURE — 93312 ECHO TRANSESOPHAGEAL: CPT

## 2020-10-02 PROCEDURE — 36415 COLL VENOUS BLD VENIPUNCTURE: CPT

## 2020-10-02 PROCEDURE — 3700000000 HC ANESTHESIA ATTENDED CARE

## 2020-10-02 PROCEDURE — 7100000000 HC PACU RECOVERY - FIRST 15 MIN

## 2020-10-02 PROCEDURE — 6360000002 HC RX W HCPCS

## 2020-10-02 PROCEDURE — 93622 COMP EP EVAL L VENTR PAC&REC: CPT

## 2020-10-02 PROCEDURE — 2709999900 HC NON-CHARGEABLE SUPPLY

## 2020-10-02 PROCEDURE — 93613 INTRACARDIAC EPHYS 3D MAPG: CPT

## 2020-10-02 PROCEDURE — 2580000003 HC RX 258

## 2020-10-02 PROCEDURE — 93662 INTRACARDIAC ECG (ICE): CPT | Performed by: INTERNAL MEDICINE

## 2020-10-02 PROCEDURE — 93655 ICAR CATH ABLTJ DSCRT ARRHYT: CPT | Performed by: INTERNAL MEDICINE

## 2020-10-02 PROCEDURE — C1760 CLOSURE DEV, VASC: HCPCS

## 2020-10-02 PROCEDURE — 2580000003 HC RX 258: Performed by: NURSE ANESTHETIST, CERTIFIED REGISTERED

## 2020-10-02 PROCEDURE — 93320 DOPPLER ECHO COMPLETE: CPT

## 2020-10-02 PROCEDURE — 86901 BLOOD TYPING SEROLOGIC RH(D): CPT

## 2020-10-02 PROCEDURE — 93656 COMPRE EP EVAL ABLTJ ATR FIB: CPT

## 2020-10-02 PROCEDURE — 93662 INTRACARDIAC ECG (ICE): CPT

## 2020-10-02 PROCEDURE — 93613 INTRACARDIAC EPHYS 3D MAPG: CPT | Performed by: INTERNAL MEDICINE

## 2020-10-02 PROCEDURE — 86900 BLOOD TYPING SEROLOGIC ABO: CPT

## 2020-10-02 PROCEDURE — 2500000003 HC RX 250 WO HCPCS: Performed by: NURSE ANESTHETIST, CERTIFIED REGISTERED

## 2020-10-02 PROCEDURE — 2580000003 HC RX 258: Performed by: INTERNAL MEDICINE

## 2020-10-02 PROCEDURE — 7100000001 HC PACU RECOVERY - ADDTL 15 MIN

## 2020-10-02 PROCEDURE — 2720000010 HC SURG SUPPLY STERILE

## 2020-10-02 PROCEDURE — C1732 CATH, EP, DIAG/ABL, 3D/VECT: HCPCS

## 2020-10-02 PROCEDURE — 85347 COAGULATION TIME ACTIVATED: CPT

## 2020-10-02 PROCEDURE — 93622 COMP EP EVAL L VENTR PAC&REC: CPT | Performed by: INTERNAL MEDICINE

## 2020-10-02 PROCEDURE — 85027 COMPLETE CBC AUTOMATED: CPT

## 2020-10-02 PROCEDURE — 93010 ELECTROCARDIOGRAM REPORT: CPT | Performed by: INTERNAL MEDICINE

## 2020-10-02 PROCEDURE — 83735 ASSAY OF MAGNESIUM: CPT

## 2020-10-02 PROCEDURE — C1769 GUIDE WIRE: HCPCS

## 2020-10-02 PROCEDURE — C1894 INTRO/SHEATH, NON-LASER: HCPCS

## 2020-10-02 PROCEDURE — 93656 COMPRE EP EVAL ABLTJ ATR FIB: CPT | Performed by: INTERNAL MEDICINE

## 2020-10-02 PROCEDURE — 93325 DOPPLER ECHO COLOR FLOW MAPG: CPT

## 2020-10-02 PROCEDURE — 3700000001 HC ADD 15 MINUTES (ANESTHESIA)

## 2020-10-02 PROCEDURE — C1730 CATH, EP, 19 OR FEW ELECT: HCPCS

## 2020-10-02 PROCEDURE — 93623 PRGRMD STIMJ&PACG IV RX NFS: CPT | Performed by: INTERNAL MEDICINE

## 2020-10-02 PROCEDURE — 2500000003 HC RX 250 WO HCPCS

## 2020-10-02 PROCEDURE — 6360000002 HC RX W HCPCS: Performed by: NURSE ANESTHETIST, CERTIFIED REGISTERED

## 2020-10-02 PROCEDURE — 93655 ICAR CATH ABLTJ DSCRT ARRHYT: CPT

## 2020-10-02 PROCEDURE — 86850 RBC ANTIBODY SCREEN: CPT

## 2020-10-02 PROCEDURE — 93005 ELECTROCARDIOGRAM TRACING: CPT | Performed by: INTERNAL MEDICINE

## 2020-10-02 PROCEDURE — 80053 COMPREHEN METABOLIC PANEL: CPT

## 2020-10-02 PROCEDURE — C1759 CATH, INTRA ECHOCARDIOGRAPHY: HCPCS

## 2020-10-02 PROCEDURE — 85610 PROTHROMBIN TIME: CPT

## 2020-10-02 PROCEDURE — 93623 PRGRMD STIMJ&PACG IV RX NFS: CPT

## 2020-10-02 RX ORDER — SENNOSIDES 8.6 MG
650 CAPSULE ORAL EVERY 8 HOURS PRN
Status: DISCONTINUED | OUTPATIENT
Start: 2020-10-02 | End: 2020-10-02 | Stop reason: DRUGHIGH

## 2020-10-02 RX ORDER — ALBUTEROL SULFATE 2.5 MG/3ML
2.5 SOLUTION RESPIRATORY (INHALATION) EVERY 6 HOURS PRN
Status: DISCONTINUED | OUTPATIENT
Start: 2020-10-02 | End: 2020-10-03 | Stop reason: HOSPADM

## 2020-10-02 RX ORDER — DEXAMETHASONE SODIUM PHOSPHATE 4 MG/ML
INJECTION, SOLUTION INTRA-ARTICULAR; INTRALESIONAL; INTRAMUSCULAR; INTRAVENOUS; SOFT TISSUE PRN
Status: DISCONTINUED | OUTPATIENT
Start: 2020-10-02 | End: 2020-10-02 | Stop reason: SDUPTHER

## 2020-10-02 RX ORDER — FENTANYL CITRATE 50 UG/ML
INJECTION, SOLUTION INTRAMUSCULAR; INTRAVENOUS PRN
Status: DISCONTINUED | OUTPATIENT
Start: 2020-10-02 | End: 2020-10-02 | Stop reason: SDUPTHER

## 2020-10-02 RX ORDER — SODIUM CHLORIDE 9 MG/ML
INJECTION, SOLUTION INTRAVENOUS CONTINUOUS PRN
Status: DISCONTINUED | OUTPATIENT
Start: 2020-10-02 | End: 2020-10-02 | Stop reason: SDUPTHER

## 2020-10-02 RX ORDER — LEVOTHYROXINE SODIUM 112 UG/1
112 TABLET ORAL DAILY
Status: DISCONTINUED | OUTPATIENT
Start: 2020-10-03 | End: 2020-10-03 | Stop reason: HOSPADM

## 2020-10-02 RX ORDER — SUCCINYLCHOLINE/SOD CL,ISO/PF 200MG/10ML
SYRINGE (ML) INTRAVENOUS PRN
Status: DISCONTINUED | OUTPATIENT
Start: 2020-10-02 | End: 2020-10-02 | Stop reason: SDUPTHER

## 2020-10-02 RX ORDER — ONDANSETRON 2 MG/ML
4 INJECTION INTRAMUSCULAR; INTRAVENOUS EVERY 6 HOURS PRN
Status: DISCONTINUED | OUTPATIENT
Start: 2020-10-02 | End: 2020-10-03 | Stop reason: HOSPADM

## 2020-10-02 RX ORDER — ONDANSETRON 2 MG/ML
INJECTION INTRAMUSCULAR; INTRAVENOUS PRN
Status: DISCONTINUED | OUTPATIENT
Start: 2020-10-02 | End: 2020-10-02 | Stop reason: SDUPTHER

## 2020-10-02 RX ORDER — SODIUM CHLORIDE 0.9 % (FLUSH) 0.9 %
10 SYRINGE (ML) INJECTION EVERY 12 HOURS SCHEDULED
Status: DISCONTINUED | OUTPATIENT
Start: 2020-10-02 | End: 2020-10-03 | Stop reason: HOSPADM

## 2020-10-02 RX ORDER — HEPARIN SODIUM 1000 [USP'U]/ML
INJECTION, SOLUTION INTRAVENOUS; SUBCUTANEOUS PRN
Status: DISCONTINUED | OUTPATIENT
Start: 2020-10-02 | End: 2020-10-02 | Stop reason: SDUPTHER

## 2020-10-02 RX ORDER — ROCURONIUM BROMIDE 10 MG/ML
INJECTION, SOLUTION INTRAVENOUS PRN
Status: DISCONTINUED | OUTPATIENT
Start: 2020-10-02 | End: 2020-10-02 | Stop reason: SDUPTHER

## 2020-10-02 RX ORDER — CETIRIZINE HYDROCHLORIDE 10 MG/1
10 TABLET ORAL DAILY
Status: DISCONTINUED | OUTPATIENT
Start: 2020-10-03 | End: 2020-10-03 | Stop reason: HOSPADM

## 2020-10-02 RX ORDER — PANTOPRAZOLE SODIUM 40 MG/1
40 TABLET, DELAYED RELEASE ORAL
Status: DISCONTINUED | OUTPATIENT
Start: 2020-10-02 | End: 2020-10-03 | Stop reason: HOSPADM

## 2020-10-02 RX ORDER — SODIUM CHLORIDE 0.9 % (FLUSH) 0.9 %
10 SYRINGE (ML) INJECTION PRN
Status: DISCONTINUED | OUTPATIENT
Start: 2020-10-02 | End: 2020-10-03 | Stop reason: HOSPADM

## 2020-10-02 RX ORDER — MONTELUKAST SODIUM 10 MG/1
10 TABLET ORAL NIGHTLY
Status: DISCONTINUED | OUTPATIENT
Start: 2020-10-02 | End: 2020-10-03 | Stop reason: HOSPADM

## 2020-10-02 RX ORDER — ACETAMINOPHEN 325 MG/1
650 TABLET ORAL EVERY 4 HOURS PRN
Status: DISCONTINUED | OUTPATIENT
Start: 2020-10-02 | End: 2020-10-03 | Stop reason: HOSPADM

## 2020-10-02 RX ORDER — PANTOPRAZOLE SODIUM 40 MG/1
40 TABLET, DELAYED RELEASE ORAL
Qty: 30 TABLET | Refills: 0 | Status: SHIPPED | OUTPATIENT
Start: 2020-10-02 | End: 2020-11-05

## 2020-10-02 RX ORDER — DULOXETIN HYDROCHLORIDE 60 MG/1
60 CAPSULE, DELAYED RELEASE ORAL DAILY
Status: DISCONTINUED | OUTPATIENT
Start: 2020-10-03 | End: 2020-10-03 | Stop reason: HOSPADM

## 2020-10-02 RX ORDER — BUDESONIDE AND FORMOTEROL FUMARATE DIHYDRATE 160; 4.5 UG/1; UG/1
2 AEROSOL RESPIRATORY (INHALATION) 2 TIMES DAILY
Status: DISCONTINUED | OUTPATIENT
Start: 2020-10-02 | End: 2020-10-03 | Stop reason: HOSPADM

## 2020-10-02 RX ORDER — LIDOCAINE HYDROCHLORIDE 20 MG/ML
INJECTION, SOLUTION EPIDURAL; INFILTRATION; INTRACAUDAL; PERINEURAL PRN
Status: DISCONTINUED | OUTPATIENT
Start: 2020-10-02 | End: 2020-10-02 | Stop reason: SDUPTHER

## 2020-10-02 RX ORDER — METOPROLOL SUCCINATE 25 MG/1
25 TABLET, EXTENDED RELEASE ORAL 2 TIMES DAILY
Status: DISCONTINUED | OUTPATIENT
Start: 2020-10-02 | End: 2020-10-03 | Stop reason: HOSPADM

## 2020-10-02 RX ORDER — PROPOFOL 10 MG/ML
INJECTION, EMULSION INTRAVENOUS PRN
Status: DISCONTINUED | OUTPATIENT
Start: 2020-10-02 | End: 2020-10-02 | Stop reason: SDUPTHER

## 2020-10-02 RX ORDER — ERGOCALCIFEROL 1.25 MG/1
50000 CAPSULE ORAL
Status: DISCONTINUED | OUTPATIENT
Start: 2020-10-02 | End: 2020-10-02

## 2020-10-02 RX ORDER — GABAPENTIN 300 MG/1
600 CAPSULE ORAL 2 TIMES DAILY
Status: DISCONTINUED | OUTPATIENT
Start: 2020-10-02 | End: 2020-10-03 | Stop reason: HOSPADM

## 2020-10-02 RX ADMIN — Medication 200 MG: at 07:48

## 2020-10-02 RX ADMIN — SODIUM CHLORIDE: 9 INJECTION, SOLUTION INTRAVENOUS at 07:00

## 2020-10-02 RX ADMIN — DEXAMETHASONE SODIUM PHOSPHATE 4 MG: 4 INJECTION, SOLUTION INTRAMUSCULAR; INTRAVENOUS at 07:55

## 2020-10-02 RX ADMIN — PHENYLEPHRINE HYDROCHLORIDE 50 MCG/MIN: 10 INJECTION INTRAVENOUS at 08:09

## 2020-10-02 RX ADMIN — ISOPROTERENOL HYDROCHLORIDE 10 MCG/MIN: 0.2 INJECTION, SOLUTION INTRAMUSCULAR; INTRAVENOUS at 09:40

## 2020-10-02 RX ADMIN — Medication 10 ML: at 21:33

## 2020-10-02 RX ADMIN — RIVAROXABAN 20 MG: 20 TABLET, FILM COATED ORAL at 19:23

## 2020-10-02 RX ADMIN — METOPROLOL SUCCINATE 25 MG: 25 TABLET, EXTENDED RELEASE ORAL at 21:32

## 2020-10-02 RX ADMIN — HEPARIN SODIUM 14000 UNITS: 1000 INJECTION, SOLUTION INTRAVENOUS; SUBCUTANEOUS at 08:15

## 2020-10-02 RX ADMIN — SODIUM CHLORIDE: 9 INJECTION, SOLUTION INTRAVENOUS at 08:17

## 2020-10-02 RX ADMIN — HEPARIN SODIUM 3000 UNITS: 1000 INJECTION, SOLUTION INTRAVENOUS; SUBCUTANEOUS at 08:59

## 2020-10-02 RX ADMIN — LIDOCAINE HYDROCHLORIDE 100 MG: 20 INJECTION, SOLUTION EPIDURAL; INFILTRATION; INTRACAUDAL; PERINEURAL at 07:44

## 2020-10-02 RX ADMIN — ROCURONIUM BROMIDE 10 MG: 10 INJECTION, SOLUTION INTRAVENOUS at 09:13

## 2020-10-02 RX ADMIN — PROPOFOL 150 MG: 10 INJECTION, EMULSION INTRAVENOUS at 07:44

## 2020-10-02 RX ADMIN — MONTELUKAST SODIUM 10 MG: 10 TABLET, COATED ORAL at 21:32

## 2020-10-02 RX ADMIN — ROCURONIUM BROMIDE 10 MG: 10 INJECTION, SOLUTION INTRAVENOUS at 09:44

## 2020-10-02 RX ADMIN — SUGAMMADEX 200 MG: 100 INJECTION, SOLUTION INTRAVENOUS at 10:01

## 2020-10-02 RX ADMIN — ROCURONIUM BROMIDE 10 MG: 10 INJECTION, SOLUTION INTRAVENOUS at 08:47

## 2020-10-02 RX ADMIN — PROPOFOL 50 MG: 10 INJECTION, EMULSION INTRAVENOUS at 07:48

## 2020-10-02 RX ADMIN — ONDANSETRON 4 MG: 2 INJECTION INTRAMUSCULAR; INTRAVENOUS at 07:55

## 2020-10-02 RX ADMIN — FENTANYL CITRATE 50 MCG: 50 INJECTION INTRAMUSCULAR; INTRAVENOUS at 07:44

## 2020-10-02 RX ADMIN — GABAPENTIN 600 MG: 300 CAPSULE ORAL at 21:38

## 2020-10-02 RX ADMIN — ROCURONIUM BROMIDE 50 MG: 10 INJECTION, SOLUTION INTRAVENOUS at 08:00

## 2020-10-02 RX ADMIN — ROCURONIUM BROMIDE 10 MG: 10 INJECTION, SOLUTION INTRAVENOUS at 08:51

## 2020-10-02 RX ADMIN — HEPARIN SODIUM 6000 UNITS: 1000 INJECTION, SOLUTION INTRAVENOUS; SUBCUTANEOUS at 09:19

## 2020-10-02 RX ADMIN — FENTANYL CITRATE 50 MCG: 50 INJECTION INTRAMUSCULAR; INTRAVENOUS at 10:06

## 2020-10-02 ASSESSMENT — PULMONARY FUNCTION TESTS
PIF_VALUE: 5
PIF_VALUE: 23
PIF_VALUE: 25
PIF_VALUE: 24
PIF_VALUE: 24
PIF_VALUE: 26
PIF_VALUE: 25
PIF_VALUE: 24
PIF_VALUE: 24
PIF_VALUE: 25
PIF_VALUE: 26
PIF_VALUE: 24
PIF_VALUE: 25
PIF_VALUE: 26
PIF_VALUE: 23
PIF_VALUE: 25
PIF_VALUE: 26
PIF_VALUE: 25
PIF_VALUE: 36
PIF_VALUE: 23
PIF_VALUE: 0
PIF_VALUE: 24
PIF_VALUE: 26
PIF_VALUE: 24
PIF_VALUE: 25
PIF_VALUE: 26
PIF_VALUE: 24
PIF_VALUE: 0
PIF_VALUE: 17
PIF_VALUE: 5
PIF_VALUE: 43
PIF_VALUE: 25
PIF_VALUE: 25
PIF_VALUE: 1
PIF_VALUE: 25
PIF_VALUE: 26
PIF_VALUE: 6
PIF_VALUE: 25
PIF_VALUE: 26
PIF_VALUE: 24
PIF_VALUE: 25
PIF_VALUE: 25
PIF_VALUE: 24
PIF_VALUE: 26
PIF_VALUE: 25
PIF_VALUE: 23
PIF_VALUE: 26
PIF_VALUE: 1
PIF_VALUE: 26
PIF_VALUE: 0
PIF_VALUE: 0
PIF_VALUE: 25
PIF_VALUE: 24
PIF_VALUE: 25
PIF_VALUE: 26
PIF_VALUE: 25
PIF_VALUE: 26
PIF_VALUE: 25
PIF_VALUE: 26
PIF_VALUE: 25
PIF_VALUE: 25
PIF_VALUE: 26
PIF_VALUE: 1
PIF_VALUE: 26
PIF_VALUE: 26
PIF_VALUE: 4
PIF_VALUE: 26
PIF_VALUE: 24
PIF_VALUE: 26
PIF_VALUE: 23
PIF_VALUE: 25
PIF_VALUE: 25
PIF_VALUE: 26
PIF_VALUE: 5
PIF_VALUE: 17
PIF_VALUE: 25
PIF_VALUE: 25
PIF_VALUE: 1
PIF_VALUE: 25
PIF_VALUE: 25
PIF_VALUE: 26
PIF_VALUE: 24
PIF_VALUE: 26
PIF_VALUE: 1
PIF_VALUE: 25
PIF_VALUE: 29
PIF_VALUE: 25
PIF_VALUE: 24
PIF_VALUE: 25
PIF_VALUE: 24
PIF_VALUE: 25
PIF_VALUE: 20
PIF_VALUE: 24
PIF_VALUE: 26
PIF_VALUE: 17
PIF_VALUE: 25
PIF_VALUE: 26
PIF_VALUE: 5
PIF_VALUE: 26
PIF_VALUE: 25
PIF_VALUE: 24
PIF_VALUE: 23
PIF_VALUE: 25
PIF_VALUE: 24
PIF_VALUE: 26
PIF_VALUE: 25
PIF_VALUE: 26
PIF_VALUE: 24
PIF_VALUE: 5
PIF_VALUE: 25
PIF_VALUE: 26
PIF_VALUE: 23
PIF_VALUE: 18
PIF_VALUE: 24
PIF_VALUE: 25
PIF_VALUE: 25
PIF_VALUE: 1
PIF_VALUE: 4
PIF_VALUE: 24
PIF_VALUE: 4
PIF_VALUE: 2
PIF_VALUE: 25
PIF_VALUE: 26
PIF_VALUE: 5
PIF_VALUE: 25
PIF_VALUE: 24
PIF_VALUE: 29
PIF_VALUE: 25
PIF_VALUE: 25
PIF_VALUE: 26
PIF_VALUE: 26
PIF_VALUE: 24
PIF_VALUE: 25
PIF_VALUE: 24
PIF_VALUE: 26

## 2020-10-02 ASSESSMENT — PAIN SCALES - GENERAL
PAINLEVEL_OUTOF10: 3

## 2020-10-02 ASSESSMENT — PAIN DESCRIPTION - LOCATION: LOCATION: NECK;SHOULDER

## 2020-10-02 ASSESSMENT — PAIN DESCRIPTION - PAIN TYPE: TYPE: CHRONIC PAIN

## 2020-10-02 NOTE — ANESTHESIA PRE PROCEDURE
Department of Anesthesiology  Preprocedure Note       Name:  Kevyn Segovia   Age:  61 y.o.  :  1960                                          MRN:  [id number]         Date:  10/2/2020      Surgeon: * No surgeons listed *    Procedure: * No procedures listed *    Medications prior to admission:   Prior to Admission medications    Medication Sig Start Date End Date Taking? Authorizing Provider   DULoxetine (CYMBALTA) 60 MG extended release capsule Take 1 capsule by mouth daily 20  Yes Reema Tenorio MD   montelukast (SINGULAIR) 10 MG tablet Take 1 tablet by mouth nightly 20  Yes JOSE Arreguin CNP   metoprolol succinate (TOPROL XL) 25 MG extended release tablet Take 1 tablet by mouth 2 times daily 20  Yes JOSE Arreguin CNP   levothyroxine (SYNTHROID) 112 MCG tablet Take 1 tablet by mouth daily 20  Yes JOSE Arreguin CNP   colestipol (COLESTID) 1 g tablet Take 1 tablet by mouth 2 times daily 20  Yes JOSE Arreguin CNP   vitamin D (ERGOCALCIFEROL) 1.25 MG (46423 UT) CAPS capsule Take 1 capsule by mouth every 14 days 20  Yes Reema Tenorio MD   gabapentin (NEURONTIN) 300 MG capsule Take 2 capsules by mouth 3 times daily for 90 days. Patient taking differently: Take 600 mg by mouth 2 times daily.   20 Yes Reema Tenorio MD   flecainide (TAMBOCOR) 50 MG tablet Take 1 tablet by mouth 2 times daily 20  Yes Merline Bhatt MD   rivaroxaban (XARELTO) 20 MG TABS tablet Take 1 tablet by mouth Daily with supper 20  Yes Merline Bhatt MD   cetirizine (ZYRTEC) 10 MG tablet Take 10 mg by mouth daily   Yes Historical Provider, MD   budesonide-formoterol (SYMBICORT) 160-4.5 MCG/ACT AERO Inhale 2 puffs into the lungs 2 times daily 4/3/20  Yes Sree Cuevas MD   acetaminophen (TYLENOL 8 HOUR) 650 MG extended release tablet Take 650 mg by mouth every 8 hours as needed for Pain   Yes Historical Provider, MD   albuterol sulfate  (90 Base) MCG/ACT inhaler Inhale 2 puffs into the lungs 4 times daily 4/3/20 8/25/20  JOSE Dickerson NP       Current medications:    No current facility-administered medications for this encounter. Allergies:     Allergies   Allergen Reactions    Latex Rash    Statins Nausea Only       Problem List:    Patient Active Problem List   Diagnosis Code    Hypertension I10    Hyperlipidemia E78.5    Back pain M54.9    Sleep apnea G47.30    Urinary incontinence R32    Class 3 severe obesity due to excess calories without serious comorbidity with body mass index (BMI) of 45.0 to 49.9 in adult ([facility]) E66.01, Z68.42    Peripheral polyneuropathy G62.9    Neurogenic claudication due to lumbar spinal stenosis M48.062    Osteoarthritis of spine with radiculopathy, lumbar region M47.26    Balance problem R26.89    Skin lesion of left leg L98.9    Intractable chronic migraine without aura and with status migrainosus G43.711    Prediabetes R73.03    Depression F32.9    Asthma J45.909    Fibromyalgia M79.7    PAF (paroxysmal atrial fibrillation) ([facility]) I48.0       Past Medical History:        Diagnosis Date    A-fib ([facility])     Anxiety     Asthma     Back pain     Depression     Fibromyalgia     Hyperlipidemia     Hypertension     Migraines     Prediabetes     Unspecified sleep apnea     Urinary incontinence        Past Surgical History:        Procedure Laterality Date    BREAST ENHANCEMENT SURGERY      COLONOSCOPY N/A 11/27/2019    COLONOSCOPY POLYPECTOMY SNARE/COLD BIOPSY performed by Kobe Elaine MD at [address] [place] [address] Left 2017    TONSILLECTOMY  1968    TUBAL LIGATION         Social History:    Social History     Tobacco Use    Smoking status: Never Smoker    Smokeless tobacco: Never Used   Substance Use Topics    Alcohol use: Yes     Comment: 2 drinks per month Counseling given: Not Answered      Vital Signs (Current):   Vitals:    10/02/20 0645   BP: (!) 146/95   Pulse: 87   Resp: 16   Temp: 97.8 °F (36.6 °C)   Weight: 266 lb (120.7 kg)   Height: 5' 4\" (1.626 m)                                              BP Readings from Last 3 Encounters:   10/02/20 (!) 146/95   08/11/20 (!) 138/94   07/31/20 118/80       NPO Status:                                                                                 BMI:   Wt Readings from Last 3 Encounters:   10/02/20 266 lb (120.7 kg)   08/11/20 266 lb 6.4 oz (120.8 kg)   07/31/20 264 lb (119.7 kg)     Body mass index is 45.66 kg/m². CBC:   Lab Results   Component Value Date    WBC 5.9 06/02/2020    RBC 4.44 06/02/2020    HGB 13.3 06/02/2020    HCT 39.6 06/02/2020    MCV 89.3 06/02/2020    RDW 14.8 06/02/2020     06/02/2020       CMP:   Lab Results   Component Value Date     06/02/2020    K 4.6 06/02/2020     06/02/2020    CO2 24 06/02/2020    BUN 15 06/02/2020    CREATININE 0.8 06/02/2020    GFRAA >60 06/02/2020    GFRAA >60 03/07/2013    AGRATIO 1.8 06/02/2020    LABGLOM >60 06/02/2020    GLUCOSE 128 06/02/2020    PROT 6.4 06/02/2020    PROT 6.8 03/07/2013    CALCIUM 10.1 06/02/2020    BILITOT <0.2 06/02/2020    ALKPHOS 90 06/02/2020    AST <5 06/02/2020    ALT <5 06/02/2020       POC Tests: No results for input(s): POCGLU, POCNA, POCK, POCCL, POCBUN, POCHEMO, POCHCT in the last 72 hours.     Coags: No results found for: PROTIME, INR, APTT    HCG (If Applicable):   Lab Results   Component Value Date    PREGTESTUR Neg 07/15/2012        ABGs: No results found for: PHART, PO2ART, JVR7OYK, RDM7VDB, BEART, A8ETQMYM     Type & Screen (If Applicable):  No results found for: LABABO, LABRH    Drug/Infectious Status (If Applicable):  No results found for: HIV, HEPCAB    COVID-19 Screening (If Applicable):   Lab Results   Component Value Date    COVID19 NOT DETECTED 09/28/2020         Anesthesia Evaluation    Airway: Mallampati: II  TM distance: >3 FB   Neck ROM: full  Mouth opening: > = 3 FB Dental:          Pulmonary:   (+) sleep apnea:  asthma:                            Cardiovascular:    (+) hypertension:, dysrhythmias:,         Rhythm: regular  Rate: normal                    Neuro/Psych:   (+) neuromuscular disease:, headaches:, psychiatric history:            GI/Hepatic/Renal:             Endo/Other:                     Abdominal:           Vascular:                                        Anesthesia Plan      general     ASA 3       Induction: intravenous. Anesthetic plan and risks discussed with patient. Plan discussed with CRNA.                   Milly Ellison MD   10/2/2020

## 2020-10-02 NOTE — PROCEDURES
[address]     Electrophysiology Procedure Note       Date of Procedure: 10/2/2020  Patient's Name: Scarlett Mitchell  YOB: 1960   Medical Record Number: [id number]  Referring Physician: JOSE Wilson CNP  Procedure Performed by: Socorro Sanderson MD    Procedure performed:     · Electrophysiology study with left atrial recording and mapping   · 3-D electroanatomical mapping of the left atrium and  right atium. · Electrophysiological study(EPS) and induction after IV drug infusion  · Transseptal puncture through an intact septum . · Intracardiac echocardiography. · Radiofrequency ablation of atrial fibrillation and pulmonary veins isolation  · LV pacing and sensing     · Ablation of typical flutter as a separate mechanism  ·   · Deployment of closure device on all four access sites. Indications for procedure:   Symptomatic atrial fibrillation, failed antiarrhythmic therapy and cardioversion in the past   Scarlett Mitchell is a 61 y.o. female   Due to failure of antiarrhythmic therapy in the past, patient has been scheduled to have ablation for symptomatic atrial fibrillation. Details of Procedure: The risks, benefits and alternatives of the ablation procedure were discussed with the patient. The risks including, but not limited to, the risks of bleeding, infection, radiation exposure, injury to vascular, cardiac and surrounding structures (including pneumothorax), stroke, cardiac perforation, tamponade, need for emergent open heart surgery, need for pacemaker implantation, esophageal injury and fistula, myocardial infarction and death were discussed in detail. The patient opted to proceed with the ablation. Written informed consent was signed and placed in the chart. Patient was brought to the EP lab in a fasting non-sedate state.  Patient underwent general anesthesia by anesthesia team. We initially performed a transesophageal echo that showed no left atrial appendage clot/thrombus. Procedure was done without use of fluoroscopy. except for positioning of Circa catheter. Both groins were prepped in a sterile fashion. We gained access in Right femoral vein. A 9-Swedish sheath for ICE and 6F sheath for CS catheter and an Sr0 sheath for ablation and mapping catheters were  placed in the right femoral vein using modified seldinger technique. Ultrasound was used for femoral venous access. We gained access   modified Seldinger technique and ultrasound guidance. The femoral vein was identified with real time visualization of needle passage to the venous lumen. Patient was in atrial flutter with CL of 260 msec. Ablation of isthmus was done under ICE and 3-D. Radiofrequency lesions were delivered in a linear fashion to Cavotricuspid isthmus until flutter terminated. While maintaining pacing from the proximal coronary sinus, additional lesions were delivered to the isthmus until bidirectional block was observed. Bidirectional block was confirmed by noting split potentials along the ablation line (> 100 ms) in addition to trans-isthmus conduction time of >190 ms. Differential pacing from medial and lateral side of the line also confirmed bidirectional block. Using 3-D mapping system Carto the CS cathter was placed inside the coronary sinus  for recording and mapping of the left atrium. Using ICE we delineated the left pulmonary vein, left atrial appendage,  mitral valve, and right superior and right inferior pulmonary veins. Patient received a bolus of heparin prior transseptal puncture followed by continuous monitoring of the ACT and boluses of heparin during the procedure to keep the ACT more than 350. Also an esophageal temperature probe Circa was advanced into the esophagus for mapping of the esophagus and careful monitoring of the esophageal temperature during ablation.       A transseptal puncture through intact septum was done using ICE and  pressure monitoring . We placed a SR0 Sheaths inside the left atrium. Then a Circular catheter with deflectable curve and an irrigated ablation catheter was advanced into the left atrium sequentially and alternatively as needed. Using Penta ray   cathter and   [employer] Cloud navigation system a three dimensional electro anatomical mapping of the left atrium, in addition to right and left sided pulmonary vein was created. Using Penta ray catheter voltage mapping of the left atrium was created. Also an esophageal temperature probe Circa was advanced into the esophagus for mapping of the esophagus and careful monitoring of the esophageal temperature during ablation. The ICE was used to monitor location of temperature probe and move it close to ablation area. We stopped ablation when  temperature  increased 1 degree. Then wide area circumferential ablation for right and left sided pulmonary veins were performed. Linear RF lesions was placed around both superior and inferior pulmonary vein in right and left side well outside the pulmonary vein ostium till all four pulmonary veins were isolated. The power was limited to 28  W on the posterior wall and careful monitoring of esophageal temperature was done to prevent injury to esophagus and lesions near esophagus were given only for 10 seconds. Elsewhere power was 36 W as needed. (posterior)-500(anterior)      Burst pacing and programmed stimulation with up to three premature atrial stimuli did not induce any sustained arrhythmia. Both entrance and exit block was confirmed using Pentaarray catheter and pacing maneuvers after 30 minutes waiting time. Following confirmation of pulmonary veins isolation by circular catheter, isuprel IV was started and increased to 10 mcg to check for pulmonary veins reconnection and induction of any arrhythmia.  No arrhythmia was induced      Pacing from LV apex, 1:1 retrograde conduction over AV node (VA wenckebach) was 450 msec  Pacing from LV apex, showed VERP of 500/280 msec. After ablation we removed the catheters and sheaths from left atrium and performed EP study and programmed stimulation using our CS catheter and ablation cathter to assess for other arrhythmias. The deca polar/ablation catheter were moved from CS to right atrium, RV apex, and His bundle position. His bundle potentials was recorded and pacing was performed from right atrium, coronary sinus and RV apex with the following results:     AH interval was 102 msec  HV interval was 42 msec  Pacing from atrium, using CS catheter which was moved, showed 1:1 conduction over AV node with (AV wenckebach) was 380 msec  Pacing from atrium, AV dom ERP was 600/300 msec , /310     atrial flutter  msec         At the end of the procedure, using ICE we confirmed the lack of any pericardial effusion. Since patient was on anticoagulation with heparin with high ACT, we used Perclose device for closure of access sites on the right and left femoral vein. The patient tolerated the procedure well and there were no complications. Patient was extubated and transferred to the floor in stable condition. Blood loss <22 cc  No complication  Conclusion:     - Pulmonary vein isolations using wide area circumferential radiofrequency ablation   ·  - Additional ablation of  CTI dependent flutter,       Plan:   The patient will be admitted overnight to CVU. He will receive usual post ablation care.      Marisol Gastelum MD,   [facility]   Office: [phone]

## 2020-10-02 NOTE — DISCHARGE SUMMARY
EP Discharge Summary  [facility]    Patient :Kolton Vega  Room/Bed: [facility]4254/8660-41  Medical Record: [id number]  YOB: 1960    Admit date: 10/2/2020  Discharge date: 10/03/20     Admitting Physician: Sushil Mujica MD   Discharge NP: Paulie Waters CNP    Admission Diagnoses: Paroxysmal atrial fibrillation [I48.0]  Obstructive sleep apnea (adult) (pediatric) [G47.33]  [facility] [I48.0]  Discharge Diagnoses: PAF    Discharged Condition: good    Hospital Course: The patient is a 61 y.o. female with a past medical history of HTN, HLD, TONY, and atrial fibrillation. Interval HX: Patient presented for elective cardiac ablation. S/p RFCA with PVI, CTI dependent flutter ablation yesterday with Dr. Mesfin Gamboa. Uneventful post operative course. Right groin ablation site soft, no hematoma/ooozing/swelling noted. Up in chair, ambulating in room without issue. Patient seen and examined. Notes, labs, and recent testing reviewed. Telemetry reviewed, pt in NSR  No new complaints today and no major events overnight. Denies having chest pain, palpitations, shortness of breath, edema or dizziness at the time of this visit. Consults: none    Objective:   /85   Pulse 65   Temp 98.2 °F (36.8 °C) (Oral)   Resp 18   Ht 5' 4\" (1.626 m)   Wt 276 lb 10.8 oz (125.5 kg)   SpO2 99%   BMI 47.49 kg/m²      Intake/Output Summary (Last 24 hours) at 10/3/2020 0838  Last data filed at 10/2/2020 1013  Gross per 24 hour   Intake 1000 ml   Output 25 ml   Net 975 ml       Physical Exam:  Telemetry: NSR  General: Alert & Oriented x4 in no distress  Skin: Warm and dry, no cyanosis or bruising  Neck: JVD <8, no bruit  Respiratory: Respirations symmetric and unlabored. Lungs clear to auscultation bilaterally, no wheezing, rhonchi, or crackles  Cardiovascular: Regular rate and rhythm. S1/S2 present without murmurs, rubs, or gallops.    Abdomen: Soft, nontender, +bowel sounds  Extremities: No edema. Right groin ablation site soft, no hematoma/swelling/oozing noted. Significant Diagnostic Studies:   NATALIA: 10/2/20  Overall left ventricular function is normal.   The left atrial size is normal.   There is no evidence of mass or thrombus in the left atrium or appendage. Labs  CBC:   Lab Results   Component Value Date    WBC 4.3 10/02/2020    HGB 13.0 10/02/2020    HCT 39.2 10/02/2020    MCV 86.3 10/02/2020     10/02/2020     BMP:   Lab Results   Component Value Date     10/02/2020    K 4.4 10/02/2020     10/02/2020    CO2 25 10/02/2020    PHOS 3.2 08/03/2016    BUN 13 10/02/2020    CREATININE 0.7 10/02/2020    GFRAA >60 10/02/2020    GFRAA >60 03/07/2013    MG 2.40 10/02/2020      Estimated Creatinine Clearance: 112 mL/min (based on SCr of 0.7 mg/dL).      Thyroid:   Lab Results   Component Value Date    TSH 0.35 06/02/2020     Lipids:  Lab Results   Component Value Date    CHOL 162 10/20/2015    HDL 65 10/16/2019    HDL 56 06/14/2011    TRIG 112 10/20/2015     LFTS:   Lab Results   Component Value Date    ALT 21 10/02/2020    AST 16 10/02/2020    ALKPHOS 103 10/02/2020    PROT 6.7 10/02/2020    PROT 6.8 03/07/2013    AGRATIO 1.8 10/02/2020    BILITOT 0.4 10/02/2020     Cardiac Enzymes:   Lab Results   Component Value Date    CKTOTAL 125 10/23/2019     Coags:   Lab Results   Component Value Date    PROTIME 15.6 10/02/2020    INR 1.34 10/02/2020       Disposition: home    Home Medications:   Josephwilfrido Irineostephan   Home Medication Instructions CEB:[id number]    Printed on:10/03/20 0797   Medication Information                      acetaminophen (TYLENOL 8 HOUR) 650 MG extended release tablet  Take 650 mg by mouth every 8 hours as needed for Pain             albuterol sulfate  (90 Base) MCG/ACT inhaler  Inhale 2 puffs into the lungs 4 times daily             budesonide-formoterol (SYMBICORT) 160-4.5 MCG/ACT AERO  Inhale 2 puffs into the lungs 2 times daily cetirizine (ZYRTEC) 10 MG tablet  Take 10 mg by mouth daily             colestipol (COLESTID) 1 g tablet  Take 1 tablet by mouth 2 times daily             DULoxetine (CYMBALTA) 60 MG extended release capsule  Take 1 capsule by mouth daily             gabapentin (NEURONTIN) 300 MG capsule  Take 2 capsules by mouth 3 times daily for 90 days. levothyroxine (SYNTHROID) 112 MCG tablet  Take 1 tablet by mouth daily             metoprolol succinate (TOPROL XL) 25 MG extended release tablet  Take 1 tablet by mouth 2 times daily             montelukast (SINGULAIR) 10 MG tablet  Take 1 tablet by mouth nightly             pantoprazole (PROTONIX) 40 MG tablet  Take 1 tablet by mouth every morning (before breakfast)             rivaroxaban (XARELTO) 20 MG TABS tablet  Take 1 tablet by mouth Daily with supper             vitamin D (ERGOCALCIFEROL) 1.25 MG (20358 UT) CAPS capsule  Take 1 capsule by mouth every 14 days                 Problem List:  Patient Active Problem List   Diagnosis    Hypertension    Hyperlipidemia    Back pain    Sleep apnea    Urinary incontinence    Class 3 severe obesity due to excess calories without serious comorbidity with body mass index (BMI) of 45.0 to 49.9 in adult [facility])    Peripheral polyneuropathy    Neurogenic claudication due to lumbar spinal stenosis    Osteoarthritis of spine with radiculopathy, lumbar region    Balance problem    Skin lesion of left leg    Intractable chronic migraine without aura and with status migrainosus    Prediabetes    Depression    Asthma    Fibromyalgia    PAF (paroxysmal atrial fibrillation) ([facility])        Assessment & Plan:    1.  Paroxysmal Atrial Fibrillation  - S/p RFCA with PVI, CTI dependent flutter ablation (10/2/20)  - Right groin stable, no hematoma/swelling/oozing  - Will start PPI daily x1 month s/p ablation  - Educated on post ablation discharge instructions/restrictions, pt VU    - Currently in NSR  - Continue Toprol XL 25 mg BID   ~ Flecainide stopped post ablation    - [id number] score 1 (HTN) ; [id number] Vasc score and anticoagulation discussed. High risk for stroke and thromboembolism. Anticoagulation is recommended. Risk of bleeding was discussed.  ~ On Xarelto 20 mg QD; tolerating well    - Afib risk factors including age, HTN, obesity, inactivity and TONY were discussed with patient. Risk factor modification recommended   ~ TSH 0.35 (6/20)       - Treatment options including cardioversion, rate control strategy, antiarrhythmics, anticoagulation and possible ablation were discussed with patient. Risks, benefits and alternative of each treatment options were explained. All questions answered    2. HTN  - Controlled: Goal <130/80  - Continue current medications  - Encouraged to monitor and log BP readings at home, then bring log to next visit  - Discussed importance of low sodium diet, weight control and exercise    3. TONY  - Stable: Uses CPAP  - Encourage to use CPAP to prevent long term effects of untreated TONY    4. Obesity  - Body mass index is 47.49 kg/m². - Complicating assessment and treatment. Placing patient at risk for multiple co-morbidities as well as early death and contributing to the patient's presentation  - Discussed weight loss and patient was encouraged to reduce calorie intake and increase exercise    Overall the patient is stable for discharge from a CV standpoint    Diet & Exercise:   The patient is counseled to follow a low salt diet to assure blood pressure remains controlled for cardiovascular risk factor modification   The patient is counseled to avoid excess caffeine, and energy drinks as this may exacerbated ectopy and arrhythmia   The patient is counseled to lose weight to control cardiovascular risk factors   Exercise program discussed:  To improve overall cardiovascular health, the patient is instructed to increase cardiovascular related activities with a goal of 150 min/week of moderate level activity or 10,000 steps per day.  Encouraged to perform as much activity as tolerated    EF of 83%  ACEi for systolic HF: N/A  ASA and Statin for CAD: N/A  Anticoagulation for AF and heart failure: Yes (Xarelto)  Tobacco use was discussed with the patient and education provided: N/A  Documentation sent to PCP: Note sent to PCP office    Activity: See discharge instructions  Diet: cardiac diet  Wound Care: See discharge instructions    F/U:   1. Follow-up with EP NP in 4-6 weeks  -Call [place] [address] at [phone] with any questions    Signed:  Declan Sanchez CNP  10/3/2020  8:38 AM    Total time spent on day of discharge including face-to-face visit, examination, documentation, counseling, preparation of discharge plans and follow-up, and discharge medicine reconciliation and prescriptions is >31 minutes

## 2020-10-02 NOTE — H&P
[facility]   Electrophysiology     Reason for Consultation: Afib  Consult Requesting Physician: JOSE Beauchamp CNP      Chief Complaint   Patient presents with    New Patient     Abnormal EKG / Event Monitor        HPI: Kolton Vega is a 61 y.o. female with a PMH of PAF, anxiety, asthma, HLD, depression, slep apnea and HTN. She went to urgent care and her ecg showed atrial fibrillation. A monitor was ordered which revealed afib RVR 25% burden    Cameron Schuler presents to the office as a new patient. She has complaints of fluctuating heart rate and fatigue. Her ECG today shows SR. She does notice her episodes of afib. She states that she is trying to be active to aide in weight loss including walking and swimming. Past Medical History:   Diagnosis Date    Anxiety     Asthma     Back pain     Depression     Fibromyalgia     Hyperlipidemia     Hypertension     Migraines     Prediabetes     Unspecified sleep apnea     Urinary incontinence         Past Surgical History:   Procedure Laterality Date    BREAST ENHANCEMENT SURGERY      COLONOSCOPY N/A 11/27/2019    COLONOSCOPY POLYPECTOMY SNARE/COLD BIOPSY performed by Warren Sanchez MD at [address] Left 2017    TONSILLECTOMY  1968    TUBAL LIGATION         Allergies: Allergies   Allergen Reactions    Latex Rash    Statins Nausea Only       Social History:   reports that she has never smoked. She has never used smokeless tobacco. She reports current alcohol use. She reports that she does not use drugs. Family History:  family history includes Arthritis in her father and mother; Breast Cancer in her maternal grandmother; COPD in her brother, father, and mother; Cancer in her father, maternal uncle, mother, and other family members; Diabetes in her mother; Glaucoma in her maternal grandmother; Heart Disease in her mother; Ovarian Cancer in her mother; Stroke in her mother. Reviewed. Denies family history of sudden cardiac death, arrhythmia, premature CAD    Review of System:  All other systems reviewed and are negative except for that noted above. Pertinent negatives are:   General: negative for fever, chills   Ophthalmic ROS: negative for - eye pain or loss of vision  ENT ROS: negative for - headaches, sore throat   Respiratory: negative for - cough, sputum  Cardiovascular: Reviewed in HPI  Gastrointestinal: negative for - abdominal pain, diarrhea, N/V  Hematology: negative for - bleeding, blood clots, bruising or jaundice  Genito-Urinary:  negative for - Dysuria or incontinence  Musculoskeletal: negative for - Joint swelling, muscle pain  Neurological: negative for - confusion, dizziness, headaches   Psychiatric: No anxiety, no depression. Dermatological: negative for - rash    Physical Examination:  Vitals:    07/16/20 1542   BP: 120/81   Pulse: 71   Resp: 20      Wt Readings from Last 3 Encounters:   07/16/20 265 lb (120.2 kg)   07/07/20 264 lb 12.8 oz (120.1 kg)   06/02/20 271 lb (122.9 kg)       · Constitutional: Oriented. No distress. · Head: Normocephalic and atraumatic. · Mouth/Throat: Oropharynx is clear and moist.   · Eyes: Conjunctivae normal. EOM are normal.   · Neck: Neck supple. No rigidity. No JVD present. · Cardiovascular: Normal rate, irregular rhythm, S1&S2. · Pulmonary/Chest: Bilateral respiratory sounds. No wheezes, No rhonchi. · Abdominal: Soft. Bowel sounds present. No distension, No tenderness. · Musculoskeletal: No tenderness. No edema  Limited Rt shoulder movement  · Lymphadenopathy: Has no cervical adenopathy. · Neurological: Alert and oriented. Cranial nerve appears intact, No Gross deficit   · Skin: Skin is warm and dry. No rash noted. · Psychiatric: Has a normal behavior       Labs, diagnostic and imaging results reviewed. Reviewed.    Lab Results   Component Value Date    TSH 0.35 06/02/2020    TSH 2.90 10/23/2019    CREATININE 0.8 06/02/2020 CREATININE 0.7 10/16/2019    AST <5 06/02/2020    ALT <5 06/02/2020       ECG:  atrial flutter   CT angio 3/18/19  FINDINGS FOR CORONARY CT ANGIOGRAM  CARDIAC FINDINGS:  Pericardium:  Grossly unremarkable  Left atrium:  Grossly unremarkable  Left ventricle:  Grossly unremarkable  Right atrium:  Grossly unremarkable  Right ventricle:  Grossly unremarkable  Mitral valve:  Grossly unremarkable  Aortic valve:  Grossly unremarkable    CORONARY ARTERIES:  Dominance:  Left  Left main:  Unremarkable  LAD and diagonals:  Unremarkable  LCx and OM:  Unremarkable  RCA:  Unremarkable  There is no evidence of myocardial bridge, coronary aneurysm, or coronary artery anomaly    (The following structures were not completely included on this scan)  LUNGS/AIRWAYS:  Unremarkable  PLEURA: Unremarkable  MEDIASTINUM/JAIME:  Unremarkable  AORTA:  Minimal atherosclerotic calcifications  CHEST WALL/LOWER NECK:  Unremarkable  UPPER ABDOMEN:  Unremarkable  BONES:  Mild degenerative changes of the spine  OTHER:  None    IMPRESSION      Total calcium score is 0 which corresponds to no identifiable coronary calcification and less than 10th percentile for age and sex    Normal CT angiogram of coronary arteries    Echo: 12/28/2018  Summary:  The left ventricular wall motion is normal.  Overall left ventricular ejection fraction is estimated to be 55-60%. Right ventricular systolic pressure is indeterminate due to poorly  visualized tricuspid regurgitation.   Poor endocardial border visualization  There is borderline concentric left ventricular hypertrophy      Medication:  Current Outpatient Medications   Medication Sig Dispense Refill    cetirizine (ZYRTEC) 10 MG tablet Take 10 mg by mouth daily      rivaroxaban (XARELTO) 20 MG TABS tablet Take 1 tablet by mouth Daily with supper 30 tablet 1    metoprolol succinate (TOPROL XL) 25 MG extended release tablet Take 1 tablet by mouth 2 times daily 180 tablet 0    levothyroxine (SYNTHROID) 112 MCG tablet Take 1 tablet by mouth daily 90 tablet 0    colestipol (COLESTID) 1 g tablet Take 1 tablet by mouth 2 times daily 180 tablet 0    montelukast (SINGULAIR) 10 MG tablet Take 1 tablet by mouth nightly 90 tablet 0    albuterol sulfate  (90 Base) MCG/ACT inhaler Inhale 2 puffs into the lungs 4 times daily 1 Inhaler 3    budesonide-formoterol (SYMBICORT) 160-4.5 MCG/ACT AERO Inhale 2 puffs into the lungs 2 times daily 1 Inhaler 0    DULoxetine (CYMBALTA) 60 MG extended release capsule Take 1 capsule by mouth daily 90 capsule 1    vitamin D (ERGOCALCIFEROL) 1.25 MG (53685 UT) CAPS capsule Take 1 capsule by mouth every 14 days 6 capsule 1    gabapentin (NEURONTIN) 300 MG capsule Take 2 capsules by mouth 3 times daily for 90 days. 540 capsule 0    acetaminophen (TYLENOL 8 HOUR) 650 MG extended release tablet Take 650 mg by mouth every 8 hours as needed for Pain       No current facility-administered medications for this visit. Assessment and plan:     PAF  -MCOT 6/2020 showed afib RVR with 25% burden     -Afib risk factors including age, HTN, obesity, inactivity and TONY were discussed with patient. Risk factor modification recommended. All questions were answered.  -Her blood pressure is well controlled, we encouraged her to work on risk factor modification including weight loss. - [id number] Vasc score and anticoagulation discussed. High risk for stroke and thromboembolism. Anticoagulation is recommended.   -I discussed anticoagulation to decrease the risk of thromboembolic events including stroke. Benefits and alternatives were discussed with patient. Risk of bleeding was discussed. Patient verbalized understanding.   -She is on Xarelto 20 mg daily  -Toprol XL 25 mg BID  -We discussed treatment options including antiarrhythmics, rate control with anticoagulation, and ablation.   -We discussed progressive nature of atrial fibrillation.  Treatment success decreases when AF becomes persistent and last more than 6 months.   -Antiarrhythmic therapy, side effects, benefits and alternative discussed.    -Atrial fibrillation ablation procedure was discussed. We discussed the need for repeat procedure. On average patients may need more than one ablation procedure.    -Risks associated with ablation include but not limited to allergic reaction to the medications, pain, bleeding, infection, nerve injury, injury to diaphragm(breathing muscle), pulmonary embolus(blood clot in lungs), deep vein blood clot, pneumothorax, hemothorax, acute renal failure, cardiac perforation,  tamponade, need for emergent surgery (open heart), permanent pacemaker, pulmonary vein stenosis, left atrial to esophageal fistula, stroke, myocardial infarction and death. Difference between atrial fibrillation and atrial flutter discussed and treatment discussed.   -Flecainide 50 mg BID until her ablation  -We will schedule her for a cryo ablation of afib    HTN  Vitals:    07/16/20 1542   BP: 120/81   Pulse: 71   Resp: 20   -Home BP monitoring encourage with a BP goal <130/80    Obesity  Body mass index is 45.49 kg/m². - Excessive weight is complicating assessment and treatment. It is placing patient at risk for multiple co-morbidities as well as early death and contributing to the patient's presentation. - discussed weight management with diet and exercise   -She tries to stay active with walking and water aerobics    Sleep apnea  -She does not use a cpap at this time but encouraged her to use this.  Working on a new mask      She may stop her Xarelto for her shoulder replacement    Plan-   afib/flutter ablation

## 2020-10-02 NOTE — ANESTHESIA POSTPROCEDURE EVALUATION
Department of Anesthesiology  Postprocedure Note    Patient: Scarlett Mitchell  MRN: [id number]  YOB: 1960  Date of evaluation: 10/2/2020  Time:  12:02 PM     Procedure Summary     Date:  10/02/20 Room / Location:  [facility] Cath Lab; [facility] Echocardi[facility]    Anesthesia Start:  1327 Anesthesia Stop:  1020    Procedures:       ECHO NATALIA IN CARDIAC CATH      Procedure Not Yet Scheduled Diagnosis:       Paroxysmal atrial fibrillation      Obstructive sleep apnea (adult) (pediatric)    Scheduled Providers:   Responsible Provider:  Cj Mims MD    Anesthesia Type:  general ASA Status:  3          Anesthesia Type: general    Shahid Phase I: Shahid Score: 9    Shahid Phase II:      Last vitals: Reviewed and per EMR flowsheets.        Anesthesia Post Evaluation    Level of consciousness: awake  Complications: no

## 2020-10-02 NOTE — PROGRESS NOTES
Patient/ report received from Cath lab in stable condition, right groin drsg with small amount of bloody drainage noted, right groin soft, no hematoma noted, will monitor

## 2020-10-03 VITALS
SYSTOLIC BLOOD PRESSURE: 122 MMHG | RESPIRATION RATE: 16 BRPM | HEIGHT: 64 IN | WEIGHT: 276.68 LBS | HEART RATE: 65 BPM | BODY MASS INDEX: 47.24 KG/M2 | DIASTOLIC BLOOD PRESSURE: 85 MMHG | OXYGEN SATURATION: 97 % | TEMPERATURE: 98.2 F

## 2020-10-03 PROCEDURE — 2580000003 HC RX 258: Performed by: INTERNAL MEDICINE

## 2020-10-03 PROCEDURE — 6370000000 HC RX 637 (ALT 250 FOR IP): Performed by: INTERNAL MEDICINE

## 2020-10-03 PROCEDURE — 94640 AIRWAY INHALATION TREATMENT: CPT

## 2020-10-03 PROCEDURE — 99217 PR OBSERVATION CARE DISCHARGE MANAGEMENT: CPT | Performed by: NURSE PRACTITIONER

## 2020-10-03 PROCEDURE — 94761 N-INVAS EAR/PLS OXIMETRY MLT: CPT

## 2020-10-03 RX ADMIN — METOPROLOL SUCCINATE 25 MG: 25 TABLET, EXTENDED RELEASE ORAL at 08:08

## 2020-10-03 RX ADMIN — GABAPENTIN 600 MG: 300 CAPSULE ORAL at 08:08

## 2020-10-03 RX ADMIN — Medication 10 ML: at 08:08

## 2020-10-03 RX ADMIN — Medication 2 PUFF: at 09:10

## 2020-10-03 RX ADMIN — DULOXETINE HYDROCHLORIDE 60 MG: 60 CAPSULE, DELAYED RELEASE ORAL at 08:08

## 2020-10-03 RX ADMIN — LEVOTHYROXINE SODIUM 112 MCG: 0.11 TABLET ORAL at 08:08

## 2020-10-03 ASSESSMENT — PAIN SCALES - GENERAL: PAINLEVEL_OUTOF10: 2

## 2020-10-03 ASSESSMENT — PAIN DESCRIPTION - LOCATION: LOCATION: NECK;GROIN

## 2020-10-03 ASSESSMENT — PAIN DESCRIPTION - PAIN TYPE: TYPE: CHRONIC PAIN

## 2020-10-03 NOTE — PROGRESS NOTES
Discharge order received, pt verbalized agreement to discharge, disposition to previous residence, no needs for HHC/DME. Discharge instructions prepared and given to patient, pt verbalized understanding. Medication information packet given r/t NEW and/or CHANGED prescriptions emphasizing name/purpose/side effects, pt verbalized understanding. Discharge instruction summary: Diet- Cardiac, Activity- as tolerated, Primary Care Physician as follows: JOSE Dunne - [phone] f/u appointment within 1 week, immunizations reviewed and N/A, prescription medications filled [employer]. Inpatient surgical procedure precautions reviewed: N/A     Pt belongings gathered, IV removed. Disposition is home (no HHC/DME needs), transported with , taken to lobby via w/c w/ all personal belongings, no complications.

## 2020-10-03 NOTE — PROGRESS NOTES
Pt admitted to 3A from cath lab. A&o x4. resp even/unlabored on ra, no sob. No cp/pressure, tele reading NSR 80's. Rt groin cath site soft, old blood on destat, no active bleeding/hematoma. 2+ pedal pulse.  Pt on bedrest. Bed low, call bell in reach, instructed to call for assist.

## 2020-10-03 NOTE — PLAN OF CARE
Plan of care reviewed. Reports no questions. Pt remains free from falls. Bed alarm engaged. Call light in reach. Orange blanket tied to bed.  Fall risk armband on pt wrist.

## 2020-10-03 NOTE — PLAN OF CARE
Problem: Pain:  Goal: Pain level will decrease  Description: Pain level will decrease  Outcome: Ongoing     Problem: Falls - Risk of:  Goal: Will remain free from falls  Description: Will remain free from falls  10/3/2020 0926 by Edu Oquendo RN  Outcome: Ongoing

## 2020-10-05 ENCOUNTER — TELEPHONE (OUTPATIENT)
Dept: INTERNAL MEDICINE CLINIC | Age: 60
End: 2020-10-05

## 2020-10-05 NOTE — TELEPHONE ENCOUNTER
[facility] Initial Follow Up Call    Outreach made within 2 business days of discharge: Yes    Patient: Kuldip Hyde Patient : 1960   MRN: [id number]  Reason for Admission: There are no discharge diagnoses documented for the most recent discharge.   Discharge Date: 10/3/20       Spoke with: left voicemail    Discharge department/facility: [facility]    Scheduled appointment with PCP within 7-14 days    Follow Up  Future Appointments   Date Time Provider Karen Bradley   10/6/2020 10:00 AM Alka Hof, PT [facility] [place]    10/8/2020  9:00 AM Alka Hof, PT [facility]    10/13/2020  9:15 AM Alka Hof, PT [facility] [place]    10/15/2020  9:00 AM Alka Hof, PT [facility]    10/19/2020 10:00 AM Fredide Hopkins, PhD [facility]   10/20/2020  9:15 AM Laka Hof, PT [facility] [place]    10/22/2020  9:00 AM Alka Hof, PT [facility] [place]    2020  9:00 AM JOSE Evangelista CNP [facility] Cardio [facility]   2020  2:20 PM JOSE Askew CNP [facility]   3/16/2021  9:15 AM Starr Baker MD FF RHEUM [facility]       Aries Araujo MA

## 2020-10-06 ENCOUNTER — HOSPITAL ENCOUNTER (OUTPATIENT)
Dept: PHYSICAL THERAPY | Age: 60
Setting detail: THERAPIES SERIES
Discharge: HOME | End: 2020-10-06
Payer: MEDICARE

## 2020-10-06 PROCEDURE — 97110 THERAPEUTIC EXERCISES: CPT

## 2020-10-06 NOTE — FLOWSHEET NOTE
Isometrics:  Add/abd, ER/IR with Pillow     10    Wall slides (towel)   10 Added 10/6   (Seated) scaption   2 10 Added 10/6 emphasis to reduce upper trap compensation           Therapeutic Activities (05949)                                          Neuromuscular Re-ed (79821)                                                 Manual Intervention (99339)       PROM all planes  5 mins                                            Pt. Education:  -patient educated on diagnosis, prognosis and expectations for rehab  -all patient questions were answered    Home Exercise Program:   Access Code: [email or web address]   URL: [employer]/   Date: 09/08/2020   Prepared by: Tresa Torres     Exercises   Seated Shoulder Flexion Slide at Table Top with Forearm in Neutral - 10 reps - 3 sets - 3 hold - 1x daily - 7x weekly         Therapeutic Exercise and NMR EXR  [] (43600) Provided verbal/tactile cueing for activities related to strengthening, flexibility, endurance, ROM for improvements in  [] LE / Lumbar: LE, proximal hip, and core control with self care, mobility, lifting, ambulation. [] UE / Cervical: cervical, postural, scapular, scapulothoracic and UE control with self care, reaching, carrying, lifting, house/yardwork, driving, computer work.  [] (07316) Provided verbal/tactile cueing for activities related to improving balance, coordination, kinesthetic sense, posture, motor skill, proprioception to assist with   [] LE / lumbar: LE, proximal hip, and core control in self care, mobility, lifting, ambulation and eccentric single leg control.    [] UE / cervical: cervical, scapular, scapulothoracic and UE control with self care, reaching, carrying, lifting, house/yardwork, driving, computer work.   [] (17429) Therapist is in constant attendance of 2 or more patients providing skilled therapy interventions, but not providing any significant amount of measurable one-on-one time to either patient, for improvements in  [] LE / lumbar: LE, proximal hip, and core control in self care, mobility, lifting, ambulation and eccentric single leg control. [] UE / cervical: cervical, scapular, scapulothoracic and UE control with self care, reaching, carrying, lifting, house/yardwork, driving, computer work. NMR and Therapeutic Activities:    [] (21617 or 99439) Provided verbal/tactile cueing for activities related to improving balance, coordination, kinesthetic sense, posture, motor skill, proprioception and motor activation to allow for proper function of   [] LE: / Lumbar core, proximal hip and LE with self care and ADLs  [] UE / Cervical: cervical, postural, scapular, scapulothoracic and UE control with self care, carrying, lifting, driving, computer work.   [] (41271) Gait Re-education- Provided training and instruction to the patient for proper LE, core and proximal hip recruitment and positioning and eccentric body weight control with ambulation re-education including up and down stairs     Home Management Training / Self Care:  [] (36578) Provided self-care/home management training related to activities of daily living and compensatory training, and/or use of adaptive equipment for improvement with: ADLs and compensatory training, meal preparation, safety procedures and instruction in use of adaptive equipment, including bathing, grooming, dressing, personal hygiene, basic household cleaning and chores.      Home Exercise Program:    [x] (99274) Reviewed/Progressed HEP activities related to strengthening, flexibility, endurance, ROM of   [] LE / Lumbar: core, proximal hip and LE for functional self-care, mobility, lifting and ambulation/stair navigation   [] UE / Cervical: cervical, postural, scapular, scapulothoracic and UE control with self care, reaching, carrying, lifting, house/yardwork, driving, computer work  [] (92780)Reviewed/Progressed HEP activities related to improving balance, coordination, kinesthetic sense, posture, motor skill, proprioception of   [] LE: core, proximal hip and LE for self care, mobility, lifting, and ambulation/stair navigation    [] UE / Cervical: cervical, postural,  scapular, scapulothoracic and UE control with self care, reaching, carrying, lifting, house/yardwork, driving, computer work    Manual Treatments:  PROM / STM / Oscillations-Mobs:  G-I, II, III, IV (PA's, Inf., Post.)  [x] (21430) Provided manual therapy to mobilize LE, proximal hip and/or LS spine soft tissue/joints for the purpose of modulating pain, promoting relaxation,  increasing ROM, reducing/eliminating soft tissue swelling/inflammation/restriction, improving soft tissue extensibility and allowing for proper ROM for normal function with   [] LE / lumbar: self care, mobility, lifting and ambulation. [] UE / Cervical: self care, reaching, carrying, lifting, house/yardwork, driving, computer work. Modalities:  [] (80312) Vasopneumatic compression: Utilized vasopneumatic compression to decrease edema / swelling for the purpose of improving mobility and quad tone / recruitment which will allow for increased overall function including but not limited to self-care, transfers, ambulation, and ascending / descending stairs. Modalities: 9/8, 9/10, 9/15, 9/23, 10/6 CP to Right shoulder supine with bolster underneath BLEs    Charges:  Timed Code Treatment Minutes: 41   Total Treatment Minutes: 51     [] EVAL - LOW (04077)   [] EVAL - MOD (07884)  [] EVAL - HIGH (53753)  [] RE-EVAL (94903)  [x] JM(56859) x  3     [] Ionto  [] NMR (43643) x       [] Vaso  [] Manual (62025) x       [] Ultrasound  [] TA x        [] Mech Traction (04366)  [] Aquatic Therapy x     [] ES (un) (18951):   [] Home Management Training x  [] ES(attended) (57200)   [] Dry Needling 1-2 muscles (99631):  [] Dry Needling 3+ muscles (804271)  [] Group:      [] Other:     GOALS:  Patient stated goal: To get back to swimming   []? Progressing: []? Met: []?  Not Met: []? Adjusted     Therapist goals for Patient:   Short Term Goals: To be achieved in: 2 weeks  1. Independent in HEP and progression per patient tolerance, in order to prevent re-injury. []? Progressing: []? Met: []? Not Met: []? Adjusted  2. Patient will have a decrease in pain to facilitate improvement in movement, function, and ADLs as indicated by Functional Deficits. []? Progressing: []? Met: []? Not Met: []? Adjusted     Long Term Goals: To be achieved in: 6 weeks/ DC   1. Disability index score of 30% or less for the  Quick DASH to assist with reaching prior level of function. []? Progressing: []? Met: []? Not Met: []? Adjusted  2. Patient will demonstrate increased AROM to right shoulder by 15 deg to allow for proper joint functioning as indicated by patients Functional Deficits. []? Progressing: []? Met: []? Not Met: []? Adjusted  3. Patient will demonstrate an increase in right shoulder Strength to 4/5 to allow for proper functional mobility as indicated by patients Functional Deficits. []? Progressing: []? Met: []? Not Met: []? Adjusted  4. Patient will return to functional activities including swimming without increased symptoms or restriction. []? Progressing: []? Met: []? Not Met: []? Adjusted  5. Patient to be able to perform self care /grooming activities with right shoulder w/o symptoms or restriction. []? Progressing: []? Met: []? Not Met: []? Adjusted          Overall Progression Towards Functional goals/ Treatment Progress Update:  [] Patient is progressing as expected towards functional goals listed. [] Progression is slowed due to complexities/Impairments listed. [] Progression has been slowed due to co-morbidities.   [x] Plan just implemented, too soon to assess goals progression <30days   [] Goals require adjustment due to lack of progress  [] Patient is not progressing as expected and requires additional follow up with physician  [] Other    Persisting Functional Limitations/Impairments:  [x]Sleeping []Sitting               []Standing []Transfers        []Walking []Kneeling               []Stairs []Squatting / bending   []ADLs []Reaching  []Lifting  []Housework  []Driving []Job related tasks  []Sports/Recreation [x]Other: overhead movement/ self care         ASSESSMENT:  10/6: Patient today tolerated added AROM exercises with right shoulder with reasonable difficulty . The patient required cues to decrease upper trap compensation and management of end range. Patient also point tenderness at posterior deltoid and tenderness in upper trap. The patient was advised to perform neck stretches and PT will follow with them next session. Will continue with Post surgical protocol as tolerated      9/23: Added Tricep extension and bicep brachai exercises. Patient continued isometrics , AAROM, and PROM. Patient able to show improvement passively with right shoulder Exernal rotation. Will continue per PT Total shoulder protocol . 9/16 : Patient tolerated AAROM and PROM to per protocol . Patient was able to perform TAble top AAROM with Swiss without complications . Patient today was a little guarded with PROM during manual therapy. Patient to further benefit from PT to maximize functional mobility. 9/14 Patient continued AROM exercises per shoulder protocol without any complications . Added bicep curls with blue T-band . Patient able to demonstrate increase PROM to end range per shoulder protocol in all planes. Will continue to  progress parascapula exercises as tolerated per protocol to assist AROM. Treatment/Activity Tolerance:  [] Patient able to complete tx [] Patient limited by fatigue  [] Patient limited by pain  [] Patient limited by other medical complications  [] Other:     Prognosis: [] Good [] Fair  [] Poor    Patient Requires Follow-up: [x] Yes  [] No    Plan for next treatment session:    PLAN: See eval. PT 2x / week for 6 weeks.    [x] Continue per plan of care [] Alter current plan (see comments)  [] Plan of care initiated [] Hold pending MD visit [] Discharge    Electronically signed by: Valdo Michael PT, DPT    Note: If patient does not return for scheduled/ recommended follow up visits, this note will serve as a discharge from care along with most recent update on progress.

## 2020-10-07 ENCOUNTER — TELEPHONE (OUTPATIENT)
Dept: CARDIOLOGY CLINIC | Age: 60
End: 2020-10-07

## 2020-10-07 NOTE — TELEPHONE ENCOUNTER
Called and spoke with patient states that she has been having a sharp pain in the center of her chest. She states that the pain tends to stay for about 15 minutes then subsides. She states she also feels her heart is \"pounding\" out of her chest and she is unable to do anything that she normally does. She is continuing to monitor her pulse and states that it is continuing to stay elevated. Currently her pulse is at 120 lying down. Patient was ablated on 10/02/2020.      Please advise

## 2020-10-07 NOTE — TELEPHONE ENCOUNTER
Pt calling with a rapid heart beat at 146, 153, 135 was the lowest  the last 3 days pt wants to know if this is normal after ablation on Friday 10/02/20 and it is making her feel horrible .  pls call to advise

## 2020-10-07 NOTE — TELEPHONE ENCOUNTER
Call placed to betsy. She did really well the day after she got home. Her heart rate has remained elevated. She will come in for an appointment tomorrow with [facility].  I explained if symptoms worsen she should go to the ED

## 2020-10-08 ENCOUNTER — HOSPITAL ENCOUNTER (OUTPATIENT)
Dept: PHYSICAL THERAPY | Age: 60
Setting detail: THERAPIES SERIES
Discharge: HOME | End: 2020-10-08
Payer: MEDICARE

## 2020-10-08 ENCOUNTER — OFFICE VISIT (OUTPATIENT)
Dept: CARDIOLOGY CLINIC | Age: 60
End: 2020-10-08
Payer: MEDICARE

## 2020-10-08 VITALS
HEART RATE: 41 BPM | OXYGEN SATURATION: 97 % | HEIGHT: 63 IN | DIASTOLIC BLOOD PRESSURE: 72 MMHG | BODY MASS INDEX: 47.51 KG/M2 | SYSTOLIC BLOOD PRESSURE: 126 MMHG | WEIGHT: 268.12 LBS

## 2020-10-08 PROCEDURE — 1036F TOBACCO NON-USER: CPT | Performed by: INTERNAL MEDICINE

## 2020-10-08 PROCEDURE — 97140 MANUAL THERAPY 1/> REGIONS: CPT

## 2020-10-08 PROCEDURE — 97110 THERAPEUTIC EXERCISES: CPT

## 2020-10-08 PROCEDURE — G8484 FLU IMMUNIZE NO ADMIN: HCPCS | Performed by: INTERNAL MEDICINE

## 2020-10-08 PROCEDURE — G8417 CALC BMI ABV UP PARAM F/U: HCPCS | Performed by: INTERNAL MEDICINE

## 2020-10-08 PROCEDURE — 0296T PR EXT ECG > 48HR TO 21 DAY RCRD W/CONECT INTL RCRD: CPT | Performed by: INTERNAL MEDICINE

## 2020-10-08 PROCEDURE — 93000 ELECTROCARDIOGRAM COMPLETE: CPT | Performed by: INTERNAL MEDICINE

## 2020-10-08 PROCEDURE — 99214 OFFICE O/P EST MOD 30 MIN: CPT | Performed by: INTERNAL MEDICINE

## 2020-10-08 PROCEDURE — G8427 DOCREV CUR MEDS BY ELIG CLIN: HCPCS | Performed by: INTERNAL MEDICINE

## 2020-10-08 PROCEDURE — 3017F COLORECTAL CA SCREEN DOC REV: CPT | Performed by: INTERNAL MEDICINE

## 2020-10-08 NOTE — FLOWSHEET NOTE
[address] [facility]  Phone: [phone]   Fax: [phone]    Physical Therapy Daily Treatment Note  Date:  10/8/2020    Patient Name:  Nando Nolasco    :  1960  MRN: [id number]  Medical/Treatment Diagnosis Information:  · Diagnosis: S/P Right Total shoulder sx  · Treatment Diagnosis: Right shoulder pain , decrease AROM, and strength  Insurance/Certification information:  PT Insurance Information: [employer] Child[employer] O  Physician Information:  Referring Practitioner: Marcelo Zamora MD  Plan of care signed (Y/N): []  Yes [x]  No     Date of Patient follow up with Physician: MARIELA     Progress Report: []  Yes  [x]  No     Date Range for reporting period:  Beginnin20  Ending:    Progress report due (10 Rx/or 30 days whichever is less): visit #41     Recertification due (POC duration/ or 90 days whichever is less):     Visit # Insurance Allowable Auth required? Date Range    Mn []  Yes  [x]  No Na     Latex Allergy:  [x]NO      []YES  Preferred Language for Healthcare:   [x]English       []other:    Functional Scale:        Date assessed:  Quick Dash: raw score = 31; dysfunction = 56%  20    Pain level:  0/10     SUBJECTIVE:  Week 7 10/8: Patient reports that she is having numbness/tingling in 4th & 5th fingers with pain in the hand. OBJECTIVE: 10/8: Lightheaded    : moderate antalgic gait on R foot       RESTRICTIONS/PRECAUTIONS:  Following Total Shoulder protocol ( Junior's) Avoiding IR     Exercises/Interventions:     Therapeutic Exercises (16549) Resistance / level Sets/sec Reps Notes   Pulleys to 90 deg flexion/scap 2  min      Table top : Flexion/abd , circles        Scapular retraction       Mid rows  High rows  Bicep curls-  Bicep brachai  Tricep  Lime T-band    Blue/purpleT-band 2  2  2  2  2 10x  10x  10x  10x  10x     Added    Wand exercises;   Flexion/abd performed seated to 90 det  Supine Flexion  to 120 deg  ER @ 45 related to improving balance, coordination, kinesthetic sense, posture, motor skill, proprioception of   [] LE: core, proximal hip and LE for self care, mobility, lifting, and ambulation/stair navigation    [] UE / Cervical: cervical, postural,  scapular, scapulothoracic and UE control with self care, reaching, carrying, lifting, house/yardwork, driving, computer work    Manual Treatments:  PROM / STM / Oscillations-Mobs:  G-I, II, III, IV (PA's, Inf., Post.)  [x] (03316) Provided manual therapy to mobilize LE, proximal hip and/or LS spine soft tissue/joints for the purpose of modulating pain, promoting relaxation,  increasing ROM, reducing/eliminating soft tissue swelling/inflammation/restriction, improving soft tissue extensibility and allowing for proper ROM for normal function with   [] LE / lumbar: self care, mobility, lifting and ambulation. [] UE / Cervical: self care, reaching, carrying, lifting, house/yardwork, driving, computer work. Modalities:  [] (91475) Vasopneumatic compression: Utilized vasopneumatic compression to decrease edema / swelling for the purpose of improving mobility and quad tone / recruitment which will allow for increased overall function including but not limited to self-care, transfers, ambulation, and ascending / descending stairs. Modalities: 9/8, 9/10, 9/15, 9/23, 10/6, 10/8 CP to Right shoulder supine with bolster underneath BLEs    Charges:  Timed Code Treatment Minutes:     Total Treatment Minutes:      [] EVAL - LOW (81643)   [] EVAL - MOD (67397)  [] EVAL - HIGH (44048)  [] RE-EVAL (91378)  [x] MC(33443) x  2     [] Ionto  [] NMR (06525) x       [] Vaso  [x] Manual (92666) x  1    [] Ultrasound  [] TA x        [] Mech Traction (43912)  [] Aquatic Therapy x     [] ES (un) (51632):   [] Home Management Training x  [] ES(attended) (75956)   [] Dry Needling 1-2 muscles (78119):  [] Dry Needling 3+ muscles (430980)  [] Group:      [] Other:     GOALS:  Patient stated goal: To get back to swimming   []? Progressing: []? Met: []? Not Met: []? Adjusted     Therapist goals for Patient:   Short Term Goals: To be achieved in: 2 weeks  1. Independent in HEP and progression per patient tolerance, in order to prevent re-injury. []? Progressing: []? Met: []? Not Met: []? Adjusted  2. Patient will have a decrease in pain to facilitate improvement in movement, function, and ADLs as indicated by Functional Deficits. []? Progressing: []? Met: []? Not Met: []? Adjusted     Long Term Goals: To be achieved in: 6 weeks/ DC   1. Disability index score of 30% or less for the  Quick DASH to assist with reaching prior level of function. []? Progressing: []? Met: []? Not Met: []? Adjusted  2. Patient will demonstrate increased AROM to right shoulder by 15 deg to allow for proper joint functioning as indicated by patients Functional Deficits. []? Progressing: []? Met: []? Not Met: []? Adjusted  3. Patient will demonstrate an increase in right shoulder Strength to 4/5 to allow for proper functional mobility as indicated by patients Functional Deficits. []? Progressing: []? Met: []? Not Met: []? Adjusted  4. Patient will return to functional activities including swimming without increased symptoms or restriction. []? Progressing: []? Met: []? Not Met: []? Adjusted  5. Patient to be able to perform self care /grooming activities with right shoulder w/o symptoms or restriction. []? Progressing: []? Met: []? Not Met: []? Adjusted          Overall Progression Towards Functional goals/ Treatment Progress Update:  [] Patient is progressing as expected towards functional goals listed. [] Progression is slowed due to complexities/Impairments listed. [] Progression has been slowed due to co-morbidities.   [x] Plan just implemented, too soon to assess goals progression <30days   [] Goals require adjustment due to lack of progress  [] Patient is not progressing as expected and requires additional follow up with physician  [] Other    Persisting Functional Limitations/Impairments:  [x]Sleeping []Sitting               []Standing []Transfers        []Walking []Kneeling               []Stairs []Squatting / bending   []ADLs []Reaching  []Lifting  []Housework  []Driving []Job related tasks  []Sports/Recreation [x]Other: overhead movement/ self care         ASSESSMENT: 10/8: Patient today was feeling lightheaded. BP was measured at 108/64. Patient able to continue AAROM and AROM in pain free ranges. Added wand chest press. Patient continues to demonstrate firm end feel and upper trap compensation during PROM. Pt to further benefit from shoulder rehab to restore mobility as tolerated. 10/6: Patient today tolerated added AROM exercises with right shoulder with reasonable difficulty . The patient required cues to decrease upper trap compensation and management of end range. Patient also point tenderness at posterior deltoid and tenderness in upper trap. The patient was advised to perform neck stretches and PT will follow with them next session. Will continue with Post surgical protocol as tolerated                 Treatment/Activity Tolerance:  [] Patient able to complete tx [] Patient limited by fatigue  [] Patient limited by pain  [] Patient limited by other medical complications  [] Other:     Prognosis: [] Good [] Fair  [] Poor    Patient Requires Follow-up: [x] Yes  [] No    Plan for next treatment session:    PLAN: See rony. PT 2x / week for 6 weeks. [x] Continue per plan of care [] Alter current plan (see comments)  [] Plan of care initiated [] Hold pending MD visit [] Discharge    Electronically signed by: Valdo Michael PT, DPT    Note: If patient does not return for scheduled/ recommended follow up visits, this note will serve as a discharge from care along with most recent update on progress.

## 2020-10-08 NOTE — PROGRESS NOTES
[address]   Electrophysiology Follow up  Date: 10/8/2020      Chief Complaint   Patient presents with    Follow-up        HPI: Chaitanya Tamez is a 61 y.o. female with a PMH of PAF, anxiety, asthma, HLD, depression, slep apnea and HTN. She went to urgent care and her ecg showed atrial fibrillation. A monitor was ordered which revealed afib RVR 25% burden    A/p afib ablation 10/2/2020    Allie Cheadle presents to the office for urgent appointment. She feels at night that her blood pressure and heart rate are elevated at night time the 150 range. Her BP and heart rate are well controlled today though she has frequent PAC's. We discussed placing a holter monitor to determine what rhythm she is having. Past Medical History:   Diagnosis Date    A-fib ([address]     Anxiety     Asthma     Back pain     Depression     Fibromyalgia     Hyperlipidemia     Hypertension     Migraines     Prediabetes     Unspecified sleep apnea     Urinary incontinence         Past Surgical History:   Procedure Laterality Date    BREAST ENHANCEMENT SURGERY      COLONOSCOPY N/A 11/27/2019    COLONOSCOPY POLYPECTOMY SNARE/COLD BIOPSY performed by Pam Calvert MD at [address] SURGERY Left 2017    TONSILLECTOMY  1968    TUBAL LIGATION         Allergies: Allergies   Allergen Reactions    Latex Rash    Statins Nausea Only       Social History:   reports that she has never smoked. She has never used smokeless tobacco. She reports current alcohol use. She reports that she does not use drugs.      Family History:  family history includes Arthritis in her father and mother; Breast Cancer in her maternal grandmother; COPD in her brother, father, and mother; Cancer in her father, maternal uncle, mother, and other family members; Diabetes in her mother; Glaucoma in her maternal grandmother; Heart Disease in her mother; Ovarian Cancer in her mother; Stroke in her brother and mother. Reviewed. Denies family history of sudden cardiac death, arrhythmia, premature CAD    Review of System:  All other systems reviewed and are negative except for that noted above. Pertinent negatives are:   General: negative for fever, chills   Ophthalmic ROS: negative for - eye pain or loss of vision  ENT ROS: negative for - headaches, sore throat   Respiratory: negative for - cough, sputum  Cardiovascular: Reviewed in HPI  Gastrointestinal: negative for - abdominal pain, diarrhea, N/V  Hematology: negative for - bleeding, blood clots, bruising or jaundice  Genito-Urinary:  negative for - Dysuria or incontinence  Musculoskeletal: negative for - Joint swelling, muscle pain  Neurological: negative for - confusion, dizziness, headaches   Psychiatric: No anxiety, no depression. Dermatological: negative for - rash    Physical Examination:  Vitals:    10/08/20 1503   BP: 126/72   Pulse: (!) 41   SpO2:       Wt Readings from Last 3 Encounters:   10/08/20 268 lb 1.9 oz (121.6 kg)   10/03/20 276 lb 10.8 oz (125.5 kg)   08/11/20 266 lb 6.4 oz (120.8 kg)       · Constitutional: Oriented. No distress. · Head: Normocephalic and atraumatic. · Mouth/Throat: Oropharynx is clear and moist.   · Eyes: Conjunctivae normal. EOM are normal.   · Neck: Neck supple. No rigidity. No JVD present. · Cardiovascular: Normal rate, regular rhythm, S1&S2. · Pulmonary/Chest: Bilateral respiratory sounds. No wheezes, No rhonchi. · Abdominal: Soft. Bowel sounds present. No distension, No tenderness. · Musculoskeletal: No tenderness. No edema  Limited Rt shoulder movement  · Lymphadenopathy: Has no cervical adenopathy. · Neurological: Alert and oriented. Cranial nerve appears intact, No Gross deficit   · Skin: Skin is warm and dry. No rash noted. · Psychiatric: Has a normal behavior       Labs, diagnostic and imaging results reviewed. Reviewed.    Lab Results   Component Value Date    TSH 0.35 06/02/2020    TSH 2.90 10/23/2019    CREATININE 0.7 10/02/2020    CREATININE 0.8 06/02/2020    AST 16 10/02/2020    ALT 21 10/02/2020       ECG:  10/8/20  Sinus rhythm with PAC's    CT angio 3/18/19  FINDINGS FOR CORONARY CT ANGIOGRAM  CARDIAC FINDINGS:  Pericardium:  Grossly unremarkable  Left atrium:  Grossly unremarkable  Left ventricle:  Grossly unremarkable  Right atrium:  Grossly unremarkable  Right ventricle:  Grossly unremarkable  Mitral valve:  Grossly unremarkable  Aortic valve:  Grossly unremarkable    CORONARY ARTERIES:  Dominance:  Left  Left main:  Unremarkable  LAD and diagonals:  Unremarkable  LCx and OM:  Unremarkable  RCA:  Unremarkable  There is no evidence of myocardial bridge, coronary aneurysm, or coronary artery anomaly    (The following structures were not completely included on this scan)  LUNGS/AIRWAYS:  Unremarkable  PLEURA: Unremarkable  MEDIASTINUM/JAIME:  Unremarkable  AORTA:  Minimal atherosclerotic calcifications  CHEST WALL/LOWER NECK:  Unremarkable  UPPER ABDOMEN:  Unremarkable  BONES:  Mild degenerative changes of the spine  OTHER:  None    IMPRESSION      Total calcium score is 0 which corresponds to no identifiable coronary calcification and less than 10th percentile for age and sex    Normal CT angiogram of coronary arteries    Echo: 12/28/2018  Summary:  The left ventricular wall motion is normal.  Overall left ventricular ejection fraction is estimated to be 55-60%. Right ventricular systolic pressure is indeterminate due to poorly  visualized tricuspid regurgitation.   Poor endocardial border visualization  There is borderline concentric left ventricular hypertrophy      Medication:  Current Outpatient Medications   Medication Sig Dispense Refill    DULoxetine (CYMBALTA) 60 MG extended release capsule Take 1 capsule by mouth daily 90 capsule 0    montelukast (SINGULAIR) 10 MG tablet Take 1 tablet by mouth nightly 90 tablet 0    metoprolol succinate (TOPROL XL) 25 MG extended release tablet Take 1 tablet by mouth 2 times daily 180 tablet 0    levothyroxine (SYNTHROID) 112 MCG tablet Take 1 tablet by mouth daily 90 tablet 0    colestipol (COLESTID) 1 g tablet Take 1 tablet by mouth 2 times daily 180 tablet 0    vitamin D (ERGOCALCIFEROL) 1.25 MG (58929 UT) CAPS capsule Take 1 capsule by mouth every 14 days 6 capsule 1    gabapentin (NEURONTIN) 300 MG capsule Take 2 capsules by mouth 3 times daily for 90 days. (Patient taking differently: Take 600 mg by mouth 2 times daily. ) 540 capsule 0    rivaroxaban (XARELTO) 20 MG TABS tablet Take 1 tablet by mouth Daily with supper 30 tablet 3    budesonide-formoterol (SYMBICORT) 160-4.5 MCG/ACT AERO Inhale 2 puffs into the lungs 2 times daily 1 Inhaler 0    pantoprazole (PROTONIX) 40 MG tablet Take 1 tablet by mouth every morning (before breakfast) (Patient not taking: Reported on 10/8/2020) 30 tablet 0    cetirizine (ZYRTEC) 10 MG tablet Take 10 mg by mouth daily      albuterol sulfate  (90 Base) MCG/ACT inhaler Inhale 2 puffs into the lungs 4 times daily 1 Inhaler 3    acetaminophen (TYLENOL 8 HOUR) 650 MG extended release tablet Take 650 mg by mouth every 8 hours as needed for Pain       No current facility-administered medications for this visit. Assessment and plan:     PAF  -S/p Afib ablation 10/2/2020  -ECG today shows sinus rhythm with PAC's  -Will do a 7 day holter since her episodes are worse at night   -May take an extra tablet of toprol when she has these episodes of palpitations  -MCOT 6/2020 showed afib RVR with 25% burden   -She is on Xarelto 20 mg daily  -Toprol XL 25 mg BID    HTN  Vitals:    10/08/20 1503   BP: 126/72   Pulse: (!) 41   SpO2:    -Well controlled today  -Home BP monitoring encourage with a BP goal <130/80    Obesity  Body mass index is 47.5 kg/m². - Excessive weight is complicating assessment and treatment.  It is placing patient at risk for multiple co-morbidities as well as early death and contributing to the patient's presentation. - discussed weight management with diet and exercise   -She tries to stay active with walking and water aerobics    Sleep apnea  -She does not use a cpap at this time but encouraged her to use this. Working on a new mask      She might have post ablation and atrial arrhythmias. We will see what the Holter shows and decide about management after that. There is a possibility of some of this to resolve on its own. A redo ablation or use of an antiarrhythmic can be considered in the future. Plan-  7 day holter  May take extra dose of Toprol XL if symptomatic  Follow up with NPAL ad scheduled    - The patient is counseled to follow a low salt diet to assure blood pressure remains controlled for cardiovascular risk factor modification.   - The patient is counseled to avoid excess caffeine, and energy drinks as this may exacerbated ectopy and arrhythmia. - The patient is counseled to get regular exercise 3-5 times per week to control cardiovascular risk factors. - The patient is counseled to lose weigt to control cardiovascular risk factors. Thank you for allowing me to participate in the care of Frankie Gillis. Further evaluation will be based upon the patient's clinical course and testing results. All questions and concerns were addressed to the patient/family. Alternatives to my treatment were discussed. I have discussed the above stated plan and the patient verbalized understanding and agreed with the plan. NOTE: This report was transcribed using voice recognition software. Every effort was made to ensure accuracy, however, inadvertent computerized transcription errors may be present. Carrol Hawkins MD, [facility]   Office: [phone]     Scribe attestation:  This note was scribed in the presence of Carrol Hawkins MD by Kole Bray RN    I, Dr. Carrol Hawkins personally performed the services described in this documentation as scribed by RN in my presence, and it is both accurate and complete.

## 2020-10-13 ENCOUNTER — HOSPITAL ENCOUNTER (OUTPATIENT)
Dept: PHYSICAL THERAPY | Age: 60
Setting detail: THERAPIES SERIES
Discharge: HOME | End: 2020-10-13
Payer: MEDICARE

## 2020-10-13 PROCEDURE — 97110 THERAPEUTIC EXERCISES: CPT

## 2020-10-13 PROCEDURE — 97140 MANUAL THERAPY 1/> REGIONS: CPT

## 2020-10-13 NOTE — FLOWSHEET NOTE
[address] [facility] Physical Therapy  Phone: [phone]   Fax: [phone]    Physical Therapy Daily Treatment Note  Date:  10/13/2020    Patient Name:  Nando Nolasco    :  1960  MRN: [id number]  Medical/Treatment Diagnosis Information:  · Diagnosis: S/P Right Total shoulder sx  · Treatment Diagnosis: Right shoulder pain , decrease AROM, and strength  Insurance/Certification information:  PT Insurance Information: [employer] Childers O  Physician Information:  Referring Practitioner: Marcelo Zamora MD  Plan of care signed (Y/N): []  Yes [x]  No     Date of Patient follow up with Physician: MARIELA     Progress Report: []  Yes  [x]  No     Date Range for reporting period:  Beginnin20  Ending:    Progress report due (10 Rx/or 30 days whichever is less): visit #75     Recertification due (POC duration/ or 90 days whichever is less):     Visit # Insurance Allowable Auth required? Date Range    Mn []  Yes  [x]  No Na     Latex Allergy:  [x]NO      []YES  Preferred Language for Healthcare:   [x]English       []other:    Functional Scale:        Date assessed:  Quick Dash: raw score = 31; dysfunction = 56%  20    Pain level:  0/10     SUBJECTIVE:  Week 8 : 10/13:  Patient reports the right shoulder is feeling ok today, but her back is really bothering her. 10/8: Patient reports that she is having numbness/tingling in 4th & 5th fingers with pain in the hand.       OBJECTIVE: 10/8: Lightheaded    : moderate antalgic gait on R foot       RESTRICTIONS/PRECAUTIONS:  Following Total Shoulder protocol ( [facility]) Avoiding IR     Exercises/Interventions:     Therapeutic Exercises (09151) Resistance / level Sets/sec Reps Notes   Pulleys to 90 deg flexion/scap 2  min      Table top : Flexion/abd , circles        Scapular retraction   10x    Mid rows  High rows  Bicep curls-  Bicep brachai  Tricep  Blue T-band    Blue/purpleT-band 2  2  2     10x  10x  10x         Added 9/14   Wand exercises;  t  Supine Flexion/abd  to 120 deg  ER @ 45 deg  Chest press      5  15             Added 10/8   Isometrics:  Add/abd, ER/IR with Pillow           Ball Rolls ( Red swiss ball)    5sec   10 Added 10/6   Shoulder AROM: using foam roll   Flexion/ abd     10xAdded 10/13   (Seated) scaption   Added 10/6 emphasis to reduce upper trap compensation    UBE  2.5/2.5 fwd/bwd      Therapeutic Activities (86562)                                          Neuromuscular Re-ed (19642)                                                 Manual Intervention (63813)       PROM all planes, G2 mobs infer/pos  12 mins                                            Pt. Education:  -patient educated on diagnosis, prognosis and expectations for rehab  -all patient questions were answered    Home Exercise Program:   Access Code: [email or web address]   URL: [email or web address]   Date: 09/08/2020   Prepared by: Bruce Lynch     Exercises   Seated Shoulder Flexion Slide at Table Top with Forearm in Neutral - 10 reps - 3 sets - 3 hold - 1x daily - 7x weekly         Therapeutic Exercise and NMR EXR  [] (86500) Provided verbal/tactile cueing for activities related to strengthening, flexibility, endurance, ROM for improvements in  [] LE / Lumbar: LE, proximal hip, and core control with self care, mobility, lifting, ambulation. [] UE / Cervical: cervical, postural, scapular, scapulothoracic and UE control with self care, reaching, carrying, lifting, house/yardwork, driving, computer work.  [] (29990) Provided verbal/tactile cueing for activities related to improving balance, coordination, kinesthetic sense, posture, motor skill, proprioception to assist with   [] LE / lumbar: LE, proximal hip, and core control in self care, mobility, lifting, ambulation and eccentric single leg control.    [] UE / cervical: cervical, scapular, scapulothoracic and UE control with self care, reaching, carrying, lifting, house/yardwork, driving, computer work.   [] (76724) Therapist is in constant attendance of 2 or more patients providing skilled therapy interventions, but not providing any significant amount of measurable one-on-one time to either patient, for improvements in  [] LE / lumbar: LE, proximal hip, and core control in self care, mobility, lifting, ambulation and eccentric single leg control. [] UE / cervical: cervical, scapular, scapulothoracic and UE control with self care, reaching, carrying, lifting, house/yardwork, driving, computer work. NMR and Therapeutic Activities:    [] (81640 or 89901) Provided verbal/tactile cueing for activities related to improving balance, coordination, kinesthetic sense, posture, motor skill, proprioception and motor activation to allow for proper function of   [] LE: / Lumbar core, proximal hip and LE with self care and ADLs  [] UE / Cervical: cervical, postural, scapular, scapulothoracic and UE control with self care, carrying, lifting, driving, computer work.   [] (57138) Gait Re-education- Provided training and instruction to the patient for proper LE, core and proximal hip recruitment and positioning and eccentric body weight control with ambulation re-education including up and down stairs     Home Management Training / Self Care:  [] (16924) Provided self-care/home management training related to activities of daily living and compensatory training, and/or use of adaptive equipment for improvement with: ADLs and compensatory training, meal preparation, safety procedures and instruction in use of adaptive equipment, including bathing, grooming, dressing, personal hygiene, basic household cleaning and chores.      Home Exercise Program:    [x] (53370) Reviewed/Progressed HEP activities related to strengthening, flexibility, endurance, ROM of   [] LE / Lumbar: core, proximal hip and LE for functional self-care, mobility, lifting and ambulation/stair navigation   [] UE / Cervical: cervical, postural, scapular, scapulothoracic and UE control with self care, reaching, carrying, lifting, house/yardwork, driving, computer work  [] (45174)Reviewed/Progressed HEP activities related to improving balance, coordination, kinesthetic sense, posture, motor skill, proprioception of   [] LE: core, proximal hip and LE for self care, mobility, lifting, and ambulation/stair navigation    [] UE / Cervical: cervical, postural,  scapular, scapulothoracic and UE control with self care, reaching, carrying, lifting, house/yardwork, driving, computer work    Manual Treatments:  PROM / STM / Oscillations-Mobs:  G-I, II, III, IV (PA's, Inf., Post.)  [x] (96952) Provided manual therapy to mobilize LE, proximal hip and/or LS spine soft tissue/joints for the purpose of modulating pain, promoting relaxation,  increasing ROM, reducing/eliminating soft tissue swelling/inflammation/restriction, improving soft tissue extensibility and allowing for proper ROM for normal function with   [] LE / lumbar: self care, mobility, lifting and ambulation. [] UE / Cervical: self care, reaching, carrying, lifting, house/yardwork, driving, computer work. Modalities:  [] (84592) Vasopneumatic compression: Utilized vasopneumatic compression to decrease edema / swelling for the purpose of improving mobility and quad tone / recruitment which will allow for increased overall function including but not limited to self-care, transfers, ambulation, and ascending / descending stairs.        Modalities: 9/8, 9/10, 9/15, 9/23, 10/6, 10/8, 10/13 CP to Right shoulder supine with bolster underneath BLEs    Charges:  Timed Code Treatment Minutes: 50   Total Treatment Minutes: 60     [] EVAL - LOW (66589)   [] EVAL - MOD (41189)  [] EVAL - HIGH (14525)  [] RE-EVAL (78875)  [x] KZ(81129) x  2     [] Ionto  [] NMR (82491) x       [] Vaso  [x] Manual (28678) x  1    [] Ultrasound  [] TA x        [] Mech Traction (23927)  [] Aquatic Therapy x     [] ES (un) (41428):   [] Home Management Training x  [] ES(attended) (92277)   [] Dry Needling 1-2 muscles (98717):  [] Dry Needling 3+ muscles (195197)  [] Group:      [] Other:     GOALS:  Patient stated goal: To get back to swimming   []? Progressing: []? Met: []? Not Met: []? Adjusted     Therapist goals for Patient:   Short Term Goals: To be achieved in: 2 weeks  1. Independent in HEP and progression per patient tolerance, in order to prevent re-injury. []? Progressing: []? Met: []? Not Met: []? Adjusted  2. Patient will have a decrease in pain to facilitate improvement in movement, function, and ADLs as indicated by Functional Deficits. []? Progressing: []? Met: []? Not Met: []? Adjusted     Long Term Goals: To be achieved in: 6 weeks/ DC   1. Disability index score of 30% or less for the  Quick DASH to assist with reaching prior level of function. []? Progressing: []? Met: []? Not Met: []? Adjusted  2. Patient will demonstrate increased AROM to right shoulder by 15 deg to allow for proper joint functioning as indicated by patients Functional Deficits. []? Progressing: []? Met: []? Not Met: []? Adjusted  3. Patient will demonstrate an increase in right shoulder Strength to 4/5 to allow for proper functional mobility as indicated by patients Functional Deficits. []? Progressing: []? Met: []? Not Met: []? Adjusted  4. Patient will return to functional activities including swimming without increased symptoms or restriction. []? Progressing: []? Met: []? Not Met: []? Adjusted  5. Patient to be able to perform self care /grooming activities with right shoulder w/o symptoms or restriction. []? Progressing: []? Met: []? Not Met: []? Adjusted          Overall Progression Towards Functional goals/ Treatment Progress Update:  [] Patient is progressing as expected towards functional goals listed. [] Progression is slowed due to complexities/Impairments listed. [] Progression has been slowed due to co-morbidities.   [x] Plan just implemented, too soon to assess goals progression <30days   [] Goals require adjustment due to lack of progress  [] Patient is not progressing as expected and requires additional follow up with physician  [] Other    Persisting Functional Limitations/Impairments:  [x]Sleeping []Sitting               []Standing []Transfers        []Walking []Kneeling               []Stairs []Squatting / bending   []ADLs []Reaching  []Lifting  []Housework  []Driving []Job related tasks  []Sports/Recreation [x]Other: overhead movement/ self care         ASSESSMENT: 10/13: Patient tolerated increase shoulder AROM  with minimal to moderate right upper trap compensation. Patient received tactile cues to reduce upper trap compensation during abduction. Patient performed ball rolls along the wall demonstrating increase range. The patient at times still guards during manual therapy . Overall , patient is steadily improving. Patient will continue to benefit from AROM and strengthening exercises per protocol. Treatment/Activity Tolerance:  [] Patient able to complete tx [] Patient limited by fatigue  [] Patient limited by pain  [] Patient limited by other medical complications  [] Other:     Prognosis: [] Good [] Fair  [] Poor    Patient Requires Follow-up: [x] Yes  [] No    Plan for next treatment session:    PLAN: See eval. PT 2x / week for 6 weeks. [x] Continue per plan of care [] Alter current plan (see comments)  [] Plan of care initiated [] Hold pending MD visit [] Discharge    Electronically signed by: Cesia Chawla PT, DPT    Note: If patient does not return for scheduled/ recommended follow up visits, this note will serve as a discharge from care along with most recent update on progress.

## 2020-10-15 ENCOUNTER — HOSPITAL ENCOUNTER (OUTPATIENT)
Dept: PHYSICAL THERAPY | Age: 60
Setting detail: THERAPIES SERIES
Discharge: HOME | End: 2020-10-15
Payer: MEDICARE

## 2020-10-15 PROCEDURE — 97140 MANUAL THERAPY 1/> REGIONS: CPT

## 2020-10-15 PROCEDURE — 97110 THERAPEUTIC EXERCISES: CPT

## 2020-10-15 NOTE — FLOWSHEET NOTE
[address] [facility] Physical Therapy  Phone: [phone]   Fax: [phone]    Physical Therapy Daily Treatment Note  Date:  10/15/2020    Patient Name:  Rashard Booth    :  1960  MRN: [id number]  Medical/Treatment Diagnosis Information:  · Diagnosis: S/P Right Total shoulder sx  · Treatment Diagnosis: Right shoulder pain , decrease AROM, and strength  Insurance/Certification information:  PT Insurance Information: Haile Hanks [facility]  Physician Information:  Referring Practitioner: Josee Devi MD  Plan of care signed (Y/N): []  Yes [x]  No     Date of Patient follow up with Physician: MARIELA     Progress Report: []  Yes  [x]  No     Date Range for reporting period:  Beginnin20  Ending:    Progress report due (10 Rx/or 30 days whichever is less): visit #34     Recertification due (POC duration/ or 90 days whichever is less):     Visit # Insurance Allowable Auth required? Date Range    Mn []  Yes  [x]  No Na     Latex Allergy:  [x]NO      []YES  Preferred Language for Healthcare:   [x]English       []other:    Functional Scale:        Date assessed:  Quick Dash: raw score = 31; dysfunction = 56%  20    Pain level:  0/10     SUBJECTIVE:  Week 8 :   10/13:  Patient reports the right shoulder is feeling ok today, but her back is really bothering her. 10/8: Patient reports that she is having numbness/tingling in 4th & 5th fingers with pain in the hand.       OBJECTIVE: 10/8: Lightheaded    : moderate antalgic gait on R foot       RESTRICTIONS/PRECAUTIONS:  Following Total Shoulder protocol ( Junior's) Avoiding IR     Exercises/Interventions:     Therapeutic Exercises (79627) Resistance / level Sets/sec Reps Notes   Pulleys to 90 deg flexion/scap 2  min      Table top : Flexion/abd , circles        Scapular retraction   10x    Mid rows  High rows  Bicep curls-  Bicep brachai  Tricep extension w/ cable column   Shoulder Add Blue T-band    Blue/purple # 4 2  2  2    2  2 10x  10x  10x    10x  10x     Added 9/14   Wand exercises;  t  Supine Flexion/abd  to 120 deg  ER @ 45 deg  Chest press      5  15             Added 10/8   Isometrics:  Add/abd, ER/IR with Pillow           Ball Rolls ( Red swiss ball) Flexion    5sec   10 Added 10/6   Shoulder AROM: using foam roll   Flexion/ abd     10xAdded 10/13   UE [place] Circles  10xAdded 10/15    (Seated) scaption   Added 10/6 emphasis to reduce upper trap compensation    UBE  2/2 fwd/bwd      Therapeutic Activities (19671)                                          Neuromuscular Re-ed (47768)                                                 Manual Intervention (63103)       PROM all planes, G2 mobs infer/pos, STM to pec minor  8 mins                                            Pt. Education:  -patient educated on diagnosis, prognosis and expectations for rehab  -all patient questions were answered    Home Exercise Program:   Access Code: [email or web address]   URL: [email or web address]   Date: 09/08/2020   Prepared by: Hermelindo Ashton     Exercises   Seated Shoulder Flexion Slide at Table Top with Forearm in Neutral - 10 reps - 3 sets - 3 hold - 1x daily - 7x weekly         Therapeutic Exercise and NMR EXR  [] (38481) Provided verbal/tactile cueing for activities related to strengthening, flexibility, endurance, ROM for improvements in  [] LE / Lumbar: LE, proximal hip, and core control with self care, mobility, lifting, ambulation. [] UE / Cervical: cervical, postural, scapular, scapulothoracic and UE control with self care, reaching, carrying, lifting, house/yardwork, driving, computer work.  [] (32578) Provided verbal/tactile cueing for activities related to improving balance, coordination, kinesthetic sense, posture, motor skill, proprioception to assist with   [] LE / lumbar: LE, proximal hip, and core control in self care, mobility, lifting, ambulation and eccentric single leg control.    [] UE / cervical: cervical, scapular, self-care, mobility, lifting and ambulation/stair navigation   [] UE / Cervical: cervical, postural, scapular, scapulothoracic and UE control with self care, reaching, carrying, lifting, house/yardwork, driving, computer work  [] (26297)Reviewed/Progressed HEP activities related to improving balance, coordination, kinesthetic sense, posture, motor skill, proprioception of   [] LE: core, proximal hip and LE for self care, mobility, lifting, and ambulation/stair navigation    [] UE / Cervical: cervical, postural,  scapular, scapulothoracic and UE control with self care, reaching, carrying, lifting, house/yardwork, driving, computer work    Manual Treatments:  PROM / STM / Oscillations-Mobs:  G-I, II, III, IV (PA's, Inf., Post.)  [x] (90005) Provided manual therapy to mobilize LE, proximal hip and/or LS spine soft tissue/joints for the purpose of modulating pain, promoting relaxation,  increasing ROM, reducing/eliminating soft tissue swelling/inflammation/restriction, improving soft tissue extensibility and allowing for proper ROM for normal function with   [] LE / lumbar: self care, mobility, lifting and ambulation. [] UE / Cervical: self care, reaching, carrying, lifting, house/yardwork, driving, computer work. Modalities:  [] (93826) Vasopneumatic compression: Utilized vasopneumatic compression to decrease edema / swelling for the purpose of improving mobility and quad tone / recruitment which will allow for increased overall function including but not limited to self-care, transfers, ambulation, and ascending / descending stairs.        Modalities: 9/8, 9/10, 9/15, 9/23, 10/6, 10/8, 10/13, 10/15 CP to Right shoulder supine with bolster underneath BLEs    Charges:  Timed Code Treatment Minutes: 42   Total Treatment Minutes: 52     [] EVAL - LOW (48548)   [] EVAL - MOD (10638)  [] EVAL - HIGH (13646)  [] RE-EVAL (11716)  [x] NA(86919) x  2     [] Ionto  [] NMR (09795) x       [] Vaso  [x] Manual (71360) x  1 [] Ultrasound  [] TA x        [] [facility] (75[facility]  [] Aquatic Therapy x     [] ES (un) (20236):   [] Home Management Training x  [] ES(attended) (71721)   [] Dry Needling 1-2 muscles (77479):  [] Dry Needling 3+ muscles (161139)  [] Group:      [] Other:     GOALS:  Patient stated goal: To get back to swimming   []? Progressing: []? Met: []? Not Met: []? Adjusted     Therapist goals for Patient:   Short Term Goals: To be achieved in: 2 weeks  1. Independent in HEP and progression per patient tolerance, in order to prevent re-injury. []? Progressing: []? Met: []? Not Met: []? Adjusted  2. Patient will have a decrease in pain to facilitate improvement in movement, function, and ADLs as indicated by Functional Deficits. []? Progressing: []? Met: []? Not Met: []? Adjusted     Long Term Goals: To be achieved in: 6 weeks/ DC   1. Disability index score of 30% or less for the  Quick DASH to assist with reaching prior level of function. []? Progressing: []? Met: []? Not Met: []? Adjusted  2. Patient will demonstrate increased AROM to right shoulder by 15 deg to allow for proper joint functioning as indicated by patients Functional Deficits. []? Progressing: []? Met: []? Not Met: []? Adjusted  3. Patient will demonstrate an increase in right shoulder Strength to 4/5 to allow for proper functional mobility as indicated by patients Functional Deficits. []? Progressing: []? Met: []? Not Met: []? Adjusted  4. Patient will return to functional activities including swimming without increased symptoms or restriction. []? Progressing: []? Met: []? Not Met: []? Adjusted  5. Patient to be able to perform self care /grooming activities with right shoulder w/o symptoms or restriction. []? Progressing: []? Met: []? Not Met: []? Adjusted          Overall Progression Towards Functional goals/ Treatment Progress Update:  [] Patient is progressing as expected towards functional goals listed.     [] Progression is slowed due to complexities/Impairments listed. [] Progression has been slowed due to co-morbidities. [x] Plan just implemented, too soon to assess goals progression <30days   [] Goals require adjustment due to lack of progress  [] Patient is not progressing as expected and requires additional follow up with physician  [] Other    Persisting Functional Limitations/Impairments:  [x]Sleeping []Sitting               []Standing []Transfers        []Walking []Kneeling               []Stairs []Squatting / bending   []ADLs []Reaching  []Lifting  []Housework  []Driving []Job related tasks  []Sports/Recreation [x]Other: overhead movement/ self care         ASSESSMENT:  10/15: Patient able to complete AAROM and AROM exercises with minor discomfort. Added  UE  [place] and Shoulder add light resistance. Patient required less this cues date for upper trap compensation. Patient demonstrated pec minor tightness/tenderness with STM; After STM pt was able to gain increase PROM. Patient to further benefit from shoulder rehab to restore AROM. 10/13: Patient tolerated increase shoulder AROM  with minimal to moderate right upper trap compensation. Patient received tactile cues to reduce upper trap compensation during abduction. Patient performed ball rolls along the wall demonstrating increase range. The patient at times still guards during manual therapy . Overall , patient is steadily improving. Patient will continue to benefit from AROM and strengthening exercises per protocol. Treatment/Activity Tolerance:  [] Patient able to complete tx [] Patient limited by fatigue  [] Patient limited by pain  [] Patient limited by other medical complications  [] Other:     Prognosis: [] Good [] Fair  [] Poor    Patient Requires Follow-up: [x] Yes  [] No    Plan for next treatment session:    PLAN: See eval. PT 2x / week for 6 weeks.    [x] Continue per plan of care [] Alter current plan (see comments)  [] Plan of care initiated [] Hold pending MD visit [] Discharge    Electronically signed by: Mercy Munguia PT, DPT    Note: If patient does not return for scheduled/ recommended follow up visits, this note will serve as a discharge from care along with most recent update on progress.

## 2020-10-20 ENCOUNTER — HOSPITAL ENCOUNTER (OUTPATIENT)
Dept: PHYSICAL THERAPY | Age: 60
Setting detail: THERAPIES SERIES
Discharge: HOME | End: 2020-10-20
Payer: MEDICARE

## 2020-10-20 PROCEDURE — 0298T PR EXT ECG > 48HR TO 21 DAY REVIEW AND INTERPRETATN: CPT | Performed by: INTERNAL MEDICINE

## 2020-10-20 PROCEDURE — 97110 THERAPEUTIC EXERCISES: CPT

## 2020-10-20 PROCEDURE — 97140 MANUAL THERAPY 1/> REGIONS: CPT

## 2020-10-20 NOTE — FLOWSHEET NOTE
[address] [facility] Physical Therapy  Phone: [phone]   Fax: [phone]    Physical Therapy Daily Treatment Note  Date:  10/20/2020    Patient Name:  Rashard Booth    :  1960  MRN: [id number]  Medical/Treatment Diagnosis Information:  · Diagnosis: S/P Right Total shoulder sx  · Treatment Diagnosis: Right shoulder pain , decrease AROM, and strength  Insurance/Certification information:  [place] Information[place] [facility]  Physician Information:  Referring Practitioner: Josee Devi MD  Plan of care signed (Y/N): []  Yes [x]  No     Date of Patient follow up with Physician: MARIELA     Progress Report: []  Yes  [x]  No     Date Range for reporting period:  Beginnin20  Ending:    Progress report due (10 Rx/or 30 days whichever is less): visit #58     Recertification due (POC duration/ or 90 days whichever is less):     Visit # Insurance Allowable Auth required? Date Range    Mn []  Yes  [x]  No Na     Latex Allergy:  [x]NO      []YES  Preferred Language for Healthcare:   [x]English       []other:    Functional Scale:        Date assessed:  Quick Dash: raw score = 31; dysfunction = 56%  20    Pain level:  0/10     SUBJECTIVE:  Week 9 : 10/20 : Patient reports that both shoulders are bothering her this morning. Says the right shoulder feels weaker. 10/13:  Patient reports the right shoulder is feeling ok today, but her back is really bothering her. 10/8: Patient reports that she is having numbness/tingling in 4th & 5th fingers with pain in the hand.       OBJECTIVE: 10/8: Lightheaded    : moderate antalgic gait on R foot       RESTRICTIONS/PRECAUTIONS:  Following Total Shoulder protocol ( Junior's) Avoiding IR     Exercises/Interventions:     Therapeutic Exercises (72978) Resistance / level Sets/sec Reps Notes   Pulleys to 90 deg flexion/scap 2  min      Table top : Flexion/abd , circles        Scapular retraction   10x    Mid rows  High rows  Bicep curls-  Bicep brachai  Tricep extension w/ cable column   Shoulder Add Blue T-band    Blue/ # 4# 2  2  2    2  2 10x  10x  10x    10x  10x     Added 9/14   Wand exercises;  t  Supine Flexion/abd  to 120 deg  ER @ 45 deg  Chest press   Supine:  Flexion / abd      5  15             Added 10/8   Isometrics:  Add/abd, ER/IR with Pillow           Ball Rolls ( Red swiss ball) Flexion    5sec   10 Added 10/6   Shoulder AROM: using foam roll   Flexion/ abd     10xAdded 10/13   UE [place] Circles  10xAdded 10/15    (Seated) scaption   Added 10/6 emphasis to reduce upper trap compensation    UBE  2/2 fwd/bwd      Therapeutic Activities (90174)                                          Neuromuscular Re-ed (20016)                                                 Manual Intervention (52667)       PROM all planes, G2 mobs infer/pos, STM to pec minor   12 min                                            Pt. Education:  -patient educated on diagnosis, prognosis and expectations for rehab  -all patient questions were answered    Home Exercise Program:   Access Code: [email or web address]   URL: [email or web address]   Date: 09/08/2020   Prepared by: Mercy Munguia     Exercises   Seated Shoulder Flexion Slide at Table Top with Forearm in Neutral - 10 reps - 3 sets - 3 hold - 1x daily - 7x weekly         Therapeutic Exercise and NMR EXR  [] (83365) Provided verbal/tactile cueing for activities related to strengthening, flexibility, endurance, ROM for improvements in  [] LE / Lumbar: LE, proximal hip, and core control with self care, mobility, lifting, ambulation.   [] UE / Cervical: cervical, postural, scapular, scapulothoracic and UE control with self care, reaching, carrying, lifting, house/yardwork, driving, computer work.  [] (73238) Provided verbal/tactile cueing for activities related to improving balance, coordination, kinesthetic sense, posture, motor skill, proprioception to assist with   [] LE / lumbar: LE, proximal hip, and core control in self care, mobility, lifting, ambulation and eccentric single leg control. [] UE / cervical: cervical, scapular, scapulothoracic and UE control with self care, reaching, carrying, lifting, house/yardwork, driving, computer work.   [] (80708) Therapist is in constant attendance of 2 or more patients providing skilled therapy interventions, but not providing any significant amount of measurable one-on-one time to either patient, for improvements in  [] LE / lumbar: LE, proximal hip, and core control in self care, mobility, lifting, ambulation and eccentric single leg control. [] UE / cervical: cervical, scapular, scapulothoracic and UE control with self care, reaching, carrying, lifting, house/yardwork, driving, computer work. NMR and Therapeutic Activities:    [] (50740 or 05038) Provided verbal/tactile cueing for activities related to improving balance, coordination, kinesthetic sense, posture, motor skill, proprioception and motor activation to allow for proper function of   [] LE: / Lumbar core, proximal hip and LE with self care and ADLs  [] UE / Cervical: cervical, postural, scapular, scapulothoracic and UE control with self care, carrying, lifting, driving, computer work.   [] (58015) Gait Re-education- Provided training and instruction to the patient for proper LE, core and proximal hip recruitment and positioning and eccentric body weight control with ambulation re-education including up and down stairs     Home Management Training / Self Care:  [] (74215) Provided self-care/home management training related to activities of daily living and compensatory training, and/or use of adaptive equipment for improvement with: ADLs and compensatory training, meal preparation, safety procedures and instruction in use of adaptive equipment, including bathing, grooming, dressing, personal hygiene, basic household cleaning and chores.      Home Exercise Program:    [x] (82709) Reviewed/Progressed HEP activities related to strengthening, flexibility, endurance, ROM of   [] LE / Lumbar: core, proximal hip and LE for functional self-care, mobility, lifting and ambulation/stair navigation   [] UE / Cervical: cervical, postural, scapular, scapulothoracic and UE control with self care, reaching, carrying, lifting, house/yardwork, driving, computer work  [] (04540)Reviewed/Progressed HEP activities related to improving balance, coordination, kinesthetic sense, posture, motor skill, proprioception of   [] LE: core, proximal hip and LE for self care, mobility, lifting, and ambulation/stair navigation    [] UE / Cervical: cervical, postural,  scapular, scapulothoracic and UE control with self care, reaching, carrying, lifting, house/yardwork, driving, computer work    Manual Treatments:  PROM / STM / Oscillations-Mobs:  G-I, II, III, IV (PA's, Inf., Post.)  [x] (77668) Provided manual therapy to mobilize LE, proximal hip and/or LS spine soft tissue/joints for the purpose of modulating pain, promoting relaxation,  increasing ROM, reducing/eliminating soft tissue swelling/inflammation/restriction, improving soft tissue extensibility and allowing for proper ROM for normal function with   [] LE / lumbar: self care, mobility, lifting and ambulation. [] UE / Cervical: self care, reaching, carrying, lifting, house/yardwork, driving, computer work. Modalities:  [] (24575) Vasopneumatic compression: Utilized vasopneumatic compression to decrease edema / swelling for the purpose of improving mobility and quad tone / recruitment which will allow for increased overall function including but not limited to self-care, transfers, ambulation, and ascending / descending stairs.        Modalities: 9/8, 9/10, 9/15, 9/23, 10/6, 10/8, 10/13, 10/15, 10/20 CP to Right shoulder supine with bolster underneath BLEs    Charges:  Timed Code Treatment Minutes: 49   Total Treatment Minutes: 59     [] EVAL - LOW (78786)   [] EVAL - MOD (56009)  [] EVAL - HIGH (33193)  [] RE-EVAL (53560)  [x] BL(01493) x  2     [] Ionto  [] NMR (14230) x       [] Vaso  [x] Manual (96387) x  1    [] Ultrasound  [] TA x        [] Mech Traction (93862)  [] Aquatic Therapy x     [] ES (un) (90337):   [] Home Management Training x  [] ES(attended) (68481)   [] Dry Needling 1-2 muscles (75061):  [] Dry Needling 3+ muscles (822013)  [] Group:      [] Other:     GOALS:  Patient stated goal: To get back to swimming   []? Progressing: []? Met: []? Not Met: []? Adjusted     Therapist goals for Patient:   Short Term Goals: To be achieved in: 2 weeks  1. Independent in HEP and progression per patient tolerance, in order to prevent re-injury. []? Progressing: []? Met: []? Not Met: []? Adjusted  2. Patient will have a decrease in pain to facilitate improvement in movement, function, and ADLs as indicated by Functional Deficits. []? Progressing: []? Met: []? Not Met: []? Adjusted     Long Term Goals: To be achieved in: 6 weeks/ DC   1. Disability index score of 30% or less for the  Quick DASH to assist with reaching prior level of function. []? Progressing: []? Met: []? Not Met: []? Adjusted  2. Patient will demonstrate increased AROM to right shoulder by 15 deg to allow for proper joint functioning as indicated by patients Functional Deficits. []? Progressing: []? Met: []? Not Met: []? Adjusted  3. Patient will demonstrate an increase in right shoulder Strength to 4/5 to allow for proper functional mobility as indicated by patients Functional Deficits. []? Progressing: []? Met: []? Not Met: []? Adjusted  4. Patient will return to functional activities including swimming without increased symptoms or restriction. []? Progressing: []? Met: []? Not Met: []? Adjusted  5. Patient to be able to perform self care /grooming activities with right shoulder w/o symptoms or restriction. []? Progressing: []? Met: []? Not Met: []?  Adjusted          Overall Progression Towards Functional goals/ Treatment Progress Update:  [] Patient is progressing as expected towards functional goals listed. [] Progression is slowed due to complexities/Impairments listed. [] Progression has been slowed due to co-morbidities. [x] Plan just implemented, too soon to assess goals progression <30days   [] Goals require adjustment due to lack of progress  [] Patient is not progressing as expected and requires additional follow up with physician  [] Other    Persisting Functional Limitations/Impairments:  [x]Sleeping []Sitting               []Standing []Transfers        []Walking []Kneeling               []Stairs []Squatting / bending   []ADLs []Reaching  []Lifting  []Housework  []Driving []Job related tasks  []Sports/Recreation [x]Other: overhead movement/ self care         ASSESSMENT:  10/20: The patient performed increase AROM exercises with right shoulder with minimal discomfort. Patient presents with increase pec minor trigger point tightness. She demonstrated increase PROM in flexion/abd after manual therapy. Patient to be reassessed for next session. 10/15: Patient able to complete AAROM and AROM exercises with minor discomfort. Added  UE  [place] and Shoulder add light resistance. Patient required less this cues date for upper trap compensation. Patient demonstrated pec minor tightness/tenderness with STM; After STM pt was able to gain increase PROM. Patient to further benefit from shoulder rehab to restore AROM. 10/13: Patient tolerated increase shoulder AROM  with minimal to moderate right upper trap compensation. Patient received tactile cues to reduce upper trap compensation during abduction. Patient performed ball rolls along the wall demonstrating increase range. The patient at times still guards during manual therapy . Overall , patient is steadily improving. Patient will continue to benefit from AROM and strengthening exercises per protocol.                       Treatment/Activity Tolerance:  [] Patient able to complete tx [] Patient limited by fatigue  [] Patient limited by pain  [] Patient limited by other medical complications  [] Other:     Prognosis: [] Good [] Fair  [] Poor    Patient Requires Follow-up: [x] Yes  [] No    Plan for next treatment session:    PLAN: See eval. PT 2x / week for 6 weeks. [x] Continue per plan of care [] Alter current plan (see comments)  [] Plan of care initiated [] Hold pending MD visit [] Discharge    Electronically signed by: Eloise Son PT, DPT    Note: If patient does not return for scheduled/ recommended follow up visits, this note will serve as a discharge from care along with most recent update on progress.

## 2020-10-22 ENCOUNTER — HOSPITAL ENCOUNTER (OUTPATIENT)
Dept: PHYSICAL THERAPY | Age: 60
Setting detail: THERAPIES SERIES
Discharge: HOME | End: 2020-10-22
Payer: MEDICARE

## 2020-10-22 PROCEDURE — 97530 THERAPEUTIC ACTIVITIES: CPT

## 2020-10-22 PROCEDURE — 97110 THERAPEUTIC EXERCISES: CPT

## 2020-10-22 NOTE — FLOWSHEET NOTE
[facility]  Phone: [phone]   Fax: [phone]   Physical Therapy Re-Certification Plan of Care    Dear  ,Pan Mathews MD     We had the pleasure of treating the following patient for physical therapy services at [address] [place] [address]. A summary of our findings can be found in the updated assessment below. This includes our plan of care. If you have any questions or concerns regarding these findings, please do not hesitate to contact me at the office phone number checked above. Thank you for the referral.     Physician Signature:________________________________Date:__________________  By signing above (or electronic signature), therapists plan is approved by physician      Functional Outcome: Quick Dash TBD next session     Overall Response to Treatment:   [x]Patient is responding well to treatment and improvement is noted with regards  to goals   []Patient should continue to improve in reasonable time if they continue HEP   []Patient has plateaued and is no longer responding to skilled PT intervention    []Patient is getting worse and would benefit from return to referring MD   []Patient unable to adhere to initial POC   []Other:     Date range of Visits: 20 to 10/22/2020  Total Visits: 10    Recommendation:    [x]Continue PT 2x / wk for 4 weeks.     []Hold PT, pending MD visit    Physical Therapy Daily Treatment Note  Date:  10/22/2020    Patient Name:  Alice Hayes    :  1960  MRN: [id number]  Medical/Treatment Diagnosis Information:  · Diagnosis: S/P Right Total shoulder sx  · Treatment Diagnosis: Right shoulder pain , decrease AROM, and strength  Insurance/Certification information:  PT Insurance Information: [employer]abel[employer] [facility]  Physician Information:  Referring Practitioner: Pan Mathews MD  Plan of care signed (Y/N): []  Yes [x]  No     Date of Patient follow up with Physician: MARIELA     Progress Report: []  Yes  [x] No     Date Range for reporting period:  Beginnin20  Ending:10/22/20    Progress report due (10 Rx/or 30 days whichever is less): visit #98     Recertification due (POC duration/ or 90 days whichever is less):     Visit # Insurance Allowable Auth required? Date Range   10/12 Mn []  Yes  [x]  No Na     Latex Allergy:  [x]NO      []YES  Preferred Language for Healthcare:   [x]English       []other:    Functional Scale:   Quick Dash: raw score=      Date assessed:  Quick Dash: raw score = 31; dysfunction = 56%  20    Pain level:  0/10     SUBJECTIVE:  Week 9 : 10/22: Patient reports that her right shoulder has been feeling pretty this morning.          OBJECTIVE:     PROM AROM     L R L R   Cervical Flexion  [facility] WFL   Cervical Extension  [facility] WFL   Cervical Rotation [facility] WFL WFL   Cervical Side-bend [facility] WFL   Shoulder Flexion    130   117 vs 150   Shoulder Abduction    85   74 vs 130   Shoulder External Rotation  47         Strength (0-5) Left Right    Shoulder Shrug (C4) 4+ +4   Shoulder Flex 5 3+   Shoulder Abd (C5) 5 3+   Shoulder ER 5 3+   Shoulder IR 5 3+   Biceps (C6) 5     Triceps (C7) 5     Lats       Middle Trap       Rhomboids       Lower Trap        Pronation  3+ 4   Supination  3+ 4   Wrist Flex (C7) 3+ 4   Wrist Ext (C6) 3+ 4   Wrist Radial Deviation 3+ 4   Wrist Ulnar Deviation 3+ 4             10/8: Lightheaded    : moderate antalgic gait on R foot       RESTRICTIONS/PRECAUTIONS:  Following Total Shoulder protocol ( [facility]) Avoiding IR     Exercises/Interventions:     Therapeutic Exercises (51320) Resistance / level Sets/sec Reps Notes   Pulleys to 90 deg flexion/scap 2  min      Table top : Flexion/abd , circles        Scapular retraction   10x    Mid rows  High rows  Bicep curls-  Bicep brachai  Tricep extension w/ cable column   Shoulder Add Blue T-band    Blue/ # 4#    Blue  2  2  2    2  2 10x  10x  10x    10x  10x     Added    Lupis exercises;  t  Supine Flexion/abd  to 120 deg  ER @ 45 deg  Chest press   Supine:  Flexion / abd      5  15             Added 10/8   Isometrics:  Add/abd, ER/IR with Pillow           Ball Rolls ( Red swiss ball) Flexion    5sec   10 Added 10/6   Shoulder AROM: using foam roll   Flexion/ abd   Shelf reaching (2nd shelf)     10xAdded 10/13   UE [place] Circles  Added 10/15    (Seated) scaption   Added 10/6 emphasis to reduce upper trap compensation    UBE  2/2 fwd/bwd      Therapeutic Activities (35065)       Assessment                                    Neuromuscular Re-ed (39209)                                                 Manual Intervention (55677)       PROM all planes, G2 mobs infer/pos, STM to pec minor   12 min                                            Pt. Education:  -patient educated on diagnosis, prognosis and expectations for rehab  -all patient questions were answered    Home Exercise Program:   Access Code: [email or web address]   URL: [email or web address]   Date: 09/08/2020   Prepared by: Radha Cheema     Exercises   Seated Shoulder Flexion Slide at Table Top with Forearm in Neutral - 10 reps - 3 sets - 3 hold - 1x daily - 7x weekly         Therapeutic Exercise and NMR EXR  [] (08555) Provided verbal/tactile cueing for activities related to strengthening, flexibility, endurance, ROM for improvements in  [] LE / Lumbar: LE, proximal hip, and core control with self care, mobility, lifting, ambulation. [] UE / Cervical: cervical, postural, scapular, scapulothoracic and UE control with self care, reaching, carrying, lifting, house/yardwork, driving, computer work.  [] (40980) Provided verbal/tactile cueing for activities related to improving balance, coordination, kinesthetic sense, posture, motor skill, proprioception to assist with   [] LE / lumbar: LE, proximal hip, and core control in self care, mobility, lifting, ambulation and eccentric single leg control.    [] UE / cervical: cervical, scapular, scapulothoracic and UE control with self care, reaching, carrying, lifting, house/yardwork, driving, computer work.   [] (83783) Therapist is in constant attendance of 2 or more patients providing skilled therapy interventions, but not providing any significant amount of measurable one-on-one time to either patient, for improvements in  [] LE / lumbar: LE, proximal hip, and core control in self care, mobility, lifting, ambulation and eccentric single leg control. [] UE / cervical: cervical, scapular, scapulothoracic and UE control with self care, reaching, carrying, lifting, house/yardwork, driving, computer work. NMR and Therapeutic Activities:    [] (29813 or 05919) Provided verbal/tactile cueing for activities related to improving balance, coordination, kinesthetic sense, posture, motor skill, proprioception and motor activation to allow for proper function of   [] LE: / Lumbar core, proximal hip and LE with self care and ADLs  [] UE / Cervical: cervical, postural, scapular, scapulothoracic and UE control with self care, carrying, lifting, driving, computer work.   [] (60251) Gait Re-education- Provided training and instruction to the patient for proper LE, core and proximal hip recruitment and positioning and eccentric body weight control with ambulation re-education including up and down stairs     Home Management Training / Self Care:  [] (07838) Provided self-care/home management training related to activities of daily living and compensatory training, and/or use of adaptive equipment for improvement with: ADLs and compensatory training, meal preparation, safety procedures and instruction in use of adaptive equipment, including bathing, grooming, dressing, personal hygiene, basic household cleaning and chores.      Home Exercise Program:    [x] (23104) Reviewed/Progressed HEP activities related to strengthening, flexibility, endurance, ROM of   [] LE / Lumbar: core, proximal hip and LE for functional ([phone]) x      [] Ultrasound  [x] TA x 1        [] Mech Traction (96759)  [] Aquatic Therapy x     [] ES (un) (10932):   [] Home Management Training x  [] ES(attended) (92990)   [] Dry Needling 1-2 muscles (34781):  [] Dry Needling 3+ muscles (520044)  [] Group:      [] Other:     GOALS:  Patient stated goal: To get back to swimming   []? Progressing: []? Met: []? Not Met: []? Adjusted     Therapist goals for Patient:   Short Term Goals: To be achieved in: 2 weeks  1. Independent in HEP and progression per patient tolerance, in order to prevent re-injury. []? Progressing: []? Met: []? Not Met: []? Adjusted  2. Patient will have a decrease in pain to facilitate improvement in movement, function, and ADLs as indicated by Functional Deficits. []? Progressing: []? Met: []? Not Met: []? Adjusted     Long Term Goals: To be achieved in: 6 weeks/ DC   1. Disability index score of 30% or less for the  Quick DASH to assist with reaching prior level of function. []? Progressing: []? Met: []? Not Met: []? Adjusted  2. Patient will demonstrate increased AROM to right shoulder by 15 deg to allow for proper joint functioning as indicated by patients Functional Deficits. []? Progressing: []? Met: []? Not Met: []? Adjusted  3. Patient will demonstrate an increase in right shoulder Strength to 4/5 to allow for proper functional mobility as indicated by patients Functional Deficits. []? Progressing: []? Met: []? Not Met: []? Adjusted  4. Patient will return to functional activities including swimming without increased symptoms or restriction. []? Progressing: []? Met: []? Not Met: []? Adjusted  5. Patient to be able to perform self care /grooming activities with right shoulder w/o symptoms or restriction. []? Progressing: []? Met: []? Not Met: []? Adjusted          Overall Progression Towards Functional goals/ Treatment Progress Update:  [] Patient is progressing as expected towards functional goals listed.     [] Progression is slowed due to complexities/Impairments listed. [] Progression has been slowed due to co-morbidities. [x] Plan just implemented, too soon to assess goals progression <30days   [] Goals require adjustment due to lack of progress  [] Patient is not progressing as expected and requires additional follow up with physician  [] Other    Persisting Functional Limitations/Impairments:  [x]Sleeping []Sitting               []Standing []Transfers        []Walking []Kneeling               []Stairs []Squatting / bending   []ADLs []Reaching  []Lifting  []Housework  []Driving []Job related tasks  []Sports/Recreation [x]Other: overhead movement/ self care         ASSESSMENT: 10/22: Patient has made marked improvement with right shoulder AROM/ PROM . Patient also improved with strength in all planes. End range is still compensated some upper traps requiring frequent cues. The patient will benefit from continuing further PT to reduce muscle tightness and produce efficient end range for ADLs. Treatment/Activity Tolerance:  [] Patient able to complete tx [] Patient limited by fatigue  [] Patient limited by pain  [] Patient limited by other medical complications  [] Other:     Prognosis: [] Good [] Fair  [] Poor    Patient Requires Follow-up: [x] Yes  [] No    Plan for next treatment session: Continue AROM and progressing strengthening     PLAN: updated  PT 2x / week for 4 weeks. [x] Continue per plan of care [] Alter current plan (see comments)  [] Plan of care initiated [] Hold pending MD visit [] Discharge    Electronically signed by: Valdo Michael PT, DPT    Note: If patient does not return for scheduled/ recommended follow up visits, this note will serve as a discharge from care along with most recent update on progress.

## 2020-10-26 ENCOUNTER — HOSPITAL ENCOUNTER (OUTPATIENT)
Dept: PHYSICAL THERAPY | Age: 60
Setting detail: THERAPIES SERIES
Discharge: HOME | End: 2020-10-26
Payer: MEDICARE

## 2020-10-26 PROCEDURE — 97110 THERAPEUTIC EXERCISES: CPT

## 2020-10-26 PROCEDURE — 97140 MANUAL THERAPY 1/> REGIONS: CPT

## 2020-10-26 NOTE — FLOWSHEET NOTE
[address] [facility]  Phone: [phone]   Fax: [phone]   Physical Therapy Re-Certification Plan of Care        Recommendation:    [x]Continue PT 2x / wk for 4 weeks. []Hold PT, pending MD visit    Physical Therapy Daily Treatment Note  Date:  10/26/2020    Patient Name:  Po Simon    :  1960  MRN: [id number]  Medical/Treatment Diagnosis Information:  · Diagnosis: S/P Right Total shoulder sx  · Treatment Diagnosis: Right shoulder pain , decrease AROM, and strength  Insurance/Certification information:  PT Insurance Information: [employer]  Physician Information:  Referring Practitioner: Chiara Toro MD  Plan of care signed (Y/N): []  Yes [x]  No     Date of Patient follow up with Physician: MARIELA     Progress Report: []  Yes  [x]  No     Date Range for reporting period:  Beginnin20  Ending:10/22/20    Progress report due (10 Rx/or 30 days whichever is less): visit #10     Recertification due (POC duration/ or 90 days whichever is less):     Visit # Insurance Allowable Auth required?  Date Range   10/12 Mn []  Yes  [x]  No Na     Latex Allergy:  [x]NO      []YES  Preferred Language for Healthcare:   [x]English       []other:    Functional Scale:   Quick Dash: raw score= 22; dysfunction =25%      Date assessed:  Quick Dash: raw score = 31; dysfunction = 56%  20    Pain level:  0/10     SUBJECTIVE:  Week 10 : 10/26: Patient reports stiffness in all her joints   OBJECTIVE:            PROM AROM     L R L R   Cervical Flexion  [facility] WFL   Cervical Extension  [facility] WFL   Cervical Rotation [facility] WFL WFL   Cervical Side-bend [facility] WFL   Shoulder Flexion    130   117 vs 150   Shoulder Abduction    85   74 vs 130   Shoulder External Rotation  47         Strength (0-5) Left Right    Shoulder Shrug (C4) 4+ +4   Shoulder Flex 5 3+   Shoulder Abd (C5) 5 3+   Shoulder ER 5 3+   Shoulder IR 5 3+   Biceps (C6) 5     Triceps (C7) 5     Lats       Middle Trap       Rhomboids       Lower Trap        Pronation  3+ 4   Supination  3+ 4   Wrist Flex (C7) 3+ 4   Wrist Ext (C6) 3+ 4   Wrist Radial Deviation 3+ 4   Wrist Ulnar Deviation 3+ 4             10/8: Lightheaded    9/14: moderate antalgic gait on R foot       RESTRICTIONS/PRECAUTIONS:  Following Total Shoulder protocol ( [place]) Avoiding IR     Exercises/Interventions:     Therapeutic Exercises (78834) Resistance / level Sets/sec Reps Notes   Pulleys to 90 deg flexion/scap 2  min      Table top : Flexion/abd , circles        Scapular retraction   10x    Mid rows  High rows  Bicep curls-  Bicep brachai  Tricep extension w/ cable column   Shoulder Add Blue /FM #25    Blue/ # 4#    Blue /#10 2  2  2    2  2 10x  10x  10x    10x  10x     Added 9/14   [place] exercises;  t  Supine Flexion/abd  to 120 deg  ER @ 45 deg  Chest press   Supine:  Flexion / abd      5  15             Added 10/8   Isometrics:  Add/abd, ER/IR with Pillow           Ball Rolls ( Red swiss ball) Flexion    5sec    Added 10/6   Shoulder AROM: using foam roll   Flexion/ abd   Shelf reaching (2nd shelf)    #2 10xAdded 10/13   UE [place] Circles  Added 10/15    (Seated) scaption   Added 10/6 emphasis to reduce upper trap compensation    UBE  2/2 fwd/bwd      Therapeutic Activities (14229)       Assessment                                    Neuromuscular Re-ed (02480)                                                 Manual Intervention (41520)       PROM all planes, G2 mobs infer/pos, STM to pec minor   15 min                                            Pt. Education:  -patient educated on diagnosis, prognosis and expectations for rehab  -all patient questions were answered    Home Exercise Program:   Access Code: [email or web address]   URL: [email or web address]   Date: 09/08/2020   Prepared by: Diana Banuelos     Exercises   Seated Shoulder Flexion Slide at Table Top with Forearm in Neutral - 10 reps - 3 sets - 3 hold - 1x daily - 7x weekly         Therapeutic Exercise and NMR EXR  [] (11411) Provided verbal/tactile cueing for activities related to strengthening, flexibility, endurance, ROM for improvements in  [] LE / Lumbar: LE, proximal hip, and core control with self care, mobility, lifting, ambulation. [] UE / Cervical: cervical, postural, scapular, scapulothoracic and UE control with self care, reaching, carrying, lifting, house/yardwork, driving, computer work.  [] (56557) Provided verbal/tactile cueing for activities related to improving balance, coordination, kinesthetic sense, posture, motor skill, proprioception to assist with   [] LE / lumbar: LE, proximal hip, and core control in self care, mobility, lifting, ambulation and eccentric single leg control. [] UE / cervical: cervical, scapular, scapulothoracic and UE control with self care, reaching, carrying, lifting, house/yardwork, driving, computer work.   [] (64157) Therapist is in constant attendance of 2 or more patients providing skilled therapy interventions, but not providing any significant amount of measurable one-on-one time to either patient, for improvements in  [] LE / lumbar: LE, proximal hip, and core control in self care, mobility, lifting, ambulation and eccentric single leg control. [] UE / cervical: cervical, scapular, scapulothoracic and UE control with self care, reaching, carrying, lifting, house/yardwork, driving, computer work.      NMR and Therapeutic Activities:    [] (60521 or 11988) Provided verbal/tactile cueing for activities related to improving balance, coordination, kinesthetic sense, posture, motor skill, proprioception and motor activation to allow for proper function of   [] LE: / Lumbar core, proximal hip and LE with self care and ADLs  [] UE / Cervical: cervical, postural, scapular, scapulothoracic and UE control with self care, carrying, lifting, driving, computer work.   [] (94719) Gait Re-education- Provided training and instruction to the patient for proper LE, core and proximal hip recruitment and positioning and eccentric body weight control with ambulation re-education including up and down stairs     Home Management Training / Self Care:  [] (81832) Provided self-care/home management training related to activities of daily living and compensatory training, and/or use of adaptive equipment for improvement with: ADLs and compensatory training, meal preparation, safety procedures and instruction in use of adaptive equipment, including bathing, grooming, dressing, personal hygiene, basic household cleaning and chores. Home Exercise Program:    [x] (83348) Reviewed/Progressed HEP activities related to strengthening, flexibility, endurance, ROM of   [] LE / Lumbar: core, proximal hip and LE for functional self-care, mobility, lifting and ambulation/stair navigation   [] UE / Cervical: cervical, postural, scapular, scapulothoracic and UE control with self care, reaching, carrying, lifting, house/yardwork, driving, computer work  [] (51629)Reviewed/Progressed HEP activities related to improving balance, coordination, kinesthetic sense, posture, motor skill, proprioception of   [] LE: core, proximal hip and LE for self care, mobility, lifting, and ambulation/stair navigation    [] UE / Cervical: cervical, postural,  scapular, scapulothoracic and UE control with self care, reaching, carrying, lifting, house/yardwork, driving, computer work    Manual Treatments:  PROM / STM / Oscillations-Mobs:  G-I, II, III, IV (PA's, Inf., Post.)  [x] (08174) Provided manual therapy to mobilize LE, proximal hip and/or LS spine soft tissue/joints for the purpose of modulating pain, promoting relaxation,  increasing ROM, reducing/eliminating soft tissue swelling/inflammation/restriction, improving soft tissue extensibility and allowing for proper ROM for normal function with   [] LE / lumbar: self care, mobility, lifting and ambulation.     [] UE / indicated by patients Functional Deficits. []? Progressing: []? Met: []? Not Met: []? Adjusted  3. Patient will demonstrate an increase in right shoulder Strength to 4/5 to allow for proper functional mobility as indicated by patients Functional Deficits. []? Progressing: []? Met: []? Not Met: []? Adjusted  4. Patient will return to functional activities including swimming without increased symptoms or restriction. []? Progressing: []? Met: []? Not Met: []? Adjusted  5. Patient to be able to perform self care /grooming activities with right shoulder w/o symptoms or restriction. []? Progressing: []? Met: []? Not Met: []? Adjusted          Overall Progression Towards Functional goals/ Treatment Progress Update:  [] Patient is progressing as expected towards functional goals listed. [] Progression is slowed due to complexities/Impairments listed. [] Progression has been slowed due to co-morbidities. [x] Plan just implemented, too soon to assess goals progression <30days   [] Goals require adjustment due to lack of progress  [] Patient is not progressing as expected and requires additional follow up with physician  [] Other    Persisting Functional Limitations/Impairments:  [x]Sleeping []Sitting               []Standing []Transfers        []Walking []Kneeling               []Stairs []Squatting / bending   []ADLs []Reaching  []Lifting  []Housework  []Driving []Job related tasks  []Sports/Recreation [x]Other: overhead movement/ self care         ASSESSMENT: 10/26: Patient demonstrated upper trap trigger point tenderness . Patient continues to have difficulty with right Shoulder abduction . End range and pec minor tightness is still issue with manual therapy , however to perform AROM exercises with light resistance. Patient to further benefit from PT to restore functional mobility in right shoulder. 10/22: Patient has made marked improvement with right shoulder AROM/ PROM .  Patient also improved with strength in all planes. End range is still compensated some upper traps requiring frequent cues. The patient will benefit from continuing further PT to reduce muscle tightness and produce efficient end range for ADLs. Treatment/Activity Tolerance:  [] Patient able to complete tx [] Patient limited by fatigue  [] Patient limited by pain  [] Patient limited by other medical complications  [] Other:     Prognosis: [] Good [] Fair  [] Poor    Patient Requires Follow-up: [x] Yes  [] No    Plan for next treatment session: Continue AROM and progressing strengthening     PLAN: updated  PT 2x / week for 4 weeks. [x] Continue per plan of care [] Alter current plan (see comments)  [] Plan of care initiated [] Hold pending MD visit [] Discharge    Electronically signed by: Khushbu Adams PT, DPT    Note: If patient does not return for scheduled/ recommended follow up visits, this note will serve as a discharge from care along with most recent update on progress.

## 2020-10-28 ENCOUNTER — HOSPITAL ENCOUNTER (OUTPATIENT)
Dept: PHYSICAL THERAPY | Age: 60
Setting detail: THERAPIES SERIES
Discharge: HOME | End: 2020-10-28
Payer: MEDICARE

## 2020-10-28 PROCEDURE — 97140 MANUAL THERAPY 1/> REGIONS: CPT

## 2020-10-28 PROCEDURE — 97110 THERAPEUTIC EXERCISES: CPT

## 2020-10-28 NOTE — FLOWSHEET NOTE
[address] [facility]  Phone: [phone]   Fax: [phone]   Physical Therapy Re-Certification Plan of Care        Recommendation:    [x]Continue PT 2x / wk for 4 weeks. []Hold PT, pending MD visit    Physical Therapy Daily Treatment Note  Date:  10/28/2020    Patient Name:  Giovanni Moore    :  1960  MRN: [id number]  Medical/Treatment Diagnosis Information:  · Diagnosis: S/P Right Total shoulder sx  · Treatment Diagnosis: Right shoulder pain , decrease AROM, and strength  Insurance/Certification information:  PT Insurance Information: [facility]  Physician Information:  Referring Practitioner: Vicky Costa MD  Plan of care signed (Y/N): []  Yes [x]  No     Date of Patient follow up with Physician: MARIELA     Progress Report: []  Yes  [x]  No     Date Range for reporting period:  Beginnin20  Ending:10/22/20    Progress report due (10 Rx/or 30 days whichever is less): visit #99     Recertification due (POC duration/ or 90 days whichever is less):     Visit # Insurance Allowable Auth required?  Date Range   +  0/8 Mn []  Yes  [x]  No Na     Latex Allergy:  [x]NO      []YES  Preferred Language for Healthcare:   [x]English       []other:    Functional Scale:   Quick Dash: raw score= 22; dysfunction =25%      Date assessed:  Quick Dash: raw score = 31; dysfunction = 56%  20    Pain level:  0/10     SUBJECTIVE:  Week 10 : 10/26: Patient reports no new complaints   OBJECTIVE:            PROM AROM     L R L R   Cervical Flexion  [facility] WFL   Cervical Extension  [facility] WFL   Cervical Rotation [facility] WFL WFL   Cervical Side-bend [facility] WFL   Shoulder Flexion    130   117 vs 150   Shoulder Abduction    85   74 vs 130   Shoulder External Rotation  47         Strength (0-5) Left Right    Shoulder Shrug (C4) 4+ +4   Shoulder Flex 5 3+   Shoulder Abd (C5) 5 3+   Shoulder ER 5 3+   Shoulder IR 5 3+   Biceps (C6) 5     Triceps (C7) 5     Lats       Middle Trap       Rhomboids       Lower Trap        Pronation  3+ 4   Supination  3+ 4   Wrist Flex (C7) 3+ 4   Wrist Ext (C6) 3+ 4   Wrist Radial Deviation 3+ 4   Wrist Ulnar Deviation 3+ 4             10/8: Lightheaded    9/14: moderate antalgic gait on R foot       RESTRICTIONS/PRECAUTIONS:  Following Total Shoulder protocol ( Junior's) Avoiding IR     Exercises/Interventions:     Therapeutic Exercises (26955) Resistance / level Sets/sec Reps Notes   Pulleys to 90 deg flexion/scap 2  min      Table top : Flexion/abd , circles        Scapular retraction   10x    Mid rows  High rows  Bicep curls-  Bicep brachai  Tricep extension w/ cable column   Shoulder Add Blue /FM #25    Blue/ # 4#    Blue /#10 2  2  2    2  2 10x  10x  10x    10x  10x     Added 9/14   Wand exercises;  t  Supine Flexion/abd  to 120 deg  ER @ 45 deg  Chest press   Supine:  Flexion / abd      5  15             Added 10/8   IR Strap   5/20  Added 10/28   I           [employer] ( Red swiss ball) Flexion    5sec    Added 10/6   Shoulder AROM: using foam roll   Flexion/ abd   Shelf reaching (2nd shelf)    #2 10x    10x Added 10/13   UE [place] Circles  Added 10/15    (Seated) scaption   Added 10/6 emphasis to reduce upper trap compensation    UBE  2/2 fwd/bwd      Therapeutic Activities (96799)       Assessment                                    Neuromuscular Re-ed (69664)                                                 Manual Intervention (57033)       PROM all planes, G2 mobs infer/pos, STM to pec minor , STM upper trap  12 min                                            Pt. Education:  -patient educated on diagnosis, prognosis and expectations for rehab  -all patient questions were answered    Home Exercise Program:   Access Code: [email or web address]   URL: [email or web address]   Date: 09/08/2020   Prepared by: Hermelindo Ashton     Exercises   Seated Shoulder Flexion Slide at Table Top with Forearm in Neutral - 10 reps - 3 sets - 3 hold - 1x daily - 7x weekly         Therapeutic Exercise and NMR EXR  [] (61156) Provided verbal/tactile cueing for activities related to strengthening, flexibility, endurance, ROM for improvements in  [] LE / Lumbar: LE, proximal hip, and core control with self care, mobility, lifting, ambulation. [] UE / Cervical: cervical, postural, scapular, scapulothoracic and UE control with self care, reaching, carrying, lifting, house/yardwork, driving, computer work.  [] (93240) Provided verbal/tactile cueing for activities related to improving balance, coordination, kinesthetic sense, posture, motor skill, proprioception to assist with   [] LE / lumbar: LE, proximal hip, and core control in self care, mobility, lifting, ambulation and eccentric single leg control. [] UE / cervical: cervical, scapular, scapulothoracic and UE control with self care, reaching, carrying, lifting, house/yardwork, driving, computer work.   [] (31967) Therapist is in constant attendance of 2 or more patients providing skilled therapy interventions, but not providing any significant amount of measurable one-on-one time to either patient, for improvements in  [] LE / lumbar: LE, proximal hip, and core control in self care, mobility, lifting, ambulation and eccentric single leg control. [] UE / cervical: cervical, scapular, scapulothoracic and UE control with self care, reaching, carrying, lifting, house/yardwork, driving, computer work.      NMR and Therapeutic Activities:    [] (19343 or 43282) Provided verbal/tactile cueing for activities related to improving balance, coordination, kinesthetic sense, posture, motor skill, proprioception and motor activation to allow for proper function of   [] LE: / Lumbar core, proximal hip and LE with self care and ADLs  [] UE / Cervical: cervical, postural, scapular, scapulothoracic and UE control with self care, carrying, lifting, driving, computer work.   [] (75368) Gait Re-education- Provided training and instruction to the patient for proper LE, core and proximal hip recruitment and positioning and eccentric body weight control with ambulation re-education including up and down stairs     Home Management Training / Self Care:  [] (04884) Provided self-care/home management training related to activities of daily living and compensatory training, and/or use of adaptive equipment for improvement with: ADLs and compensatory training, meal preparation, safety procedures and instruction in use of adaptive equipment, including bathing, grooming, dressing, personal hygiene, basic household cleaning and chores. Home Exercise Program:    [x] (44577) Reviewed/Progressed HEP activities related to strengthening, flexibility, endurance, ROM of   [] LE / Lumbar: core, proximal hip and LE for functional self-care, mobility, lifting and ambulation/stair navigation   [] UE / Cervical: cervical, postural, scapular, scapulothoracic and UE control with self care, reaching, carrying, lifting, house/yardwork, driving, computer work  [] (66729)Reviewed/Progressed HEP activities related to improving balance, coordination, kinesthetic sense, posture, motor skill, proprioception of   [] LE: core, proximal hip and LE for self care, mobility, lifting, and ambulation/stair navigation    [] UE / Cervical: cervical, postural,  scapular, scapulothoracic and UE control with self care, reaching, carrying, lifting, house/yardwork, driving, computer work    Manual Treatments:  PROM / STM / Oscillations-Mobs:  G-I, II, III, IV (PA's, Inf., Post.)  [x] (86847) Provided manual therapy to mobilize LE, proximal hip and/or LS spine soft tissue/joints for the purpose of modulating pain, promoting relaxation,  increasing ROM, reducing/eliminating soft tissue swelling/inflammation/restriction, improving soft tissue extensibility and allowing for proper ROM for normal function with   [] LE / lumbar: self care, mobility, lifting and ambulation.     [] UE / Cervical: self care, reaching, carrying, lifting, house/yardwork, driving, computer work. Modalities:  [] (19642) Vasopneumatic compression: Utilized vasopneumatic compression to decrease edema / swelling for the purpose of improving mobility and quad tone / recruitment which will allow for increased overall function including but not limited to self-care, transfers, ambulation, and ascending / descending stairs. Modalities: 9/8, 9/10, 9/15, 9/23, 10/6, 10/8, 10/13, 10/15, 10/20, 10/22, 10/26, 10/28 CP to Right shoulder supine with bolster underneath BLEs    Charges:  Timed Code Treatment Minutes: 45   Total Treatment Minutes: 55     [] EVAL - LOW (21665)   [] EVAL - MOD (41471)  [] EVAL - HIGH (18787)  [] RE-EVAL (19892)  [x] WP(25057) x  2     [] Ionto  [] NMR (10971) x       [] Vaso  [x] Manual (87591) x 1     [] Ultrasound  [] TA x         [] Mech Traction (51732)  [] Aquatic Therapy x     [] ES (un) (40491):   [] Home Management Training x  [] ES(attended) (37741)   [] Dry Needling 1-2 muscles (15909):  [] Dry Needling 3+ muscles (520043)  [] Group:      [] Other:     GOALS:  Patient stated goal: To get back to swimming   []? Progressing: []? Met: []? Not Met: []? Adjusted     Therapist goals for Patient:   Short Term Goals: To be achieved in: 2 weeks  1. Independent in HEP and progression per patient tolerance, in order to prevent re-injury. []? Progressing: []? Met: []? Not Met: []? Adjusted  2. Patient will have a decrease in pain to facilitate improvement in movement, function, and ADLs as indicated by Functional Deficits. []? Progressing: []? Met: []? Not Met: []? Adjusted     Long Term Goals: To be achieved in: 6 weeks/ DC   1. Disability index score of 30% or less for the  Quick DASH to assist with reaching prior level of function. []? Progressing: []? Met: []? Not Met: []? Adjusted  2.  Patient will demonstrate increased AROM to right shoulder by 15 deg to allow for proper joint functioning as indicated by patients Functional Deficits. []? Progressing: []? Met: []? Not Met: []? Adjusted  3. Patient will demonstrate an increase in right shoulder Strength to 4/5 to allow for proper functional mobility as indicated by patients Functional Deficits. []? Progressing: []? Met: []? Not Met: []? Adjusted  4. Patient will return to functional activities including swimming without increased symptoms or restriction. []? Progressing: []? Met: []? Not Met: []? Adjusted  5. Patient to be able to perform self care /grooming activities with right shoulder w/o symptoms or restriction. []? Progressing: []? Met: []? Not Met: []? Adjusted          Overall Progression Towards Functional goals/ Treatment Progress Update:  [] Patient is progressing as expected towards functional goals listed. [] Progression is slowed due to complexities/Impairments listed. [] Progression has been slowed due to co-morbidities. [x] Plan just implemented, too soon to assess goals progression <30days   [] Goals require adjustment due to lack of progress  [] Patient is not progressing as expected and requires additional follow up with physician  [] Other    Persisting Functional Limitations/Impairments:  [x]Sleeping []Sitting               []Standing []Transfers        []Walking []Kneeling               []Stairs []Squatting / bending   []ADLs []Reaching  []Lifting  []Housework  []Driving []Job related tasks  []Sports/Recreation [x]Other: overhead movement/ self care         ASSESSMENT:  10/28: The patient continues have upper trap tightness and restriction right shoulder abduction. Patient tolerated add IR strap stretch. Shoulder elevation greater than 90 deg continues to be struggle due to capsule tightness and muscle weakness. Patient to further benefit from PT to further improve right shoulder AROM. 10/26: Patient demonstrated upper trap trigger point tenderness .  Patient continues to have difficulty with right

## 2020-11-04 ENCOUNTER — HOSPITAL ENCOUNTER (OUTPATIENT)
Dept: PHYSICAL THERAPY | Age: 60
Setting detail: THERAPIES SERIES
Discharge: HOME | End: 2020-11-04
Payer: MEDICARE

## 2020-11-04 PROCEDURE — 97140 MANUAL THERAPY 1/> REGIONS: CPT

## 2020-11-04 PROCEDURE — 97110 THERAPEUTIC EXERCISES: CPT

## 2020-11-04 NOTE — FLOWSHEET NOTE
[address] [facility]  Phone: [phone]   Fax: [phone]   Physical Therapy Re-Certification Plan of Care        Recommendation:    [x]Continue PT 2x / wk for 4 weeks. []Hold PT, pending MD visit    Physical Therapy Daily Treatment Note  Date:  2020    Patient Name:  Theodore Anaya    :  1960  MRN: [id number]  Medical/Treatment Diagnosis Information:  · Diagnosis: S/P Right Total shoulder sx  · Treatment Diagnosis: Right shoulder pain , decrease AROM, and strength  Insurance/Certification information:  PT Insurance Information: [employer] [facility] [facility] [place]  Physician Information:  Referring Practitioner: Baljeet Devi MD  Plan of care signed (Y/N): []  Yes [x]  No     Date of Patient follow up with Physician: MARIELA     Progress Report: []  Yes  [x]  No     Date Range for reporting period:  Beginnin20  Ending:10/22/20    Progress report due (10 Rx/or 30 days whichever is less): visit #35     Recertification due (POC duration/ or 90 days whichever is less):     Visit # Insurance Allowable Auth required? Date Range   +   Mn []  Yes  [x]  No Na     Latex Allergy:  [x]NO      []YES  Preferred Language for Healthcare:   [x]English       []other:    Functional Scale:   Quick Dash: raw score= 22; dysfunction =25%      Date assessed:  Quick Dash: raw score = 31; dysfunction = 56%  20    Pain level:  0/10     SUBJECTIVE:  Week 10 : : Patient has no new complaints pain in right shoulder.        OBJECTIVE:            PROM AROM     L R L R   Cervical Flexion  [facility]   Cervical Extension  [facility] WFL   Cervical Rotation [facility] WFL WFL   [facility]end [facility]FL   Shoulder Flexion    130   117 vs 150   Shoulder Abduction    85   74 vs 130   Shoulder External Rotation  47         Strength (0-5) Left Right    Shoulder Shrug (C4) 4+ +4   Shoulder Flex 5 3+   Shoulder Abd (C5) 5 3+   Shoulder ER 5 3+   Shoulder IR 5 3+   Biceps (C6) 5     Triceps (C7) 5     Lats       Middle Trap       Rhomboids       Lower Trap        Pronation  3+ 4   Supination  3+ 4   Wrist Flex (C7) 3+ 4   Wrist Ext (C6) 3+ 4   Wrist Radial Deviation 3+ 4   Wrist Ulnar Deviation 3+ 4             10/8: Lightheaded    9/14: moderate antalgic gait on R foot       RESTRICTIONS/PRECAUTIONS:  Following Total Shoulder protocol ( Junior's) Avoiding IR     Exercises/Interventions:     Therapeutic Exercises (73418) Resistance / level Sets/sec Reps Notes   Pulleys to 90 deg flexion/scap 2  min      Table top : Flexion/abd , circles        Scapular retraction   10x    Mid rows  High rows  Bicep curls-    Tricep extension w/    Shoulder Add Blue /FM #25      4#    [place] #4 2  2  2    2  2 10x  10x  10x    10x  10x     Added 9/14   Wand exercises;  t  Supine Flexion/abd  to 120 deg  ER @ 45 deg  Chest press   Supine:  Flexion / abd      5  15             Added 10/8   IR Strap   5/10  Added 10/28   I           [employer] ( Red swiss ball) Flexion    5sec    Added 10/6   Shoulder AROM: using foam roll   Flexion/ abd   Shelf reaching (2nd shelf)    #3 10x    10x Added 10/13   UE [place] Circles  Added 10/15    (Seated) scaption   Added 10/6 emphasis to reduce upper trap compensation    UBE  2/2 fwd/bwd      Therapeutic Activities (21629)       Assessment                                    Neuromuscular Re-ed (75592)                                                 Manual Intervention (60372)       PROM all planes, G2 mobs infer/pos, STM to pec minor , STM upper trap  12 min                                            Pt. Education:  -patient educated on diagnosis, prognosis and expectations for rehab  -all patient questions were answered    Home Exercise Program:   Access Code: [email or web address]   URL: [email or web address]   Date: 09/08/2020   Prepared by: Jayshree Show     Exercises   Seated Shoulder Flexion Slide at Table Top with Forearm in Neutral - 10 reps - 3 sets - 3 hold - 1x daily - 7x weekly         Therapeutic Exercise and NMR EXR  [] (07926) Provided verbal/tactile cueing for activities related to strengthening, flexibility, endurance, ROM for improvements in  [] LE / Lumbar: LE, proximal hip, and core control with self care, mobility, lifting, ambulation. [] UE / Cervical: cervical, postural, scapular, scapulothoracic and UE control with self care, reaching, carrying, lifting, house/yardwork, driving, computer work.  [] (63801) Provided verbal/tactile cueing for activities related to improving balance, coordination, kinesthetic sense, posture, motor skill, proprioception to assist with   [] LE / lumbar: LE, proximal hip, and core control in self care, mobility, lifting, ambulation and eccentric single leg control. [] UE / cervical: cervical, scapular, scapulothoracic and UE control with self care, reaching, carrying, lifting, house/yardwork, driving, computer work.   [] (90827) Therapist is in constant attendance of 2 or more patients providing skilled therapy interventions, but not providing any significant amount of measurable one-on-one time to either patient, for improvements in  [] LE / lumbar: LE, proximal hip, and core control in self care, mobility, lifting, ambulation and eccentric single leg control. [] UE / cervical: cervical, scapular, scapulothoracic and UE control with self care, reaching, carrying, lifting, house/yardwork, driving, computer work.      NMR and Therapeutic Activities:    [] (50225 or 76043) Provided verbal/tactile cueing for activities related to improving balance, coordination, kinesthetic sense, posture, motor skill, proprioception and motor activation to allow for proper function of   [] LE: / Lumbar core, proximal hip and LE with self care and ADLs  [] UE / Cervical: cervical, postural, scapular, scapulothoracic and UE control with self care, carrying, lifting, driving, computer work.   [] (19835) Gait Re-education- Provided training Cervical: self care, reaching, carrying, lifting, house/yardwork, driving, computer work. Modalities:  [] (31617) Vasopneumatic compression: Utilized vasopneumatic compression to decrease edema / swelling for the purpose of improving mobility and quad tone / recruitment which will allow for increased overall function including but not limited to self-care, transfers, ambulation, and ascending / descending stairs. Modalities: 9/8, 9/10, 9/15, 9/23, 10/6, 10/8, 10/13, 10/15, 10/20, 10/22, 10/26, 10/28, 11/4 CP to Right shoulder supine with bolster underneath BLEs    Charges:  Timed Code Treatment Minutes: 40   Total Treatment Minutes: 51     [] EVAL - LOW (50114)   [] EVAL - MOD (39527)  [] EVAL - HIGH (80934)  [] RE-EVAL (50582)  [x] CL(12121) x  2     [] Ionto  [] NMR (54175) x       [] Vaso  [x] Manual (72842) x 1     [] Ultrasound  [] TA x         [] Mech Traction (86601)  [] Aquatic Therapy x     [] ES (un) (79292):   [] Home Management Training x  [] ES(attended) (90268)   [] Dry Needling 1-2 muscles (00485):  [] Dry Needling 3+ muscles (701641)  [] Group:      [] Other:     GOALS:  Patient stated goal: To get back to swimming   []? Progressing: []? Met: []? Not Met: []? Adjusted     Therapist goals for Patient:   Short Term Goals: To be achieved in: 2 weeks  1. Independent in HEP and progression per patient tolerance, in order to prevent re-injury. []? Progressing: []? Met: []? Not Met: []? Adjusted  2. Patient will have a decrease in pain to facilitate improvement in movement, function, and ADLs as indicated by Functional Deficits. []? Progressing: []? Met: []? Not Met: []? Adjusted     Long Term Goals: To be achieved in: 6 weeks/ DC   1. Disability index score of 30% or less for the  Quick DASH to assist with reaching prior level of function. []? Progressing: []? Met: []? Not Met: []? Adjusted  2.  Patient will demonstrate increased AROM to right shoulder by 15 deg to allow for proper joint functioning as indicated by patients Functional Deficits. []? Progressing: []? Met: []? Not Met: []? Adjusted  3. Patient will demonstrate an increase in right shoulder Strength to 4/5 to allow for proper functional mobility as indicated by patients Functional Deficits. []? Progressing: []? Met: []? Not Met: []? Adjusted  4. Patient will return to functional activities including swimming without increased symptoms or restriction. []? Progressing: []? Met: []? Not Met: []? Adjusted  5. Patient to be able to perform self care /grooming activities with right shoulder w/o symptoms or restriction. []? Progressing: []? Met: []? Not Met: []? Adjusted          Overall Progression Towards Functional goals/ Treatment Progress Update:  [] Patient is progressing as expected towards functional goals listed. [] Progression is slowed due to complexities/Impairments listed. [] Progression has been slowed due to co-morbidities. [x] Plan just implemented, too soon to assess goals progression <30days   [] Goals require adjustment due to lack of progress  [] Patient is not progressing as expected and requires additional follow up with physician  [] Other    Persisting Functional Limitations/Impairments:  [x]Sleeping []Sitting               []Standing []Transfers        []Walking []Kneeling               []Stairs []Squatting / bending   []ADLs []Reaching  []Lifting  []Housework  []Driving []Job related tasks  []Sports/Recreation [x]Other: overhead movement/ self care         ASSESSMENT:  11/4: The patient tolerated increase resistance with shoulder flex/abd with mild to moderate substitution in elevation of right shoulder. She demonstrated increase PROM in all planes after Grade 3-4 mobs. Patient will continue to benefit from further AROM and strengthening of right shoulder to restore near normal function. 10/28: The patient continues have upper trap tightness and restriction right shoulder abduction.  Patient tolerated add IR strap stretch. Shoulder elevation greater than 90 deg continues to be struggle due to capsule tightness and muscle weakness. Patient to further benefit from PT to further improve right shoulder AROM. Treatment/Activity Tolerance:  [] Patient able to complete tx [] Patient limited by fatigue  [] Patient limited by pain  [] Patient limited by other medical complications  [] Other:     Prognosis: [] Good [] Fair  [] Poor    Patient Requires Follow-up: [x] Yes  [] No    Plan for next treatment session: Continue AROM and progressing strengthening     PLAN: updated  PT 2x / week for 4 weeks. [x] Continue per plan of care [] Alter current plan (see comments)  [] Plan of care initiated [] Hold pending MD visit [] Discharge    Electronically signed by: Sridhar Pantoja PT, DPT    Note: If patient does not return for scheduled/ recommended follow up visits, this note will serve as a discharge from care along with most recent update on progress.

## 2020-11-05 ENCOUNTER — OFFICE VISIT (OUTPATIENT)
Dept: CARDIOLOGY CLINIC | Age: 60
End: 2020-11-05
Payer: MEDICARE

## 2020-11-05 VITALS
DIASTOLIC BLOOD PRESSURE: 88 MMHG | WEIGHT: 271.5 LBS | OXYGEN SATURATION: 95 % | BODY MASS INDEX: 48.11 KG/M2 | HEIGHT: 63 IN | HEART RATE: 60 BPM | SYSTOLIC BLOOD PRESSURE: 134 MMHG

## 2020-11-05 PROCEDURE — 1036F TOBACCO NON-USER: CPT | Performed by: NURSE PRACTITIONER

## 2020-11-05 PROCEDURE — G8427 DOCREV CUR MEDS BY ELIG CLIN: HCPCS | Performed by: NURSE PRACTITIONER

## 2020-11-05 PROCEDURE — G8484 FLU IMMUNIZE NO ADMIN: HCPCS | Performed by: NURSE PRACTITIONER

## 2020-11-05 PROCEDURE — 99214 OFFICE O/P EST MOD 30 MIN: CPT | Performed by: NURSE PRACTITIONER

## 2020-11-05 PROCEDURE — G8417 CALC BMI ABV UP PARAM F/U: HCPCS | Performed by: NURSE PRACTITIONER

## 2020-11-05 PROCEDURE — 3017F COLORECTAL CA SCREEN DOC REV: CPT | Performed by: NURSE PRACTITIONER

## 2020-11-05 PROCEDURE — 93000 ELECTROCARDIOGRAM COMPLETE: CPT | Performed by: NURSE PRACTITIONER

## 2020-11-05 RX ORDER — METOPROLOL SUCCINATE 25 MG/1
12.5 TABLET, EXTENDED RELEASE ORAL DAILY
Qty: 180 TABLET | Refills: 0
Start: 2020-11-05 | End: 2021-02-04 | Stop reason: SDUPTHER

## 2020-11-05 RX ORDER — MONTELUKAST SODIUM 10 MG/1
10 TABLET ORAL NIGHTLY
Qty: 90 TABLET | Refills: 0 | COMMUNITY
Start: 2020-11-05 | End: 2020-11-11 | Stop reason: SDUPTHER

## 2020-11-05 NOTE — PATIENT INSTRUCTIONS
- Cut back metoprolol to 25 mg daily for 1 week and then 12.5 mg daily after that, OK to stop to see if fatigue improves  - Check your blood pressure at home, if your top number blood pressure is >140/90 or <90/50 please call the office  - Call office if symptoms develop  - Follow up in 3 months with Dr. Heri Gutierrez

## 2020-11-05 NOTE — PROGRESS NOTES
[address]   Electrophysiology  JOSE Stovall-CNP  Attending EP: Dr. Katherin Sims  Date: 11/5/2020  I had the privilege of visiting Carlos Zuñiga in the office. Chief Complaint:   Chief Complaint   Patient presents with    Atrial Fibrillation     History of Present Illness: History obtained from patient and medical record. Carlos Zuñiga is 61 y.o. female with a past medical history of HTN, HLD, fibromyalgia, TONY, asthma, and atrial fibrillation. Wore a monitor that showed 25% AF burden and elected for ablation. S/p RFCA of afib w/ PVI and typical flutter (10/2/2020). Interval history: Today, Carlos Zuñiga is being seen for follow up for afib and HTN. Denies pain or swelling to right groin. Has been back to baseline since ablation. Reports one brief episode of AF post ablation, but has not had any symptoms wince the first week post op. Admits to fatigue that seems to have started in July when she started metoprolol. Neurontin was reduced with no effect. This limits her activity and she often needs naps around 4 pm every evening. She monitors HR at home and has been in the 50's since starting metoprolol. She has TONY, follows yearly with sleep medicine and recently had her mask changed and she has not been tolerating. For several months she was not wearing CPAP at all. BP has been controlled with systolic BP less that 709'G at home. Denies CP, SOB, dizziness, or edema. Denies having chest pain, palpitations, shortness of breath, orthopnea/PND, cough, or dizziness at the time of this visit. With regard to medication therapy the patient has been compliant with prescribed regimen. She has not tolerated therapy to date. Allergies: Allergies   Allergen Reactions    Latex Rash    Statins Nausea Only     Home Medications:  Prior to Visit Medications    Medication Sig Taking?  Authorizing Provider   pantoprazole (PROTONIX) 40 MG tablet Take 1 tablet by mouth every morning (before breakfast)  Patient not taking: Reported on 10/8/2020  Juan Johansen MD   DULoxetine (CYMBALTA) 60 MG extended release capsule Take 1 capsule by mouth daily  Camacho Quintero MD   montelukast (SINGULAIR) 10 MG tablet Take 1 tablet by mouth nightly  JOSE Samuels CNP   metoprolol succinate (TOPROL XL) 25 MG extended release tablet Take 1 tablet by mouth 2 times daily  JOSE Samuels CNP   levothyroxine (SYNTHROID) 112 MCG tablet Take 1 tablet by mouth daily  JOSE Samuels CNP   colestipol (COLESTID) 1 g tablet Take 1 tablet by mouth 2 times daily  JOSE Samuels CNP   vitamin D (ERGOCALCIFEROL) 1.25 MG (84526 UT) CAPS capsule Take 1 capsule by mouth every 14 days  Camacho Quitnero MD   gabapentin (NEURONTIN) 300 MG capsule Take 2 capsules by mouth 3 times daily for 90 days. Patient taking differently: Take 600 mg by mouth 2 times daily. Camacho Quintero MD   rivaroxaban (XARELTO) 20 MG TABS tablet Take 1 tablet by mouth Daily with supper  Juan Johansen MD   cetirizine (ZYRTEC) 10 MG tablet Take 10 mg by mouth daily  Historical Provider, MD   albuterol sulfate  (90 Base) MCG/ACT inhaler Inhale 2 puffs into the lungs 4 times daily  JOSE Jerry NP   budesonide-formoterol (SYMBICORT) 160-4.5 MCG/ACT AERO Inhale 2 puffs into the lungs 2 times daily  Kofi Cordova MD   acetaminophen (TYLENOL 8 HOUR) 650 MG extended release tablet Take 650 mg by mouth every 8 hours as needed for Pain  Historical Provider, MD      Past Medical History:  Past Medical History:   Diagnosis Date    A-fib ([facility] [address]     Anxiety     Asthma     Back pain     Depression     Fibromyalgia     Hyperlipidemia     Hypertension     Migraines     Prediabetes     Unspecified sleep apnea     Urinary incontinence      Past Surgical History:    has a past surgical history that includes Hysterectomy; Tubal ligation;  Tonsillectomy (1968); knee surgery (Left, 2017); Colonoscopy (N/A, 11/27/2019); Breast enhancement surgery; and cyst removal (1960). Social History:  Reviewed. reports that she has never smoked. She has never used smokeless tobacco. She reports current alcohol use. She reports that she does not use drugs. Family History:  Reviewed. family history includes Arthritis in her father and mother; Breast Cancer in her maternal grandmother; COPD in her brother, father, and mother; Cancer in her father, maternal uncle, mother, and other family members; Diabetes in her mother; Glaucoma in her maternal grandmother; Heart Disease in her mother; Ovarian Cancer in her mother; Stroke in her brother and mother. Denies family history of sudden cardiac death, arrhythmia, premature CAD    Review of System:  · Constitutional: No fevers, chills, weight changes or weakness + fatigue  · HEENT: No visual changes. No mouth sores or sore throat. · Cardiovascular: denies chest pain, denies dyspnea on exertion, denies palpitations or denies loss of consciousness. No cough, hemoptysis, denies pleuritic pain, or phlebitis. denies dizziness. · Respiratory: denies cough or wheezing. No hematemesis. · Gastrointestinal: No abdominal pain, blood in stools. · Genitourinary: No dysuria, urgency or hematuria. · Musculoskeletal: denies gait disturbance, No focal muscle weakness. · Integumentary: No rash or pruritis. · Neurological: No headache, change in muscle strength, numbness or tingling. · Psychiatric: No confusion, anxiety, or depression. · Endocrine: No temperature intolerance. No excessive thirst, fluid intake, or urination. · Hem/Lymph: Denies abnormal bruising or bleeding. Physical Examination:  There were no vitals filed for this visit.    Wt Readings from Last 3 Encounters:   10/08/20 268 lb 1.9 oz (121.6 kg)   10/03/20 276 lb 10.8 oz (125.5 kg)   08/11/20 266 lb 6.4 oz (120.8 kg)      Constitutional: Cooperative and in no apparent distress, CPAP  - Call office if symptoms worseing  - Can consider diltiazem as alternative if tachycardia or AF recurrent    F/U: Follow-up with EP in 3 months MXA  -Follow up with device clinic as scheduled  -Call [facility]  at [phone] with any questions    Diet & Exercise:   The patient is counseled to follow a low salt diet to assure blood pressure remains controlled for cardiovascular risk factor modification   The patient is counseled to avoid excess caffeine, and energy drinks as this may exacerbated ectopy and arrhythmia   The patient is counseled to lose weight to control cardiovascular risk factors   Exercise program discussed: To improve overall cardiovascular health, the patient is instructed to increase cardiovascular related activities with a goal of 150 min/week of moderate level activity or 10,000 steps per day. Encouraged to perform as much activity as tolerated     I have addressed the patient's cardiac risk factors and adjusted pharmacologic treatment as needed. In addition, I have reinforced the need for patient directed risk factor modification. I independently reviewed the ECG    All questions and concerns were addressed with the patient. Alternatives to treatment were discussed. Thank you for allowing to us to participate in the care of April Villa.     JOSE Cunningham-CNP  [address]    Office: [phone]

## 2020-11-06 ENCOUNTER — OFFICE VISIT (OUTPATIENT)
Dept: PSYCHOLOGY | Age: 60
End: 2020-11-06
Payer: MEDICARE

## 2020-11-06 ENCOUNTER — OFFICE VISIT (OUTPATIENT)
Dept: ORTHOPEDIC SURGERY | Age: 60
End: 2020-11-06
Payer: MEDICARE

## 2020-11-06 ENCOUNTER — APPOINTMENT (OUTPATIENT)
Dept: PHYSICAL THERAPY | Age: 60
End: 2020-11-06
Payer: MEDICARE

## 2020-11-06 VITALS — WEIGHT: 266 LBS | TEMPERATURE: 96.4 F | HEIGHT: 63 IN | BODY MASS INDEX: 47.13 KG/M2

## 2020-11-06 PROCEDURE — G8427 DOCREV CUR MEDS BY ELIG CLIN: HCPCS | Performed by: PHYSICAL MEDICINE & REHABILITATION

## 2020-11-06 PROCEDURE — G8417 CALC BMI ABV UP PARAM F/U: HCPCS | Performed by: PHYSICAL MEDICINE & REHABILITATION

## 2020-11-06 PROCEDURE — G8484 FLU IMMUNIZE NO ADMIN: HCPCS | Performed by: PHYSICAL MEDICINE & REHABILITATION

## 2020-11-06 PROCEDURE — 99203 OFFICE O/P NEW LOW 30 MIN: CPT | Performed by: PHYSICAL MEDICINE & REHABILITATION

## 2020-11-06 PROCEDURE — 90791 PSYCH DIAGNOSTIC EVALUATION: CPT | Performed by: PSYCHOLOGIST

## 2020-11-06 NOTE — PROGRESS NOTES
New Patient: SPINE    Referring Provider:  Machelle Walker DPM    Chief Complaint   Patient presents with    New Patient     LSP        HISTORY OF PRESENT ILLNESS:      · The patient is being sent at the request of Machelle Walker DPM in consultation as a new spine patient for low back pain The patient is a 61 y.o. female whom reports she developed severe low back pain on 11/4/2020, but has had chronic pain since 2013. She is in PT for a recent TSA. She is being treated for neuropathy in her feet. She has sensitivity in her feet. She has tried exercising for her back pain. She has not had any recent imaging of the lumbar spine. Pain Assessment  Location of Pain: Back  Location Modifiers: Posterior, Inferior  Severity of Pain: 5  Quality of Pain: Aching, Sharp, Dull, Other (Comment)(numbness, stabbing, tingling)  Duration of Pain: Persistent  Frequency of Pain: Constant  Aggravating Factors: Walking, Standing, Exercise  Limiting Behavior: Yes  Relieving Factors: Other (Comment)(n/a)  Result of Injury: No  Work-Related Injury: No  Are there other pain locations you wish to document?: No      Associated signs and symptoms:   Neurogenic bowel or bladder symptoms:  no   Perceived weakness:  yes   Difficulty walking:  yes    Recent Imaging (within past one year)   Xrays: no   MRI or CT of spine: no    Current/Past Treatment:   · Physical Therapy:  yes  · Chiropractic:  none  · Injection:  none  · Medications:   NSAIDS:  none   Muscle relaxer:  none   Steriods:  none   Neuropathic medications:yes   Opioids:  none  · Previous surgery:  no  · Previous surgical consult:  no        Past medical, surgical, social and family history reviewed with the patient.  No pertinent relevant history            Past Medical History:   Past Medical History:   Diagnosis Date    [facility]fib ([facility] [address]     Anxiety     Asthma     Back pain     Depression     Fibromyalgia     Hyperlipidemia     Hypertension     Migraines     Prediabetes  Unspecified sleep apnea     Urinary incontinence       Past Surgical History:     Past Surgical History:   Procedure Laterality Date    BREAST ENHANCEMENT SURGERY      COLONOSCOPY N/A 11/27/2019    COLONOSCOPY POLYPECTOMY SNARE/COLD BIOPSY [facility] [place]    TONSILLECTOMY  1968    TUBAL LIGATION       Current Medications:     Current Outpatient Medications:     montelukast (SINGULAIR) 10 MG tablet, Take 1 tablet by mouth nightly, Disp: 90 tablet, Rfl: 0    rivaroxaban (XARELTO) 20 MG TABS tablet, Take 1 tablet by mouth Daily with supper, Disp: 90 tablet, Rfl: 1    metoprolol succinate (TOPROL XL) 25 MG extended release tablet, Take 0.5 tablets by mouth daily, Disp: 180 tablet, Rfl: 0    DULoxetine (CYMBALTA) 60 MG extended release capsule, Take 1 capsule by mouth daily, Disp: 90 capsule, Rfl: 0    levothyroxine (SYNTHROID) 112 MCG tablet, Take 1 tablet by mouth daily, Disp: 90 tablet, Rfl: 0    colestipol (COLESTID) 1 g tablet, Take 1 tablet by mouth 2 times daily, Disp: 180 tablet, Rfl: 0    vitamin D (ERGOCALCIFEROL) 1.25 MG (39906 UT) CAPS capsule, Take 1 capsule by mouth every 14 days, Disp: 6 capsule, Rfl: 1    cetirizine (ZYRTEC) 10 MG tablet, Take 10 mg by mouth daily, Disp: , Rfl:     acetaminophen (TYLENOL 8 HOUR) 650 MG extended release tablet, Take 650 mg by mouth every 8 hours as needed for Pain, Disp: , Rfl:     gabapentin (NEURONTIN) 300 MG capsule, Take 2 capsules by mouth 3 times daily for 90 days. (Patient taking differently: Take 600 mg by mouth 2 times daily. ), Disp: 540 capsule, Rfl: 0  Allergies:  Latex and Statins  Social History:    reports that she has never smoked. She has never used smokeless tobacco. She reports current alcohol use. She reports that she does not use drugs.   Family History:   Family History   Problem Relation Age of Onset    Cancer Other     Cancer Other     Cancer Maternal Uncle         melanoma    Arthritis Mother     Diabetes Mother     Heart Disease Mother         a-fib    COPD Mother     Cancer Mother         skin    Ovarian Cancer Mother     Stroke Mother     COPD Father     Arthritis Father     Cancer Father         lung, prostate    COPD Brother     Stroke Brother     Glaucoma Maternal Grandmother     Breast Cancer Maternal Grandmother         breast    Heart Attack Neg Hx        REVIEW OF SYSTEMS: ROS - 14 point    Constitutional: No fevers, chills, night sweats, unexplained weight loss  Eye: No vision changes or diplopia  ENT: No nasal congestion, postnasal drip or sore throat. No tinnitus  Respiratory: No cough or SOB  CV: No chest pain or palpitations  GI: No nausea, abdominal pain, stool changes  : No dysuria or hematuria  Skin: No new or changing skin lesions, no rashes  MSK: No joint swelling, morning stiffness, unusual joint pain  Neurological: No headache, confusion, syncope  Psychiatric: No excessive anxiety or depression  Endocrine: No polyuria or polydipsia  Hematologic: No lymph node enlargement or excessive bleeding  Immunologic:No history of immune deficiency or immunomodulating drugs           PHYSICAL EXAM:    Vitals: Temperature 96.4 °F (35.8 °C), temperature source Infrared, height 5' 3\" (1.6 m), weight 266 lb (120.7 kg), not currently breastfeeding. GENERAL EXAM:  · General Apparence: Patient is adequately groomed with no evidence of malnutrition. · Psychiatric: Orientation: The patient is oriented to time, place and person. The patient's mood and affect are appropriate   · Vascular: Examination reveals no swelling and palpation reveals no tenderness in upper or lower extremities. Good capillary refill.    · The lymphatic examination of the neck, axillae and groin reveals all areas to be without enlargement or induration   Sensation is intact without deficit in the upper and lower extremities to light touch except limited below the knees. · Coordination of the upper and lower extremities are normal.    CERVICAL EXAMINATION:  · Inspection: Local inspection shows no step-off or bruising. Cervical alignment is normal. No instability is noted. · Palpation and Percussion: No evidence of tenderness at the midline. Paraspinal tenderness is present. There is no paraspinal spasm. · Range of Motion:  limited by 25% in all planes due to pain   · Strength: 5/5 bilateral upper extremities  · Skin:There are no rashes, ulcerations or lesions. · Reflexes: Bilaterally triceps, biceps and brachioradialis are 1+. Clonus absent bilaterally at the feet. No pathological reflexes are noted. · Gait & station:  normal, patient ambulates without assistance and no ataxia  · Additional Examinations:  · RIGHT UPPER EXTREMITY:  Inspection/examination of the right upper extremity does not show any tenderness, deformity or injury. Range of motion is normal and pain-free. There is no gross instability. There are no rashes, ulcerations or lesions. Strength and tone are normal. No atrophy or abnormal movements are noted. · LEFT UPPER EXTREMITY: Inspection/examination of the left upper extremity does not show any tenderness, deformity or injury. Range of motion is normal and pain-free. There is no gross instability. There are no rashes, ulcerations or lesions. Strength and tone are normal. No atrophy or abnormal movements are noted. LUMBAR/SACRAL EXAMINATION:  · Inspection: Local inspection shows no step-off or bruising. Lumbar alignment is normal. No instability is noted. · Palpation:   No evidence of tenderness at the midline. Lumbar paraspinal tenderness Mild L4/5 and L5/S1 tenderness  Bursal tenderness No tenderness bilaterally  There is no paraspinal spasm. · Range of Motion: limited by 50% in all planes due to pain  · Strength:   Strength testing is 5/5 in all muscle groups tested.   · Special Tests:   Straight leg raise and Alb 4.3 3.4 - 5.0 g/dL    Albumin/Globulin Ratio 1.8 1.1 - 2.2    Total Bilirubin 0.4 0.0 - 1.0 mg/dL    Alkaline Phosphatase 103 40 - 129 U/L    ALT 21 10 - 40 U/L    AST 16 15 - 37 U/L    Globulin 2.4 g/dL   Magnesium   Result Value Ref Range    Magnesium 2.40 1.80 - 2.40 mg/dL   Protime-INR   Result Value Ref Range    Protime 15.6 (H) 10.0 - 13.2 sec    INR 1.34 (H) 0.86 - 1.14   POC ACT-Low Range   Result Value Ref Range    POC ACT  Not Establised sec   POC ACT-Low Range   Result Value Ref Range    POC ACT  Not Establised sec   POC ACT-Low Range   Result Value Ref Range    POC ACT  Not Establised sec   POC ACT-Low Range   Result Value Ref Range    POC ACT LR >400 Not Establised sec   EKG 12 Lead   Result Value Ref Range    Ventricular Rate 87 BPM    Atrial Rate 87 BPM    P-R Interval 228 ms    QRS Duration 90 ms    Q-T Interval 406 ms    QTc Calculation (Bazett) 488 ms    P Axis 72 degrees    R Axis 11 degrees    T Axis 50 degrees    Diagnosis       Atrial flutterConfirmed by Alice Pinto ([id number]) on 10/2/2020 6:05:32 PM   TYPE AND SCREEN   Result Value Ref Range    ABO/Rh A POS     Antibody Screen NEG          Impression (Medical Decision Making):       1. Lumbar stenosis with neurogenic claudication    2. Lumbar pain    3. Spondylolisthesis of lumbar region    4. Degenerative disc disease, lumbar        Plan (Medical Decision Making):    I discussed the diagnosis and the treatment options with April Villa today. In Summary:  The various treatment options were outlined and discussed with April Villa including:  Conservative care options: physical therapy, ice, medications, bracing, and activity modification. The indications for therapeutic injections. The indications for additional imaging/laboratory studies. The indications for (possible future) interventions.      After considering the various options discussed, April Villa elected to pursue a course of treatment that includes the followin. Medications: I will add a Zanaflex 4 mg po qhs to the current regimen. Counseled on risks, benefits and alternatives and recommended not to take the medicine and drive or operate heavy machinery. 2. PT:  Encouraged to continue with Home exercise program.    3. Further studies: MRI lumbar spine to evaluate for lumbar stenosis given xray findings      4. Interventional:  After further imaging is obtained, interventional options will be reviewed and recommended. 5. Healthy Lifestyle Measures:  Patient education material reviewing the following was distributed to Tiffanie Junior  Anatomic drawings  Healthy lifestyle education  Osteoporosis prevention,   Back and neck pain educational information   Advanced imaging preparedness    Posture education   Proper lifting and carrying techniques,   Weight management  Quitting smoking and   Minor ways to treat back pain  For further information regarding the spine conditions and to review interventional treatments the patient was directed to [employer].    6.  Follow up:  1-2 weeks    Tiffanie Junior was instructed to call the office if her symptoms worsen or if new symptoms appear prior to the next scheduled visit. She is specifically instructed to contact the office between now & her scheduled appointment if she has concerns related to her condition or if she needs assistance in scheduling the above tests. She is welcome to call for an appointment sooner if she has any additional concerns or questions. Vinay Mathis. Shirley Butt MD, JAKOB, [facility]  Board Certified in [address] [place] [address]  Certified and Fellowship Trained in [facility]     This dictation was performed with a verbal recognition program [facility]) and it was checked for errors. It is possible that there are still dictated errors within this office note.  If so, please bring any errors to my attention for an addendum. All efforts were made to ensure that this office note is accurate.

## 2020-11-06 NOTE — PROGRESS NOTES
Behavioral Health Consultation  Clari Manning, Ph.D.  Clinical Psychologist  2020  8:32 AM      Time spent with Patient: 30 minutes  This is patient's first [facility] appointment. Reason for Consult:    Chief Complaint   Patient presents with    Stress       Pt provided informed consent for the behavioral health program. Discussed with patient model of service to include the limits of confidentiality (i.e. abuse reporting, suicide intervention, etc.) and short-term intervention focused approach. Pt indicated understanding. Feedback given to PCP. S:  Pt seen per PCP re: depression. Patient reported depressive symptoms, including depressed mood, deactivation, anhedonia, insomnia (poor maintenance; takes a nap around 4pm for about 1 hr), fatigue \"I'm exhausted,\" and poor concentration/focus (only in last 6 mos). Noted her sxs of grief worsen considerably if she is alone. Wants to lose weight, stated she is eating primarily Shakley shakes and salads. In 2x/wk PT and starting to exercise 30 mins most days. Mother  May 13, 2020. \"I'm in totally untrodden territory. \" Formerly relied on her heavily for advice, but did report they had a strained relationship prior to the last 5 yrs. Reported she has mom's belongings all over her house. Since mom's death, has gotten closer to brothers, kelli her younger brother. Lives w her  and her cat, who she finds very soothing. No children. Last worked PT in [employer] , retired d/t pain; was on disability since 2013. Has an education degree, but \"I don't have the energy or the strength to do that anymore. \" When asked about what she enjoys, started \"I take care of my . \" When asked if this is enjoyable, stated \"not really. \"     Pt is Judaism and very active in her Zoroastrian prior to [place], but this has drastically decreased. Major goal is to be able to get off duloxetine. This note will not be viewable in [employer] for the following reason(s). Patient request to not have note available in MyChart.       O:  MSE:    Appearance    alert, cooperative  Impulsive behavior No  Speech    spontaneous, normal rate, normal volume and well articulated  Mood    Depressed  Affect    depressed affect  Thought Content    intact and cognitive distortions  Thought Process    linear, goal directed and coherent  Associations    logical connections  Insight    Fair  Judgment    Intact  Orientation    oriented to person, place, time, and general circumstances  Memory    recent and remote memory intact  Attention/Concentration    impaired  Morbid ideation No  Suicide Assessment    no suicidal ideation    History:    Social History:   Social History     Socioeconomic History    Marital status:      Spouse name: Not on file    Number of children: Not on file    Years of education: Not on file    Highest education level: Not on file   Occupational History    Not on file   Social Needs    Financial resource strain: Not hard at all   [employer] insecurity     Worry: Never true     Inability: Never true   [employer] needs     Medical: No     Non-medical: No   Tobacco Use    Smoking status: Never Smoker    Smokeless tobacco: Never Used   Substance and Sexual Activity    Alcohol use: Yes     Comment: 2 drinks per month    Drug use: No    Sexual activity: Not on file   Lifestyle    Physical activity     Days per week: Not on file     Minutes per session: Not on file    Stress: Not on file   Relationships    Social connections     Talks on phone: Not on file     Gets together: Not on file     Attends Episcopal service: Not on file     Active member of club or organization: Not on file     Attends meetings of clubs or organizations: Not on file     Relationship status: Not on file    Intimate partner violence     Fear of current or ex partner: Not on file     Emotionally abused: Not on file     Physically abused: Not on file     Forced sexual activity: Not on file Other Topics Concern    Not on file   Social History Narrative    Not on file     TOBACCO:   reports that she has never smoked. She has never used smokeless tobacco.  ETOH:   reports current alcohol use. Diagnosis:  Adjustment disorder with depressed mood    Plan:  Pt interventions:  Established rapport, Conducted functional assessment, [place]-setting to identify pt's primary goals for [facility] visit / overall health and Supportive techniques    Documentation was done using voice recognition dragon software. Every effort was made to ensure accuracy; however, inadvertent, unintentional computerized transcription errors may be present.

## 2020-11-10 ENCOUNTER — HOSPITAL ENCOUNTER (OUTPATIENT)
Dept: PHYSICAL THERAPY | Age: 60
Setting detail: THERAPIES SERIES
Discharge: HOME | End: 2020-11-10
Payer: MEDICARE

## 2020-11-10 PROCEDURE — 97110 THERAPEUTIC EXERCISES: CPT

## 2020-11-10 PROCEDURE — 97140 MANUAL THERAPY 1/> REGIONS: CPT

## 2020-11-10 NOTE — FLOWSHEET NOTE
[address] [facility]  Phone: [phone]   Fax: [phone]   Physical Therapy Re-Certification Plan of Care        Recommendation:    [x]Continue PT 2x / wk for 4 weeks. []Hold PT, pending MD visit    Physical Therapy Daily Treatment Note  Date:  11/10/2020    Patient Name:  Enzo Rosa    :  1960  MRN: [id number]  Medical/Treatment Diagnosis Information:  · Diagnosis: S/P Right Total shoulder sx  · Treatment Diagnosis: Right shoulder pain , decrease AROM, and strength  Insurance/Certification information:  PT Insurance Information: [facility]  Physician Information:  Referring Practitioner: Yariel Saul MD  Plan of care signed (Y/N): []  Yes [x]  No     Date of Patient follow up with Physician: MARIELA     Progress Report: []  Yes  [x]  No     Date Range for reporting period:  Beginnin20  Ending:10/22/20    Progress report due (10 Rx/or 30 days whichever is less): visit #91     Recertification due (POC duration/ or 90 days whichever is less):     Visit # Insurance Allowable Auth required? Date Range   +  3/8 Mn []  Yes  [x]  No Na     Latex Allergy:  [x]NO      []YES  Preferred Language for Healthcare:   [x]English       []other:    Functional Scale:   Quick Dash: raw score= 22; dysfunction =25%      Date assessed:  Quick Dash: raw score = 31; dysfunction = 56%  20    Pain level:  0/10     SUBJECTIVE:  Week 12 : 11/10: Patient reports that she was cleaning a closet on  and says now her right shoulder blade is in discomfort.        OBJECTIVE:            PROM AROM     L R L R   Cervical Flexion  [facility]   Cervical Extension  [facility][note 3] WFL   Cervical Rotation [facility] WFL WFL   C[facility] WF   Shoulder Flexion    130   117 vs 150   Shoulder Abduction    85   74 vs 130   Shoulder External Rotation  47         Strength (0-5) Left Right    Shoulder Shrug (C4) 4+ +4   Shoulder Flex 5 3+   Shoulder Abd (C5) 5 3+   Shoulder ER 5 3+   Shoulder IR 5 3+   Biceps (C6) 5     Triceps (C7) 5     Lats       Middle Trap       Rhomboids       Lower Trap        Pronation  3+ 4   Supination  3+ 4   Wrist Flex (C7) 3+ 4   Wrist Ext (C6) 3+ 4   Wrist Radial Deviation 3+ 4   Wrist Ulnar Deviation 3+ 4             10/8: Lightheaded    9/14: moderate antalgic gait on R foot       RESTRICTIONS/PRECAUTIONS:  Following Total Shoulder protocol ( Junior's) Avoiding IR     Exercises/Interventions:     Therapeutic Exercises (15978) Resistance / level Sets/sec Reps Notes   Pulleys to 90 deg flexion/scap 2  min      Table top : Flexion/abd , circles        Scapular retraction   10x    Mid rows  High rows  Bicep curls-    Tricep extension w/    Shoulder Add  Diagonal down D2  Single Arm Row  Blue /FM #25      4#    [place] #4    #5   2  2  2    2  2    2 10x  10x  10x    10x  10x  10x  10x     Added 9/14   Wand exercises;  t  Supine Flexion/abd  to 120 deg  ER @ 45 deg  Chest press   Supine:  Flexion / abd      5  15             Added 10/8   IR Strap   5/10  Added 10/28   Door way stretch   3/15   Added 11/10     Ball Rolls ( Red swiss ball) Flexion        Added 10/6   Shoulder AROM: using foam roll   Flexion/ abd   Shelf reaching (2nd shelf)    #3 10x    10x Added 10/13   Push Plus    10xAdded    (Seated) scaption   Added 10/6 emphasis to reduce upper trap compensation    UBE  2/2 fwd/bwd      Therapeutic Activities (94312)       Assessment                                    Neuromuscular Re-ed (78993)                                                 Manual Intervention (97248)       PROM all planes, G2 mobs infer/pos,  , STM upper trap, Right scap G2 mobs  12 min                                            Pt. Education:  -patient educated on diagnosis, prognosis and expectations for rehab  -all patient questions were answered    Home Exercise Program:   Access Code: [email or web address]   URL: [email or web address]   Date: 09/08/2020 Prepared by: Jordi Mcmullen     Exercises   Seated Shoulder Flexion Slide at Table Top with Forearm in Neutral - 10 reps - 3 sets - 3 hold - 1x daily - 7x weekly         Therapeutic Exercise and NMR EXR  [] (20537) Provided verbal/tactile cueing for activities related to strengthening, flexibility, endurance, ROM for improvements in  [] LE / Lumbar: LE, proximal hip, and core control with self care, mobility, lifting, ambulation. [] UE / Cervical: cervical, postural, scapular, scapulothoracic and UE control with self care, reaching, carrying, lifting, house/yardwork, driving, computer work.  [] (43712) Provided verbal/tactile cueing for activities related to improving balance, coordination, kinesthetic sense, posture, motor skill, proprioception to assist with   [] LE / lumbar: LE, proximal hip, and core control in self care, mobility, lifting, ambulation and eccentric single leg control. [] UE / cervical: cervical, scapular, scapulothoracic and UE control with self care, reaching, carrying, lifting, house/yardwork, driving, computer work.   [] (79888) Therapist is in constant attendance of 2 or more patients providing skilled therapy interventions, but not providing any significant amount of measurable one-on-one time to either patient, for improvements in  [] LE / lumbar: LE, proximal hip, and core control in self care, mobility, lifting, ambulation and eccentric single leg control. [] UE / cervical: cervical, scapular, scapulothoracic and UE control with self care, reaching, carrying, lifting, house/yardwork, driving, computer work.      NMR and Therapeutic Activities:    [] (84770 or 16067) Provided verbal/tactile cueing for activities related to improving balance, coordination, kinesthetic sense, posture, motor skill, proprioception and motor activation to allow for proper function of   [] LE: / Lumbar core, proximal hip and LE with self care and ADLs  [] UE / Cervical: cervical, postural, scapular, extensibility and allowing for proper ROM for normal function with   [] LE / lumbar: self care, mobility, lifting and ambulation. [] UE / Cervical: self care, reaching, carrying, lifting, house/yardwork, driving, computer work. Modalities:  [] (76511) Vasopneumatic compression: Utilized vasopneumatic compression to decrease edema / swelling for the purpose of improving mobility and quad tone / recruitment which will allow for increased overall function including but not limited to self-care, transfers, ambulation, and ascending / descending stairs. Modalities: 9/8, 9/10, 9/15, 9/23, 10/6, 10/8, 10/13, 10/15, 10/20, 10/22, 10/26, 10/28, 11/4 CP to Right shoulder supine with bolster underneath BLEs    Charges:  Timed Code Treatment Minutes: 44   Total Treatment Minutes: 44     [] EVAL - LOW (52296)   [] EVAL - MOD (26217)  [] EVAL - HIGH (86107)  [] RE-EVAL (86513)  [x] [place](70962) x  2     [] Ionto  [] NMR (31553) x       [] Vaso  [x] Manual (39735) x 1     [] Ultrasound  [] TA x         [] Mech Traction (42580)  [] Aquatic Therapy x     [] ES (un) (70765):   [] Home Management Training x  [] ES(attended) (19564)   [] Dry Needling 1-2 muscles (05255):  [] Dry Needling 3+ muscles (451892)  [] Group:      [] Other:     GOALS:  Patient stated goal: To get back to swimming   []? Progressing: []? Met: []? Not Met: []? Adjusted     Therapist goals for Patient:   Short Term Goals: To be achieved in: 2 weeks  1. Independent in HEP and progression per patient tolerance, in order to prevent re-injury. []? Progressing: []? Met: []? Not Met: []? Adjusted  2. Patient will have a decrease in pain to facilitate improvement in movement, function, and ADLs as indicated by Functional Deficits. []? Progressing: []? Met: []? Not Met: []? Adjusted     Long Term Goals: To be achieved in: 6 weeks/ DC   1. Disability index score of 30% or less for the  Quick DASH to assist with reaching prior level of function.    []?

## 2020-11-11 ENCOUNTER — OFFICE VISIT (OUTPATIENT)
Dept: INTERNAL MEDICINE CLINIC | Age: 60
End: 2020-11-11
Payer: MEDICARE

## 2020-11-11 VITALS
TEMPERATURE: 95.3 F | WEIGHT: 270 LBS | HEIGHT: 63 IN | DIASTOLIC BLOOD PRESSURE: 84 MMHG | HEART RATE: 76 BPM | BODY MASS INDEX: 47.84 KG/M2 | SYSTOLIC BLOOD PRESSURE: 138 MMHG

## 2020-11-11 LAB — HBA1C MFR BLD: 5.6 %

## 2020-11-11 PROCEDURE — 99215 OFFICE O/P EST HI 40 MIN: CPT | Performed by: NURSE PRACTITIONER

## 2020-11-11 PROCEDURE — G8427 DOCREV CUR MEDS BY ELIG CLIN: HCPCS | Performed by: NURSE PRACTITIONER

## 2020-11-11 PROCEDURE — G8484 FLU IMMUNIZE NO ADMIN: HCPCS | Performed by: NURSE PRACTITIONER

## 2020-11-11 PROCEDURE — 83036 HEMOGLOBIN GLYCOSYLATED A1C: CPT | Performed by: NURSE PRACTITIONER

## 2020-11-11 PROCEDURE — G8417 CALC BMI ABV UP PARAM F/U: HCPCS | Performed by: NURSE PRACTITIONER

## 2020-11-11 PROCEDURE — 1036F TOBACCO NON-USER: CPT | Performed by: NURSE PRACTITIONER

## 2020-11-11 PROCEDURE — 3017F COLORECTAL CA SCREEN DOC REV: CPT | Performed by: NURSE PRACTITIONER

## 2020-11-11 RX ORDER — CETIRIZINE HYDROCHLORIDE 10 MG/1
10 TABLET ORAL DAILY
Qty: 90 TABLET | Refills: 1 | Status: SHIPPED | OUTPATIENT
Start: 2020-11-11 | End: 2021-04-06 | Stop reason: SDUPTHER

## 2020-11-11 RX ORDER — DULOXETIN HYDROCHLORIDE 60 MG/1
60 CAPSULE, DELAYED RELEASE ORAL DAILY
Qty: 90 CAPSULE | Refills: 1 | Status: SHIPPED | OUTPATIENT
Start: 2020-11-11 | End: 2021-04-06 | Stop reason: SDUPTHER

## 2020-11-11 RX ORDER — LEVOTHYROXINE SODIUM 112 UG/1
112 TABLET ORAL DAILY
Qty: 90 TABLET | Refills: 1 | Status: SHIPPED | OUTPATIENT
Start: 2020-11-11 | End: 2021-04-06 | Stop reason: SDUPTHER

## 2020-11-11 RX ORDER — MONTELUKAST SODIUM 10 MG/1
10 TABLET ORAL NIGHTLY
Qty: 90 TABLET | Refills: 1 | Status: SHIPPED | OUTPATIENT
Start: 2020-11-11 | End: 2021-05-05 | Stop reason: SDUPTHER

## 2020-11-11 RX ORDER — MONTELUKAST SODIUM 4 MG/1
1 TABLET, CHEWABLE ORAL 2 TIMES DAILY
Qty: 180 TABLET | Refills: 1 | Status: SHIPPED | OUTPATIENT
Start: 2020-11-11 | End: 2021-04-06 | Stop reason: SDUPTHER

## 2020-11-11 ASSESSMENT — ENCOUNTER SYMPTOMS
DIARRHEA: 0
TROUBLE SWALLOWING: 0
SINUS PAIN: 0
EYE ITCHING: 1
COUGH: 1
VOMITING: 0
SHORTNESS OF BREATH: 0
NAUSEA: 0
RHINORRHEA: 1
CONSTIPATION: 0
EYE PAIN: 0
ABDOMINAL PAIN: 0
WHEEZING: 0
SINUS PRESSURE: 1
SORE THROAT: 0
BLOOD IN STOOL: 0

## 2020-11-11 ASSESSMENT — PATIENT HEALTH QUESTIONNAIRE - PHQ9
SUM OF ALL RESPONSES TO PHQ QUESTIONS 1-9: 0
1. LITTLE INTEREST OR PLEASURE IN DOING THINGS: 0
SUM OF ALL RESPONSES TO PHQ9 QUESTIONS 1 & 2: 0
SUM OF ALL RESPONSES TO PHQ QUESTIONS 1-9: 0
2. FEELING DOWN, DEPRESSED OR HOPELESS: 0
SUM OF ALL RESPONSES TO PHQ QUESTIONS 1-9: 0

## 2020-11-11 NOTE — PROGRESS NOTES
Systems   Constitutional: Negative for appetite change, chills, diaphoresis, fatigue, fever and unexpected weight change. HENT: Positive for ear pain, rhinorrhea and sinus pressure. Negative for congestion, drooling, ear discharge, hearing loss, postnasal drip, sinus pain, sneezing, sore throat and trouble swallowing. Eyes: Positive for itching (watery). Negative for pain and visual disturbance. Respiratory: Positive for cough. Negative for shortness of breath and wheezing. Cardiovascular: Negative for chest pain, palpitations and leg swelling. Gastrointestinal: Negative for abdominal pain, blood in stool, constipation, diarrhea, nausea and vomiting. Skin: Negative for pallor and rash. Neurological: Negative for dizziness, weakness, light-headedness, numbness and headaches. Psychiatric/Behavioral: Negative for dysphoric mood, self-injury, sleep disturbance and suicidal ideas. The patient is not nervous/anxious.       Allergies   Allergen Reactions    Latex Rash    Statins Nausea Only     Current Outpatient Rx   Medication Sig Dispense Refill    montelukast (SINGULAIR) 10 MG tablet Take 1 tablet by mouth nightly 90 tablet 1    DULoxetine (CYMBALTA) 60 MG extended release capsule Take 1 capsule by mouth daily 90 capsule 1    levothyroxine (SYNTHROID) 112 MCG tablet Take 1 tablet by mouth daily 90 tablet 1    cetirizine (ZYRTEC) 10 MG tablet Take 1 tablet by mouth daily 90 tablet 1    colestipol (COLESTID) 1 g tablet Take 1 tablet by mouth 2 times daily 180 tablet 1    rivaroxaban (XARELTO) 20 MG TABS tablet Take 1 tablet by mouth Daily with supper 90 tablet 1    metoprolol succinate (TOPROL XL) 25 MG extended release tablet Take 0.5 tablets by mouth daily 180 tablet 0    vitamin D (ERGOCALCIFEROL) 1.25 MG (97416 UT) CAPS capsule Take 1 capsule by mouth every 14 days 6 capsule 1    acetaminophen (TYLENOL 8 HOUR) 650 MG extended release tablet Take 650 mg by mouth every 8 hours as needed for Pain      gabapentin (NEURONTIN) 300 MG capsule Take 2 capsules by mouth 3 times daily for 90 days. (Patient taking differently: Take 600 mg by mouth 2 times daily. Take 1 tablet BID) 540 capsule 0       Patient Active Problem List   Diagnosis    Hypertension    Hyperlipidemia    Back pain    Sleep apnea    Urinary incontinence    Class 3 severe obesity due to excess calories without serious comorbidity with body mass index (BMI) of 45.0 to 49.9 in adult [facility])    Peripheral polyneuropathy    Neurogenic claudication due to lumbar spinal stenosis    Osteoarthritis of spine with radiculopathy, lumbar region    Balance problem    Skin lesion of left leg    Intractable chronic migraine without aura and with status migrainosus    Prediabetes    Depression    Asthma    Fibromyalgia    PAF (paroxysmal atrial fibrillation) ([facility] [address]        Wt Readings from Last 3 Encounters:   11/11/20 270 lb (122.5 kg)   11/06/20 266 lb (120.7 kg)   11/05/20 271 lb 8 oz (123.2 kg)     BP Readings from Last 3 Encounters:   11/11/20 138/84   11/05/20 134/88   10/08/20 126/72     The 10-year ASCVD risk score (Jessica Moura, et al., 2013) is: 3.6%    Values used to calculate the score:      Age: 61 years      Sex: Female      Is Non- : No      Diabetic: No      Tobacco smoker: No      Systolic Blood Pressure: 452 mmHg      Is BP treated: No      HDL Cholesterol: 65 mg/dL      Total Cholesterol: 214 mg/dL    PHQ-9 Total Score: 0 (11/11/2020  2:38 PM)    Objective/Physical Exam:  /84   Pulse 76   Temp 95.3 °F (35.2 °C) (Temporal)   Ht 5' 3\" (1.6 m)   Wt 270 lb (122.5 kg)   BMI 47.83 kg/m²   Body mass index is 47.83 kg/m². Physical Exam  Vitals signs reviewed. Constitutional:       General: She is not in acute distress. Appearance: She is well-developed. She is not diaphoretic. HENT:      Head: Normocephalic and atraumatic.       Right Ear: Tympanic membrane and external ear normal.      Left Ear: There is impacted cerumen. Ears:      Comments: Curette was utilized to remove wax from left ear. All wax removed. TM appears normal bilaterally  Eyes:      Conjunctiva/sclera: Conjunctivae normal.      Pupils: Pupils are equal, round, and reactive to light. Cardiovascular:      Rate and Rhythm: Normal rate and regular rhythm. Pulmonary:      Effort: Pulmonary effort is normal. No respiratory distress. Breath sounds: Normal breath sounds. No wheezing or rales. Chest:      Chest wall: No tenderness. Abdominal:      General: Bowel sounds are normal. There is no distension. Palpations: Abdomen is soft. Tenderness: There is no abdominal tenderness. There is no guarding. Musculoskeletal: Normal range of motion. General: No tenderness or deformity. Skin:     General: Skin is warm and dry. Coloration: Skin is not pale. Findings: No erythema or rash. Neurological:      Mental Status: She is alert and oriented to person, place, and time. Coordination: Coordination normal.   Psychiatric:         Mood and Affect: Mood normal.       Assessment and Plan:  Roxana Mark was seen today for 3 month follow-up. Diagnoses and all orders for this visit:    Essential hypertension/PAF (paroxysmal atrial fibrillation) (HCC)/Other hyperlipidemia   - Continue with cardiology. - BP running to goal at home. - Cholesterol medication refilled today    Stressful life event affecting family   - Well controlled per pt. - Continue current regimen. - Cymbalta refilled. Other sleep apnea   - Continue CPAP use. Seasonal allergic rhinitis due to other allergic trigger/Sensation of plugged ear on left side/Itchy, watery, and red eye/cerumen impaction  -    Pt stopped zyrtec. Since, allergy symptoms worsened. - Recommended restarting zyrtec and adding flonase. Pt agreeable. Continue singulair.   - montelukast (SINGULAIR) 10 MG tablet;  Take 1 tablet by mouth nightly  - cetirizine (ZYRTEC) 10 MG tablet; Take 1 tablet by mouth daily  - Curette was utilized to remove wax from left ear. All wax removed. TM appears normal bilaterally. Lifestyle modifications to prevent cerumen impaction reviewed. Uncomplicated asthma, unspecified asthma severity, unspecified whether persistent   - Not using inhalers. Asymptomatic. Not interested in inhalers. Denies trouble breathing/SOB/wheezing. Fibromyalgia  -     Continue with rheumatology. Other specified hypothyroidism  -    Last TSH stable. Asymptomatic. Denies side effects.   - levothyroxine (SYNTHROID) 112 MCG tablet; Take 1 tablet by mouth daily- refilled today    Prediabetes  -     POCT glycosylated hemoglobin (Hb A1C)- 5.6%  - Lifestyle modifications such as exercise, weight loss and healthy diet encouraged and reviewed with the pt. Over 60 minutes were spent coordinating care of the patient; over 50 minutes of which were spent face to face reviewing health education, health maintenance, and coordination of care. Has colonoscopy in 2 weeks. Return in about 1 day (around 11/12/2020) for AWV telephone visit and 6 month annual exam 40 min HTN/mood/asthma/TSH f/u with labs or sooner PRN. Pt will call if symptoms worsen or fail to improve. All questions answered. Pt states no further questions or concerns at this time.    Electronically signed by: Subhash Rosa 11/11/20

## 2020-11-12 ENCOUNTER — HOSPITAL ENCOUNTER (OUTPATIENT)
Dept: PHYSICAL THERAPY | Age: 60
Setting detail: THERAPIES SERIES
Discharge: HOME | End: 2020-11-12
Payer: MEDICARE

## 2020-11-12 ENCOUNTER — TELEPHONE (OUTPATIENT)
Dept: ORTHOPEDIC SURGERY | Age: 60
End: 2020-11-12

## 2020-11-12 ENCOUNTER — TELEMEDICINE (OUTPATIENT)
Dept: INTERNAL MEDICINE CLINIC | Age: 60
End: 2020-11-12
Payer: MEDICARE

## 2020-11-12 PROCEDURE — 97110 THERAPEUTIC EXERCISES: CPT

## 2020-11-12 PROCEDURE — G8484 FLU IMMUNIZE NO ADMIN: HCPCS | Performed by: NURSE PRACTITIONER

## 2020-11-12 PROCEDURE — G0439 PPPS, SUBSEQ VISIT: HCPCS | Performed by: NURSE PRACTITIONER

## 2020-11-12 PROCEDURE — 97140 MANUAL THERAPY 1/> REGIONS: CPT

## 2020-11-12 PROCEDURE — 3017F COLORECTAL CA SCREEN DOC REV: CPT | Performed by: NURSE PRACTITIONER

## 2020-11-12 ASSESSMENT — PATIENT HEALTH QUESTIONNAIRE - PHQ9
SUM OF ALL RESPONSES TO PHQ QUESTIONS 1-9: 0
1. LITTLE INTEREST OR PLEASURE IN DOING THINGS: 0
SUM OF ALL RESPONSES TO PHQ QUESTIONS 1-9: 0
2. FEELING DOWN, DEPRESSED OR HOPELESS: 0
SUM OF ALL RESPONSES TO PHQ9 QUESTIONS 1 & 2: 0
SUM OF ALL RESPONSES TO PHQ QUESTIONS 1-9: 0
SUM OF ALL RESPONSES TO PHQ QUESTIONS 1-9: 0
1. LITTLE INTEREST OR PLEASURE IN DOING THINGS: 0

## 2020-11-12 ASSESSMENT — LIFESTYLE VARIABLES: HOW OFTEN DO YOU HAVE A DRINK CONTAINING ALCOHOL: 0

## 2020-11-12 NOTE — PATIENT INSTRUCTIONS
Personalized Preventive Plan for Brandon Melgoza - 11/12/2020  Medicare offers a range of preventive health benefits. Some of the tests and screenings are paid in full while other may be subject to a deductible, co-insurance, and/or copay. Some of these benefits include a comprehensive review of your medical history including lifestyle, illnesses that may run in your family, and various assessments and screenings as appropriate. After reviewing your medical record and screening and assessments performed today your provider may have ordered immunizations, labs, imaging, and/or referrals for you. A list of these orders (if applicable) as well as your Preventive Care list are included within your After Visit Summary for your review. Other Preventive Recommendations:    · A preventive eye exam performed by an eye specialist is recommended every 1-2 years to screen for glaucoma; cataracts, macular degeneration, and other eye disorders. · A preventive dental visit is recommended every 6 months. · Try to get at least 150 minutes of exercise per week or 10,000 steps per day on a pedometer . · Order or download the FREE \"Exercise & Physical Activity: Your Everyday Guide\" from [employer]. Call 0-639.833.6275 or search [employer] online. · You need 9548-7446 mg of calcium and 7913-7916 IU of vitamin D per day. It is possible to meet your calcium requirement with diet alone, but a vitamin D supplement is usually necessary to meet this goal.  · When exposed to the sun, use a sunscreen that protects against both UVA and UVB radiation with an SPF of 30 or greater. Reapply every 2 to 3 hours or after sweating, drying off with a towel, or swimming. · Always wear a seat belt when traveling in a car. Always wear a helmet when riding a bicycle or motorcycle.

## 2020-11-12 NOTE — PROGRESS NOTES
Medicare Annual Wellness Visit    Are Name: Sierra Laboy Date: 2020   MRN: [id number] Sex: Female   Age: 61 y.o. Ethnicity: Non-/Non    : 1960 Race: Jesse Hilliard is here for Medicare AWV    Screenings for behavioral, psychosocial and functional/safety risks, and cognitive dysfunction are all negative except as indicated below. These results, as well as other patient data from the [address] form, are documented in Flowsheets linked to this Encounter. Allergies   Allergen Reactions    Latex Rash    Statins Nausea Only         Prior to Visit Medications    Medication Sig Taking? Authorizing Provider   montelukast (SINGULAIR) 10 MG tablet Take 1 tablet by mouth nightly Yes JOSE Dos Santos CNP   DULoxetine (CYMBALTA) 60 MG extended release capsule Take 1 capsule by mouth daily Yes JOSE Dos Santos CNP   levothyroxine (SYNTHROID) 112 MCG tablet Take 1 tablet by mouth daily Yes JOSE Dos Santos CNP   cetirizine (ZYRTEC) 10 MG tablet Take 1 tablet by mouth daily Yes JOSE Dos Santos CNP   colestipol (COLESTID) 1 g tablet Take 1 tablet by mouth 2 times daily Yes JOSE Dos Santos CNP   rivaroxaban (XARELTO) 20 MG TABS tablet Take 1 tablet by mouth Daily with supper Yes JOSE Stovall CNP   metoprolol succinate (TOPROL XL) 25 MG extended release tablet Take 0.5 tablets by mouth daily Yes JOSE Stovall CNP   vitamin D (ERGOCALCIFEROL) 1.25 MG (38692 UT) CAPS capsule Take 1 capsule by mouth every 14 days Yes Nancy Bruce MD   acetaminophen (TYLENOL 8 HOUR) 650 MG extended release tablet Take 650 mg by mouth every 8 hours as needed for Pain Yes Historical Provider, MD   gabapentin (NEURONTIN) 300 MG capsule Take 2 capsules by mouth 3 times daily for 90 days. Patient taking differently: Take 600 mg by mouth 2 times daily.  Take 1 tablet BID  Nancy Bruce MD Past Medical History:   Diagnosis Date    A-fib ([address]     Anxiety     Asthma     Back pain     Depression     Fibromyalgia     Hyperlipidemia     Hypertension     Migraines     Prediabetes     Unspecified sleep apnea     Urinary incontinence        Past Surgical History:   Procedure Laterality Date    BREAST ENHANCEMENT SURGERY      COLONOSCOPY N/A 11/27/2019    COLONOSCOPY POLYPECTOMY SNARE/COLD BIOPSY performed by Adelia Cintron MD at [address] 2017    TONSILLECTOMY  1968    TUBAL LIGATION           Family History   Problem Relation Age of Onset    Cancer Other     Cancer Other     Cancer Maternal Uncle         melanoma    Arthritis Mother     Diabetes Mother     Heart Disease Mother         a-fib    COPD Mother     Cancer Mother         skin    Ovarian Cancer Mother     Stroke Mother     COPD Father     Arthritis Father     Cancer Father         lung, prostate    COPD Brother     Stroke Brother     Glaucoma Maternal Grandmother     Breast Cancer Maternal Grandmother         breast    Heart Attack Neg Hx      CareTeam (Including outside providers/suppliers regularly involved in providing care):   Patient Care Team:  Garo Blunt [facility] as PCP - General (Nurse Practitioner)  JOSE Contreras CNP as PCP - [facility] Empaneled Provider    Wt Readings from Last 3 Encounters:   11/11/20 270 lb (122.5 kg)   11/06/20 266 lb (120.7 kg)   11/05/20 271 lb 8 oz (123.2 kg)      Patient-Reported Vitals 11/12/2020   Patient-Reported Weight 270lbs   Patient-Reported Systolic -   Patient-Reported Diastolic -   Patient-Reported Temperature -     Based upon direct observation of the patient, evaluation of cognition reveals recent and remote memory intact. Patient's complete Health Risk Assessment and screening values have been reviewed and are found in Flowsheets.  The following problems were reviewed today and where indicated follow up appointments were made and/or referrals ordered. Positive Risk Factor Screenings with Interventions:       General Health and ACP:  General  In general, how would you say your health is?: Very Good  In the past 7 days, have you experienced any of the following? New or Increased Pain, New or Increased Fatigue, Loneliness, Social Isolation, Stress or Anger?: None of These  Do you get the social and emotional support that you need?: Yes  Do you have a Living Will?: Yes  Advance Directives     Power of [address] Will ACP-Advance Directive ACP-Power of     Not on File Not on File Not on File Not on File      General Health Risk Interventions:  · recommended pt bring living will to the office. Health Habits/Nutrition:  Health Habits/Nutrition  Do you exercise for at least 20 minutes 2-3 times per week?: Yes  Have you lost any weight without trying in the past 3 months?: No  Do you eat fewer than 2 meals per day?: No  Have you seen a dentist within the past year?: Yes     Health Habits/Nutrition Interventions:  · Lifestyle modifications such as exercise, weight loss and healthy diet encouraged and reviewed with the pt.     Personalized Preventive Plan   Current Health Maintenance Status  Immunization History   Administered Date(s) Administered    Influenza, MDCK Quadv, IM, PF (Flucelvax 4 yrs and older) 11/07/2019      Health Maintenance   Topic Date Due    Annual Wellness Visit (AWV)  06/23/2019    Colon cancer screen colonoscopy  11/27/2020    Shingles Vaccine (2 of 2) 12/06/2020    A1C test (Diabetic or Prediabetic)  11/11/2021    Cervical cancer screen  11/15/2021    Breast cancer screen  12/09/2021    DTaP/Tdap/Td vaccine (2 - Td) 10/01/2023    Lipid screen  10/16/2024    Flu vaccine  Completed    Pneumococcal 0-64 years Vaccine  Completed    Hepatitis C screen  Completed    HIV screen  Completed    Hepatitis A vaccine  Aged C/ [address] 19 Hepatitis B vaccine  Aged Out    Hib vaccine  Aged Out    Meningococcal (ACWY) vaccine  Aged Out     Recommendations for [employer] Due: see orders and patient instructions/AVS.    Recommended screening schedule for the next 5-10 years is provided to the patient in written form: see Patient Regina Valentin was seen today for medicare awv. Diagnoses and all orders for this visit:    Routine general medical examination at a health care facility   - Lifestyle modifications such as exercise, weight loss and healthy diet encouraged and reviewed with the pt.   - Has colonoscopy scheduled in 2 weeks. Maxine Flores is a 61 y.o. female being evaluated by a Virtual Visit (phone) encounter to address concerns as mentioned above. A caregiver was present when appropriate. Due to this being a TeleHealth encounter (During [place] [address] public health emergency), evaluation of the following organ systems was limited: Vitals/Constitutional/EENT/Resp/CV/GI//MS/Neuro/Skin/Heme-Lymph-Imm. Pursuant to the emergency declaration under the [address] [place] [address], [address] [place] [address] authority and the [employer] Act, this Virtual Visit was conducted with patient's (and/or legal guardian's) consent, to reduce the patient's risk of exposure to COVID-19 and provide necessary medical care. The patient (and/or legal guardian) has also been advised to contact this office for worsening conditions or problems, and seek emergency medical treatment and/or call 911 if deemed necessary. Patient identification was verified at the start of the visit: Yes    Services were provided through phone to substitute for in-person clinic visit. Patient and provider were located at their individual homes. --JOSE Rosario CNP on 11/12/2020 at 3:48 PM    An electronic signature was used to authenticate this note.

## 2020-11-12 NOTE — FLOWSHEET NOTE
[address] [facility] Physical Therapy  Phone: [phone]   Fax: [phone]   Physical Therapy Re-Certification Plan of Care        Recommendation:    [x]Continue PT 2x / wk for 4 weeks. []Hold PT, pending MD visit    Physical Therapy Daily Treatment Note  Date:  2020    Patient Name:  Kuldip Hyde    :  1960  MRN: [id number]  Medical/Treatment Diagnosis Information:  · Diagnosis: S/P Right Total shoulder sx  · Treatment Diagnosis: Right shoulder pain , decrease AROM, and strength  Insurance/Certification information:  PT Insurance Information: [facility]  Physician Information:  Referring Practitioner: Lillian Monteiro MD  Plan of care signed (Y/N): []  Yes [x]  No     Date of Patient follow up with Physician: MARIELA     Progress Report: []  Yes  [x]  No     Date Range for reporting period:  Beginnin20  Ending:10/22/20    Progress report due (10 Rx/or 30 days whichever is less): visit #82     Recertification due (POC duration/ or 90 days whichever is less):     Visit # Insurance Allowable Auth required? Date Range   +   Mn []  Yes  [x]  No Na     Latex Allergy:  [x]NO      []YES  Preferred Language for Healthcare:   [x]English       []other:    Functional Scale:   Quick Dash: raw score= 22; dysfunction =25%      Date assessed:  Quick Dash: raw score = 31; dysfunction = 56%  20    Pain level:  4/10     SUBJECTIVE:  Week 12 : : Patient reports that she's been having right shoulder pain this morning.  States that she's achy over  OBJECTIVE:            PROM AROM     L R L R   Cervical Flexion  [facility] WFL   Cervical Extension  [facility] WFL   Cervical Rotation [facility] WFL WFL   [facility]   Shoulder Flexion    130   117 vs 150   Shoulder Abduction    85   74 vs 130   Shoulder External Rotation  47         Strength (0-5) Left Right    Shoulder Shrug (C4) 4+ +4   Shoulder Flex 5 3+   Shoulder Abd (C5) 5 3+ Shoulder ER 5 3+   Shoulder IR 5 3+   Biceps (C6) 5     Triceps (C7) 5     Lats       Middle Trap       Rhomboids       Lower Trap        Pronation  3+ 4   Supination  3+ 4   Wrist Flex (C7) 3+ 4   Wrist Ext (C6) 3+ 4   Wrist Radial Deviation 3+ 4   Wrist Ulnar Deviation 3+ 4             10/8: Lightheaded    9/14: moderate antalgic gait on R foot       RESTRICTIONS/PRECAUTIONS:  Following Total Shoulder protocol ( [employer]) Avoiding IR     Exercises/Interventions:     Therapeutic Exercises (77555) Resistance / level Sets/sec Reps Notes   Pulleys to 90 deg flexion/scap 2  min      Table top : Flexion/abd , circles        Scapular retraction   10x    Mid rows  High rows  Bicep curls-  LPD    Tricep extension w/    Shoulder Add  Diagonal down D2  Single Arm Row  Blue /FM #25      4#    [place]umbell #4    #5  #5  #15 2  2  2  2   2  2    2 10x  10x  10x  10x  10x  10x  10x  10x  15x  11/12 one set #30    Added 9/14   Wand exercises;  t  Supine Flexion/abd  to 120 deg  ER @ 45 deg  Chest press   Supine:  Flexion / abd      5  15             Added 10/8   IR Strap   5/10  Added 10/28   Door way stretch   3/15   Added 11/10     Ball Rolls ( Red swiss ball) Flexion        Added 10/6   Shoulder AROM: using foam roll   Flexion/ abd   Shelf reaching (2nd shelf)    #3 10x    10x Added 10/13   Push Plus    10xAdded    (Seated) scaption   Added 10/6 emphasis to reduce upper trap compensation    UBE  2/2 fwd/bwd      Therapeutic Activities (48712)       Assessment                                    Neuromuscular Re-ed (65475)                                                 Manual Intervention (88227)       PROM all planes, G2 mobs infer/pos,  , Right scap G2 mobs  13 min                                            Pt. Education:  -patient educated on diagnosis, prognosis and expectations for rehab  -all patient questions were answered    Home Exercise Program:   Access Code: [email or web address]   URL: [email or web address]   Date: 09/08/2020   Prepared by: Cristian Cardona     Exercises   Seated Shoulder Flexion Slide at Table Top with Forearm in Neutral - 10 reps - 3 sets - 3 hold - 1x daily - 7x weekly         Therapeutic Exercise and NMR EXR  [] (62790) Provided verbal/tactile cueing for activities related to strengthening, flexibility, endurance, ROM for improvements in  [] LE / Lumbar: LE, proximal hip, and core control with self care, mobility, lifting, ambulation. [] UE / Cervical: cervical, postural, scapular, scapulothoracic and UE control with self care, reaching, carrying, lifting, house/yardwork, driving, computer work.  [] (00254) Provided verbal/tactile cueing for activities related to improving balance, coordination, kinesthetic sense, posture, motor skill, proprioception to assist with   [] LE / lumbar: LE, proximal hip, and core control in self care, mobility, lifting, ambulation and eccentric single leg control. [] UE / cervical: cervical, scapular, scapulothoracic and UE control with self care, reaching, carrying, lifting, house/yardwork, driving, computer work.   [] (06635) Therapist is in constant attendance of 2 or more patients providing skilled therapy interventions, but not providing any significant amount of measurable one-on-one time to either patient, for improvements in  [] LE / lumbar: LE, proximal hip, and core control in self care, mobility, lifting, ambulation and eccentric single leg control. [] UE / cervical: cervical, scapular, scapulothoracic and UE control with self care, reaching, carrying, lifting, house/yardwork, driving, computer work.      NMR and Therapeutic Activities:    [] (67799 or 41200) Provided verbal/tactile cueing for activities related to improving balance, coordination, kinesthetic sense, posture, motor skill, proprioception and motor activation to allow for proper function of   [] LE: / Lumbar core, proximal hip and LE with self care and ADLs  [] UE / Cervical: cervical, postural, scapular, scapulothoracic and UE control with self care, carrying, lifting, driving, computer work.   [] (06580) Gait Re-education- Provided training and instruction to the patient for proper LE, core and proximal hip recruitment and positioning and eccentric body weight control with ambulation re-education including up and down stairs     Home Management Training / Self Care:  [] (92448) Provided self-care/home management training related to activities of daily living and compensatory training, and/or use of adaptive equipment for improvement with: ADLs and compensatory training, meal preparation, safety procedures and instruction in use of adaptive equipment, including bathing, grooming, dressing, personal hygiene, basic household cleaning and chores.      Home Exercise Program:    [x] (55452) Reviewed/Progressed HEP activities related to strengthening, flexibility, endurance, ROM of   [] LE / Lumbar: core, proximal hip and LE for functional self-care, mobility, lifting and ambulation/stair navigation   [] UE / Cervical: cervical, postural, scapular, scapulothoracic and UE control with self care, reaching, carrying, lifting, house/yardwork, driving, computer work  [] (58963)Reviewed/Progressed HEP activities related to improving balance, coordination, kinesthetic sense, posture, motor skill, proprioception of   [] LE: core, proximal hip and LE for self care, mobility, lifting, and ambulation/stair navigation    [] UE / Cervical: cervical, postural,  scapular, scapulothoracic and UE control with self care, reaching, carrying, lifting, house/yardwork, driving, computer work    Manual Treatments:  PROM / STM / Oscillations-Mobs:  G-I, II, III, IV (PA's, Inf., Post.)  [x] (22438) Provided manual therapy to mobilize LE, proximal hip and/or LS spine soft tissue/joints for the purpose of modulating pain, promoting relaxation,  increasing ROM, reducing/eliminating soft tissue swelling/inflammation/restriction, improving soft tissue extensibility and allowing for proper ROM for normal function with   [] LE / lumbar: self care, mobility, lifting and ambulation. [] UE / Cervical: self care, reaching, carrying, lifting, house/yardwork, driving, computer work. Modalities:  [] (77679) Vasopneumatic compression: Utilized vasopneumatic compression to decrease edema / swelling for the purpose of improving mobility and quad tone / recruitment which will allow for increased overall function including but not limited to self-care, transfers, ambulation, and ascending / descending stairs. Modalities: 9/8, 9/10, 9/15, 9/23, 10/6, 10/8, 10/13, 10/15, 10/20, 10/22, 10/26, 10/28, 11/4 CP to Right shoulder supine with bolster underneath BLEs    Charges:  Timed Code Treatment Minutes: 45   Total Treatment Minutes: 45     [] EVAL - LOW (72960)   [] EVAL - MOD (94185)  [] EVAL - HIGH (79911)  [] RE-EVAL (62640)  [x] ZH(32087) x  2     [] Ionto  [] NMR (70569) x       [] Vaso  [x] Manual (61553) x 1     [] Ultrasound  [] TA x         [] Mech Traction (91268)  [] Aquatic Therapy x     [] ES (un) (06887):   [] Home Management Training x  [] ES(attended) (22937)   [] Dry Needling 1-2 muscles (07775):  [] Dry Needling 3+ muscles (589400)  [] Group:      [] Other:     GOALS:  Patient stated goal: To get back to swimming   []? Progressing: []? Met: []? Not Met: []? Adjusted     Therapist goals for Patient:   Short Term Goals: To be achieved in: 2 weeks  1. Independent in HEP and progression per patient tolerance, in order to prevent re-injury. []? Progressing: []? Met: []? Not Met: []? Adjusted  2. Patient will have a decrease in pain to facilitate improvement in movement, function, and ADLs as indicated by Functional Deficits. []? Progressing: []? Met: []? Not Met: []? Adjusted     Long Term Goals: To be achieved in: 6 weeks/ DC   1. Disability index score of 30% or less for the  Quick DASH to assist with reaching prior level of function. []? Progressing: []? Met: []? Not Met: []? Adjusted  2. Patient will demonstrate increased AROM to right shoulder by 15 deg to allow for proper joint functioning as indicated by patients Functional Deficits. []? Progressing: []? Met: []? Not Met: []? Adjusted  3. Patient will demonstrate an increase in right shoulder Strength to 4/5 to allow for proper functional mobility as indicated by patients Functional Deficits. []? Progressing: []? Met: []? Not Met: []? Adjusted  4. Patient will return to functional activities including swimming without increased symptoms or restriction. []? Progressing: []? Met: []? Not Met: []? Adjusted  5. Patient to be able to perform self care /grooming activities with right shoulder w/o symptoms or restriction. []? Progressing: []? Met: []? Not Met: []? Adjusted          Overall Progression Towards Functional goals/ Treatment Progress Update:  [] Patient is progressing as expected towards functional goals listed. [] Progression is slowed due to complexities/Impairments listed. [] Progression has been slowed due to co-morbidities. [x] Plan just implemented, too soon to assess goals progression <30days   [] Goals require adjustment due to lack of progress  [] Patient is not progressing as expected and requires additional follow up with physician  [] Other    Persisting Functional Limitations/Impairments:  [x]Sleeping []Sitting               []Standing []Transfers        []Walking []Kneeling               []Stairs []Squatting / bending   []ADLs []Reaching  []Lifting  []Housework  []Driving []Job related tasks  []Sports/Recreation [x]Other: overhead movement/ self care         ASSESSMENT:   11/12: The patient is able to complete all AROM shoulder exercises with cues to reduce right upper trap compensation. The patient continues have limitations at end range with joint capsule tightness. Will continue right shoulder rehab per protocol as needed. 11/10:  The patient tolerated added  D2 shoulder extension, single rows,  and push plus for strengthening and scapular stabilization. Patient received scap mobs/ STM to right upper trap . Patient to further benefit from Shoulder rehab to restore near maximum mobility for ADLs. 11/4: The patient tolerated increase resistance with shoulder flex/abd with mild to moderate substitution in elevation of right shoulder. She demonstrated increase PROM in all planes after Grade 3-4 mobs. Patient will continue to benefit from further AROM and strengthening of right shoulder to restore near normal function. 10/28: The patient continues have upper trap tightness and restriction right shoulder abduction. Patient tolerated add IR strap stretch. Shoulder elevation greater than 90 deg continues to be struggle due to capsule tightness and muscle weakness. Patient to further benefit from PT to further improve right shoulder AROM. Treatment/Activity Tolerance:  [] Patient able to complete tx [] Patient limited by fatigue  [] Patient limited by pain  [] Patient limited by other medical complications  [] Other:     Prognosis: [] Good [] Fair  [] Poor    Patient Requires Follow-up: [x] Yes  [] No    Plan for next treatment session: Continue AROM and progressing strengthening     PLAN: updated  PT 2x / week for 4 weeks. [x] Continue per plan of care [] Alter current plan (see comments)  [] Plan of care initiated [] Hold pending MD visit [] Discharge    Electronically signed by: Ok Miller PT, DPT    Note: If patient does not return for scheduled/ recommended follow up visits, this note will serve as a discharge from care along with most recent update on progress.

## 2020-11-17 ENCOUNTER — HOSPITAL ENCOUNTER (OUTPATIENT)
Dept: PHYSICAL THERAPY | Age: 60
Setting detail: THERAPIES SERIES
Discharge: HOME | End: 2020-11-17
Payer: MEDICARE

## 2020-11-17 PROCEDURE — 97530 THERAPEUTIC ACTIVITIES: CPT

## 2020-11-17 PROCEDURE — 97110 THERAPEUTIC EXERCISES: CPT

## 2020-11-17 NOTE — PROGRESS NOTES
period:  Beginnin20  Ending:10/22/20    Progress report due (10 Rx/or 30 days whichever is less): visit #72     Recertification due (POC duration/ or 90 days whichever is less):     Visit # Insurance Allowable Auth required?  Date Range   +   Mn []  Yes  [x]  No Na     Latex Allergy:  [x]NO      []YES  Preferred Language for Healthcare:   [x]English       []other:    Functional Scale:   Quick Dash: raw score= 22; dysfunction =25%      Date assessed:  Quick Dash: raw score = 31; dysfunction = 56%  20    Pain level:  4/10     SUBJECTIVE:  Week 13 : : Patient reports that she has   OBJECTIVE:            PROM AROM     L R L R   Cervical Flexion  [facility] WFL   Cervical Extension  [facility] WFL   Cervical Rotation [facility] WFL WFL   Cervical Side-bend [facility] WFL   Shoulder Flexion    130   117 vs 162   Shoulder Abduction    85   74 vs 162   Shoulder External Rotation    68      Strength (0-5) Left Right    Shoulder Shrug (C4) 4+ 4-   Shoulder Flex 5 4-   Shoulder Abd (C5) 5 4-   Shoulder ER 5 4-   Shoulder IR 5 4-   Biceps (C6) 5     Triceps (C7) 5     Lats       Middle Trap       Rhomboids       Lower Trap        Pronation  3+ 4   Supination  3+ 4   Wrist Flex (C7) 3+ 4   Wrist Ext (C6) 3+ 4   Wrist Radial Deviation 3+ 4   Wrist Ulnar Deviation 3+ 4             10/8: Lightheaded    : moderate antalgic gait on R foot       RESTRICTIONS/PRECAUTIONS:  Following Total Shoulder protocol ( Junior's) Avoiding IR     Exercises/Interventions:     Therapeutic Exercises (32261) Resistance / level Sets/sec Reps Notes   Pulleys to 90 deg flexion/scap 2  min      Ts        Scapular retraction   10x    Mid rows  High rows  Bicep curls-  LPD    Tricep extension w/    Shoulder Add  Diagonal down D2  Single Arm Row  Blue /FM #25      5#    [place] #4    CC#5  CC#5  CC#15 2  2  2  2   2  2    2 10x  10x  10x  10x  10x  10x    11/12 one set #30    Added    Wand exercises;  t  Supine Flexion/abd  to 120 deg  ER @ 45 deg  Chest press   Supine:  Flexion / abd      5  15             Added 10/8   IR Strap    Added 10/28   Door way stretch     Added 11/10     Ball Rolls ( Red swiss ball) Flexion        Added 10/6   Shoulder AROM:   Flexion/ abd   Shelf reaching (2nd shelf)    #5 10x    10x Added 10/13   Push Plus    10xAdded    (Seated) scaption   Added 10/6 emphasis to reduce upper trap compensation    UBE  2/2 fwd/bwd      Therapeutic Activities (97046)       Assessment        Lifting 30# crate    10x                         Neuromuscular Re-ed (68710)                                                 Manual Intervention (80320)       PROM all planes, G2 mobs infer/pos,  , Right scap G2 mobs                                              Pt. Education:  -patient educated on diagnosis, prognosis and expectations for rehab  -all patient questions were answered    Home Exercise Program:   Access Code: [email or web address]   URL: [email or web address]   Date: 09/08/2020   Prepared by: Eloise Son     Exercises   Seated Shoulder Flexion Slide at Table Top with Forearm in Neutral - 10 reps - 3 sets - 3 hold - 1x daily - 7x weekly         Therapeutic Exercise and NMR EXR  [] (95077) Provided verbal/tactile cueing for activities related to strengthening, flexibility, endurance, ROM for improvements in  [] LE / Lumbar: LE, proximal hip, and core control with self care, mobility, lifting, ambulation. [] UE / Cervical: cervical, postural, scapular, scapulothoracic and UE control with self care, reaching, carrying, lifting, house/yardwork, driving, computer work.  [] (24770) Provided verbal/tactile cueing for activities related to improving balance, coordination, kinesthetic sense, posture, motor skill, proprioception to assist with   [] LE / lumbar: LE, proximal hip, and core control in self care, mobility, lifting, ambulation and eccentric single leg control.    [] UE / cervical: cervical, scapular, self-care, mobility, lifting and ambulation/stair navigation   [] UE / Cervical: cervical, postural, scapular, scapulothoracic and UE control with self care, reaching, carrying, lifting, house/yardwork, driving, computer work  [] (55096)Reviewed/Progressed HEP activities related to improving balance, coordination, kinesthetic sense, posture, motor skill, proprioception of   [] LE: core, proximal hip and LE for self care, mobility, lifting, and ambulation/stair navigation    [] UE / Cervical: cervical, postural,  scapular, scapulothoracic and UE control with self care, reaching, carrying, lifting, house/yardwork, driving, computer work    Manual Treatments:  PROM / STM / Oscillations-Mobs:  G-I, II, III, IV (PA's, Inf., Post.)  [x] (35580) Provided manual therapy to mobilize LE, proximal hip and/or LS spine soft tissue/joints for the purpose of modulating pain, promoting relaxation,  increasing ROM, reducing/eliminating soft tissue swelling/inflammation/restriction, improving soft tissue extensibility and allowing for proper ROM for normal function with   [] LE / lumbar: self care, mobility, lifting and ambulation. [] UE / Cervical: self care, reaching, carrying, lifting, house/yardwork, driving, computer work. Modalities:  [] (07912) Vasopneumatic compression: Utilized vasopneumatic compression to decrease edema / swelling for the purpose of improving mobility and quad tone / recruitment which will allow for increased overall function including but not limited to self-care, transfers, ambulation, and ascending / descending stairs.        Modalities: 9/8, 9/10, 9/15, 9/23, 10/6, 10/8, 10/13, 10/15, 10/20, 10/22, 10/26, 10/28, 11/4 CP to Right shoulder supine with bolster underneath BLEs    Charges:  Timed Code Treatment Minutes: 36   Total Treatment Minutes: 36     [] EVAL - LOW (67927)   [] EVAL - MOD (61680)  [] EVAL - HIGH (37829)  [] RE-EVAL (37368)  [x] NL(76680) x  1     [] Ionto  [] NMR (62259) x [] Vaso  [] Manual (78302) x      [] Ultrasound  [x] TA x  1       [] Mech Traction (07721)  [] Aquatic Therapy x     [] ES (un) (86370):   [] Home Management Training x  [] ES(attended) (17773)   [] Dry Needling 1-2 muscles (92623):  [] Dry Needling 3+ muscles (684849)  [] Group:      [] Other:     GOALS:  Patient stated goal: To get back to swimming   []? Progressing: []? Met: []? Not Met: []? Adjusted     Therapist goals for Patient:   Short Term Goals: To be achieved in: 2 weeks  1. Independent in HEP and progression per patient tolerance, in order to prevent re-injury. []? Progressing: [x]? Met: []? Not Met: []? Adjusted  2. Patient will have a decrease in pain to facilitate improvement in movement, function, and ADLs as indicated by Functional Deficits. []? Progressing: [x]? Met: []? Not Met: []? Adjusted     Long Term Goals: To be achieved in: 6 weeks/ DC   1. Disability index score of 30% or less for the  Quick DASH to assist with reaching prior level of function. []? Progressing: [x]? Met: []? Not Met: []? Adjusted  2. Patient will demonstrate increased AROM to right shoulder by 15 deg to allow for proper joint functioning as indicated by patients Functional Deficits. []? Progressing: [x]? Met: []? Not Met: []? Adjusted  3. Patient will demonstrate an increase in right shoulder Strength to 4/5 to allow for proper functional mobility as indicated by patients Functional Deficits. []? Progressing: [x]? Met: []? Not Met: []? Adjusted  4. Patient will return to functional activities including swimming without increased symptoms or restriction. [x]? Progressing: [x]? Met: []? Not Met: []? Adjusted  5. Patient to be able to perform self care /grooming activities with right shoulder w/o symptoms or restriction. []? Progressing: [x]? Met: []? Not Met: []?  Adjusted          Overall Progression Towards Functional goals/ Treatment Progress Update:  [] Patient is progressing as expected towards functional goals listed. [] Progression is slowed due to complexities/Impairments listed. [] Progression has been slowed due to co-morbidities. [x] Plan just implemented, too soon to assess goals progression <30days   [] Goals require adjustment due to lack of progress  [] Patient is not progressing as expected and requires additional follow up with physician  [] Other    Persisting Functional Limitations/Impairments:  [x]Sleeping []Sitting               []Standing []Transfers        []Walking []Kneeling               []Stairs []Squatting / bending   []ADLs []Reaching  []Lifting  []Housework  []Driving []Job related tasks  []Sports/Recreation [x]Other: overhead movement/ self care         ASSESSMENT:   11/17/20: The patient has made marked improvement with AROM and strength towards functional goals. She is able to complete all ADLs with restriction. Patient demonstrated lifting 35# crate from the floor to table top without any issues. She will be discharged with HEP.     11/12: The patient is able to complete all AROM shoulder exercises with cues to reduce right upper trap compensation. The patient continues have limitations at end range with joint capsule tightness. Will continue right shoulder rehab per protocol as needed. Treatment/Activity Tolerance:  [] Patient able to complete tx [] Patient limited by fatigue  [] Patient limited by pain  [] Patient limited by other medical complications  [] Other:     Prognosis: [] Good [] Fair  [] Poor    Patient Requires Follow-up: [x] Yes  [] No    Plan for next treatment session: Continue AROM and progressing strengthening     PLAN: updated  PT 2x / week for 4 weeks.    [x] Continue per plan of care [] Alter current plan (see comments)  [] Plan of care initiated [] Hold pending MD visit [] Discharge    Electronically signed by: Bruce Lynch PT, DPT    Note: If patient does not return for scheduled/ recommended follow up visits, this note will serve as a discharge from care along with most recent update on progress.

## 2020-11-19 ENCOUNTER — APPOINTMENT (OUTPATIENT)
Dept: PHYSICAL THERAPY | Age: 60
End: 2020-11-19
Payer: MEDICARE

## 2020-11-20 ENCOUNTER — APPOINTMENT (OUTPATIENT)
Dept: PHYSICAL THERAPY | Age: 60
End: 2020-11-20
Payer: MEDICARE

## 2020-12-04 ENCOUNTER — TELEPHONE (OUTPATIENT)
Dept: INTERNAL MEDICINE CLINIC | Age: 60
End: 2020-12-04

## 2020-12-04 NOTE — TELEPHONE ENCOUNTER
----- Message from JOSE Lopez CNP sent at 12/3/2020 10:53 AM EST -----  I previously referred her to [facility]. Not sure if it is with him or not. But that would be my best guess.   ----- Message -----  From: Migel Wen MA  Sent: 12/3/2020  10:46 AM EST  To: JOSE Lopez CNP    Do you know where this was done at?  ----- Message -----  From: Eugenie Mosher, [address] [place] [address] Street: 12/2/2020  To: Migel Wen MA    Colonoscopy results - please request. Had colonoscopy on 11/25

## 2020-12-07 ENCOUNTER — OFFICE VISIT (OUTPATIENT)
Dept: PRIMARY CARE CLINIC | Age: 60
End: 2020-12-07
Payer: MEDICARE

## 2020-12-07 PROCEDURE — 99211 OFF/OP EST MAY X REQ PHY/QHP: CPT | Performed by: NURSE PRACTITIONER

## 2020-12-07 PROCEDURE — G8417 CALC BMI ABV UP PARAM F/U: HCPCS | Performed by: NURSE PRACTITIONER

## 2020-12-07 PROCEDURE — G8428 CUR MEDS NOT DOCUMENT: HCPCS | Performed by: NURSE PRACTITIONER

## 2020-12-07 NOTE — PROGRESS NOTES
Myke Coronado received a viral test for COVID-19. They were educated on isolation and quarantine as appropriate. For any symptoms, they were directed to seek care from their PCP, given contact information to establish with a doctor, directed to an urgent care or the emergency room.

## 2020-12-08 ENCOUNTER — TELEPHONE (OUTPATIENT)
Dept: INTERNAL MEDICINE CLINIC | Age: 60
End: 2020-12-08

## 2020-12-08 LAB — SARS-COV-2, NAA: DETECTED

## 2020-12-08 NOTE — TELEPHONE ENCOUNTER
Patient states she tested positive covid 23 yesterday. She has a lot of questions and asked if Calla Burkitt or MA could call her back today.

## 2020-12-08 NOTE — TELEPHONE ENCOUNTER
Pt. Called covid line and got questions answered no concerns and will call if she needs anything else

## 2021-01-08 ENCOUNTER — OFFICE VISIT (OUTPATIENT)
Dept: PSYCHOLOGY | Age: 61
End: 2021-01-08
Payer: MEDICARE

## 2021-01-08 DIAGNOSIS — F43.21 ADJUSTMENT DISORDER WITH DEPRESSED MOOD: Primary | ICD-10-CM

## 2021-01-08 PROCEDURE — 1036F TOBACCO NON-USER: CPT | Performed by: PSYCHOLOGIST

## 2021-01-08 PROCEDURE — 90832 PSYTX W PT 30 MINUTES: CPT | Performed by: PSYCHOLOGIST

## 2021-01-08 NOTE — PROGRESS NOTES
Behavioral Health Consultation  Rui Sheffield, Ph.D.  Psychologist  2021  9:02 AM      Time spent with Patient: 28 minutes  This is patient's second  [facility] appointment. Reason for Consult:    Chief Complaint   Patient presents with    Depression       Feedback given to PCP. S:  Pt seen for f/u of depression. Pt reported \"stressful\" mood and sxs. Pt and  both had COVID,  was in hospital for 3 days, extended family currently has COVID. Difficult An \"as long as I stayed busy, I was okay, but when all of it was over w. Lesle Gulling Lesle Gulling \" Insightful she is staying busy in order to avoid experiencing grief. Stated she keeps her mind occupied, but struggling w motivation. Noted she struggles to do her regular morning routine w hygiene and her daily tasks. Noted she typically cleans 1 rm/day, does dishes, and laundry each day. 2-3 days/wk she does not do any of it d/t amotivation. Estimates this pattern presented 8-12 mos ago. PT allotment , no longer doing exercise other days. Discussed behavioral activation and made plan, provided tracking sheet. This note will not be viewable in [employer] for the following reason(s). Patient request to not have note available in [employer].       O:  MSE:    Appearance    alert, cooperative  Appetite normal  Sleep disturbance Yes  Fatigue Yes  Loss of pleasure Yes  Impulsive behavior No  Speech    spontaneous, normal rate, normal volume and well articulated  Mood    Depressed  Affect    depressed affect  Thought Content    intact and cognitive distortions  Thought Process    linear, goal directed and coherent  Associations    logical connections  Insight    Fair  Judgment    Intact  Orientation    oriented to person, place, time, and general circumstances  Memory    recent and remote memory intact  Attention/Concentration    impaired  Morbid ideation No  Suicide Assessment    no suicidal ideation    History:  Social History:   Social History Socioeconomic History    Marital status:      Spouse name: Not on file    Number of children: Not on file    Years of education: Not on file    Highest education level: Not on file   Occupational History    Not on file   Social Needs    Financial resource strain: Not hard at all    Food insecurity     Worry: Never true     Inability: Never true   [employer] needs     Medical: No     Non-medical: No   Tobacco Use    Smoking status: Never Smoker    Smokeless tobacco: Never Used   Substance and Sexual Activity    Alcohol use: Yes     Comment: 2 drinks per month    Drug use: No    Sexual activity: Not on file   Lifestyle    Physical activity     Days per week: Not on file     Minutes per session: Not on file    Stress: Not on file   Relationships    Social connections     Talks on phone: Not on file     Gets together: Not on file     Attends Nondenominational service: Not on file     Active member of club or organization: Not on file     Attends meetings of clubs or organizations: Not on file     Relationship status: Not on file    Intimate partner violence     Fear of current or ex partner: Not on file     Emotionally abused: Not on file     Physically abused: Not on file     Forced sexual activity: Not on file   Other Topics Concern    Not on file   Social History Narrative    Not on file     TOBACCO:   reports that she has never smoked. She has never used smokeless tobacco.  ETOH:   reports current alcohol use. Diagnosis:  Adjustment Disorder w Depressed mood    Plan:  Pt interventions:  Discussed and set plan for behavioral activation        Documentation was done using voice recognition dragon software. Every effort was made to ensure accuracy; however, inadvertent, unintentional computerized transcription errors may be present.

## 2021-01-08 NOTE — PATIENT INSTRUCTIONS
1. Move your hand weights next to your machine  2. Use you massage machine for exercise  3.  March during commercial breaks

## 2021-02-01 ENCOUNTER — OFFICE VISIT (OUTPATIENT)
Dept: INTERNAL MEDICINE CLINIC | Age: 61
End: 2021-02-01
Payer: MEDICARE

## 2021-02-01 ENCOUNTER — NURSE TRIAGE (OUTPATIENT)
Dept: OTHER | Facility: CLINIC | Age: 61
End: 2021-02-01

## 2021-02-01 ENCOUNTER — HOSPITAL ENCOUNTER (OUTPATIENT)
Dept: GENERAL RADIOLOGY | Age: 61
Discharge: HOME | End: 2021-02-01
Payer: MEDICARE

## 2021-02-01 ENCOUNTER — HOSPITAL ENCOUNTER (OUTPATIENT)
Age: 61
Discharge: HOME | End: 2021-02-01
Payer: MEDICARE

## 2021-02-01 VITALS
DIASTOLIC BLOOD PRESSURE: 78 MMHG | HEART RATE: 88 BPM | BODY MASS INDEX: 48.32 KG/M2 | SYSTOLIC BLOOD PRESSURE: 128 MMHG | TEMPERATURE: 96.3 F | OXYGEN SATURATION: 99 % | WEIGHT: 272.8 LBS

## 2021-02-01 DIAGNOSIS — M54.42 ACUTE LEFT-SIDED LOW BACK PAIN WITH LEFT-SIDED SCIATICA: ICD-10-CM

## 2021-02-01 DIAGNOSIS — M47.26 OSTEOARTHRITIS OF SPINE WITH RADICULOPATHY, LUMBAR REGION: ICD-10-CM

## 2021-02-01 DIAGNOSIS — M79.7 FIBROMYALGIA: ICD-10-CM

## 2021-02-01 DIAGNOSIS — M25.552 LEFT HIP PAIN: ICD-10-CM
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
## 2021-02-01 DIAGNOSIS — M79.18 MUSCLE PAIN, LUMBAR: ICD-10-CM

## 2021-02-01 PROCEDURE — 99214 OFFICE O/P EST MOD 30 MIN: CPT | Performed by: NURSE PRACTITIONER

## 2021-02-01 PROCEDURE — G8427 DOCREV CUR MEDS BY ELIG CLIN: HCPCS | Performed by: NURSE PRACTITIONER

## 2021-02-01 PROCEDURE — G8484 FLU IMMUNIZE NO ADMIN: HCPCS | Performed by: NURSE PRACTITIONER

## 2021-02-01 PROCEDURE — G8417 CALC BMI ABV UP PARAM F/U: HCPCS | Performed by: NURSE PRACTITIONER

## 2021-02-01 PROCEDURE — 73502 X-RAY EXAM HIP UNI 2-3 VIEWS: CPT

## 2021-02-01 PROCEDURE — 1036F TOBACCO NON-USER: CPT | Performed by: NURSE PRACTITIONER

## 2021-02-01 PROCEDURE — 3017F COLORECTAL CA SCREEN DOC REV: CPT | Performed by: NURSE PRACTITIONER

## 2021-02-01 PROCEDURE — 72100 X-RAY EXAM L-S SPINE 2/3 VWS: CPT

## 2021-02-01 RX ORDER — CYCLOBENZAPRINE HCL 10 MG
10 TABLET ORAL 3 TIMES DAILY PRN
Qty: 30 TABLET | Refills: 0 | Status: SHIPPED | OUTPATIENT
Start: 2021-02-01 | End: 2021-02-11

## 2021-02-01 NOTE — PROGRESS NOTES
2/1/21     Chief Complaint   Patient presents with   Faith Werner Fall     pt states she slid into her fireplace last night and fell on her left side, says her hip/back and everything around there hurts    Discuss Medications     pt states her medications are making her feel off, would like to be taken off some if possible     HPI     Pt is here for an acute visit  She had a mechanical trip late last night and slid onto the fireplace, landed on left hip/ lower back. Having hip and lower back pain - having increased muscle spasms   Has chronic back pain that has been exacerbated. Moving around a little which helped loosen up the hip and back  Taking tylenol - [facility]   Using some heat with minimal relief     Allergies   Allergen Reactions    Latex Rash    Statins Nausea Only     Current Outpatient Medications   Medication Sig Dispense Refill    cyclobenzaprine (FLEXERIL) 10 MG tablet Take 1 tablet by mouth 3 times daily as needed for Muscle spasms 30 tablet 0    montelukast (SINGULAIR) 10 MG tablet Take 1 tablet by mouth nightly 90 tablet 1    levothyroxine (SYNTHROID) 112 MCG tablet Take 1 tablet by mouth daily 90 tablet 1    cetirizine (ZYRTEC) 10 MG tablet Take 1 tablet by mouth daily 90 tablet 1    colestipol (COLESTID) 1 g tablet Take 1 tablet by mouth 2 times daily 180 tablet 1    rivaroxaban (XARELTO) 20 MG TABS tablet Take 1 tablet by mouth Daily with supper 90 tablet 1    metoprolol succinate (TOPROL XL) 25 MG extended release tablet Take 0.5 tablets by mouth daily 180 tablet 0    vitamin D (ERGOCALCIFEROL) 1.25 MG (49853 UT) CAPS capsule Take 1 capsule by mouth every 14 days 6 capsule 1    gabapentin (NEURONTIN) 300 MG capsule Take 2 capsules by mouth 3 times daily for 90 days. (Patient taking differently: Take 600 mg by mouth as needed (as needed for pain).  ) 540 capsule 0  acetaminophen (TYLENOL 8 HOUR) 650 MG extended release tablet Take 650 mg by mouth every 8 hours as needed for Pain      DULoxetine (CYMBALTA) 60 MG extended release capsule Take 1 capsule by mouth daily (Patient not taking: Reported on 2/1/2021) 90 capsule 1     No current facility-administered medications for this visit. Review of Systems   Negative other than HPI     Vitals:    02/01/21 1611   BP: 128/78   Pulse: 88   Temp: 96.3 °F (35.7 °C)   SpO2: 99%   Weight: 272 lb 12.8 oz (123.7 kg)      Physical Exam  Constitutional:       General: She is not in acute distress. Appearance: Normal appearance. She is not ill-appearing. HENT:      Head: Normocephalic and atraumatic. Pulmonary:      Effort: Pulmonary effort is normal. No respiratory distress. Musculoskeletal:      Left hip: She exhibits decreased range of motion, decreased strength and tenderness. She exhibits no swelling and no deformity. Lumbar back: She exhibits decreased range of motion, tenderness and spasm. She exhibits no swelling and no deformity. Right lower leg: No edema. Left lower leg: No edema. Neurological:      General: No focal deficit present. Mental Status: She is alert and oriented to person, place, and time. Mental status is at baseline. Psychiatric:         Mood and Affect: Mood normal.         Behavior: Behavior normal.       Assessment/Plan:  1. Fall, initial encounter  Stable, uncontrolled. Chronic pain exacerbated by fall. Checking xrays today   Sending over flexeril for muscle spasms  Continue tylenol   Not able to take nsaids due to xarelto   Restart duloxetine   Continue gabapentin   - XR LUMBAR SPINE (2-3 VIEWS); Future  - XR HIP 2-3 VW W PELVIS LEFT; Future    2. Osteoarthritis of spine with radiculopathy, lumbar region  See 1  - XR LUMBAR SPINE (2-3 VIEWS); Future  - XR HIP 2-3 VW W PELVIS LEFT; Future    3.  Acute left-sided low back pain with left-sided sciatica  See 1 - cyclobenzaprine (FLEXERIL) 10 MG tablet; Take 1 tablet by mouth 3 times daily as needed for Muscle spasms  Dispense: 30 tablet; Refill: 0    4. Left hip pain  See 1  - cyclobenzaprine (FLEXERIL) 10 MG tablet; Take 1 tablet by mouth 3 times daily as needed for Muscle spasms  Dispense: 30 tablet; Refill: 0    5. Muscle pain, lumbar  See 1  - cyclobenzaprine (FLEXERIL) 10 MG tablet; Take 1 tablet by mouth 3 times daily as needed for Muscle spasms  Dispense: 30 tablet; Refill: 0    6. Fibromyalgia  See 1    Discussed medications with patient, who voiced understanding of their use and indications. All questions answered.     Follow up criteria reviewed     Electronically signed by JOSE Underwood CNP on 2/1/2021 at 4:32 PM

## 2021-02-02 NOTE — PROGRESS NOTES
[address]   Electrophysiology Follow up  Date: 2/4/2021      Chief Complaint   Patient presents with    Follow-up        HPI: Carol Chappell is a 61 y.o. female with a PMH of PAF, anxiety, asthma, HLD, depression, slep apnea and HTN. She went to urgent care and her ecg showed atrial fibrillation. A monitor was ordered which revealed afib RVR 25% burden    A/p afib ablation 10/2/2020    10/08/2020 presents to the office for urgent appointment. She feels at night that her blood pressure and heart rate are elevated at night time the 150 range. Her BP and heart rate are well controlled today though she has frequent PAC's. We discussed placing a holter monitor to determine what rhythm she is having. Holter Monitor 10/08/2020 to 10/12/2020 Showed Atrial fibrillation burden 26.5% atrial flutter burden  0.9%. Rate controlled. Interval  History:  Valerie Angel presents today to follow up after her ablations. States she has had no further palpitations since last visit. Her heart rate also has been steady. She is trying to lose weight. Past Medical History:   Diagnosis Date    A-fib ([address]     Anxiety     Asthma     Back pain     Depression     Fibromyalgia     Hyperlipidemia     Hypertension     Migraines     Prediabetes     Unspecified sleep apnea     Urinary incontinence         Past Surgical History:   Procedure Laterality Date    BREAST ENHANCEMENT SURGERY      COLONOSCOPY N/A 11/27/2019    COLONOSCOPY POLYPECTOMY SNARE/COLD BIOPSY performed by Louie Leung MD at [address] [facility] [place] SURGERY Left 2017    TONSILLECTOMY  1968    TUBAL LIGATION         Allergies: Allergies   Allergen Reactions    Latex Rash    Statins Nausea Only       Social History:   reports that she has never smoked. She has never used smokeless tobacco. She reports current alcohol use. She reports that she does not use drugs.      Family History: family history includes Arthritis in her father and mother; Breast Cancer in her maternal grandmother; COPD in her brother, father, and mother; Cancer in her father, maternal uncle, mother, and other family members; Diabetes in her mother; Glaucoma in her maternal grandmother; Heart Disease in her mother; Ovarian Cancer in her mother; Stroke in her brother and mother. Reviewed. Denies family history of sudden cardiac death, arrhythmia, premature CAD    Review of System:  All other systems reviewed and are negative except for that noted above. Pertinent negatives are:   General: negative for fever, chills   Ophthalmic ROS: negative for - eye pain or loss of vision  ENT ROS: negative for - headaches, sore throat   Respiratory: negative for - cough, sputum  Cardiovascular: Reviewed in HPI  Gastrointestinal: negative for - abdominal pain, diarrhea, N/V  Hematology: negative for - bleeding, blood clots, bruising or jaundice  Genito-Urinary:  negative for - Dysuria or incontinence  Musculoskeletal: negative for - Joint swelling, muscle pain  Neurological: negative for - confusion, dizziness, headaches   Psychiatric: No anxiety, no depression. Dermatological: negative for - rash    Physical Examination:  Vitals:    02/04/21 0934   BP: 112/80   Pulse: 69   SpO2: 95%      Wt Readings from Last 3 Encounters:   02/04/21 269 lb 9.6 oz (122.3 kg)   02/01/21 272 lb 12.8 oz (123.7 kg)   11/11/20 270 lb (122.5 kg)       · Constitutional: Oriented. No distress. · Head: Normocephalic and atraumatic. · Mouth/Throat: Oropharynx is clear and moist.   · Eyes: Conjunctivae normal. EOM are normal.   · Neck: Neck supple. No rigidity. No JVD present. · Cardiovascular: Normal rate, regular rhythm, S1&S2. · Pulmonary/Chest: Bilateral respiratory sounds. No wheezes, No rhonchi. · Abdominal: Soft. Bowel sounds present. No distension, No tenderness. · Musculoskeletal: No tenderness.  No edema  Limited Rt shoulder movement · Lymphadenopathy: Has no cervical adenopathy. · Neurological: Alert and oriented. Cranial nerve appears intact, No Gross deficit   · Skin: Skin is warm and dry. No rash noted. · Psychiatric: Has a normal behavior       Labs, diagnostic and imaging results reviewed. Reviewed. Lab Results   Component Value Date    TSH 0.35 2020    TSH 2.90 10/23/2019    CREATININE 0.7 10/02/2020    CREATININE 0.8 2020    AST 16 10/02/2020    ALT 21 10/02/2020       EC2021 Sinus Rhythm    CT angio 3/18/19  FINDINGS FOR CORONARY CT ANGIOGRAM  CARDIAC FINDINGS:  Pericardium:  Grossly unremarkable  Left atrium:  Grossly unremarkable  Left ventricle:  Grossly unremarkable  Right atrium:  Grossly unremarkable  Right ventricle:  Grossly unremarkable  Mitral valve:  Grossly unremarkable  Aortic valve:  Grossly unremarkable    CORONARY ARTERIES:  Dominance:  Left  Left main:  Unremarkable  LAD and diagonals:  Unremarkable  LCx and OM:  Unremarkable  RCA:  Unremarkable  There is no evidence of myocardial bridge, coronary aneurysm, or coronary artery anomaly    (The following structures were not completely included on this scan)  LUNGS/AIRWAYS:  Unremarkable  PLEURA: Unremarkable  MEDIASTINUM/JAIME:  Unremarkable  AORTA:  Minimal atherosclerotic calcifications  CHEST WALL/LOWER NECK:  Unremarkable  UPPER ABDOMEN:  Unremarkable  BONES:  Mild degenerative changes of the spine  OTHER:  None    IMPRESSION      Total calcium score is 0 which corresponds to no identifiable coronary calcification and less than 10th percentile for age and sex    Normal CT angiogram of coronary arteries    Echo: 2018  Summary:  The left ventricular wall motion is normal.  Overall left ventricular ejection fraction is estimated to be 55-60%. Right ventricular systolic pressure is indeterminate due to poorly  visualized tricuspid regurgitation.   Poor endocardial border visualization HTN  Vitals:    02/04/21 0934   BP: 112/80   Pulse: 69   SpO2: 95%   -Well controlled today  -Home BP monitoring encourage with a BP goal <130/80    Obesity  Body mass index is 47.76 kg/m². - Excessive weight is complicating assessment and treatment. It is placing patient at risk for multiple co-morbidities as well as early death and contributing to the patient's presentation. - discussed weight management with diet and exercise   -She tries to stay active with walking and water aerobics    TONY  New Mask- wearing 4 to 6 hours nightly   -encouraged compliance with CPAP    Plan  Continue working on weight loss. Repeat cardiac monitor in July 7 day holter and follow-up with NP. She can follow-up with me after that in 6 months. - The patient is counseled to follow a low salt diet to assure blood pressure remains controlled for cardiovascular risk factor modification.   - The patient is counseled to avoid excess caffeine, and energy drinks as this may exacerbated ectopy and arrhythmia. - The patient is counseled to get regular exercise 3-5 times per week to control cardiovascular risk factors. - The patient is counseled to lose weigt to control cardiovascular risk factors. Thank you for allowing me to participate in the care of Holly Khan. Further evaluation will be based upon the patient's clinical course and testing results. All questions and concerns were addressed to the patient/family. Alternatives to my treatment were discussed. I have discussed the above stated plan and the patient verbalized understanding and agreed with the plan. Scribe attestation:  This note was scribed in the presence of Dorian Richter MD by Ghanshyam [place], RN    I, Dr. Dorian Richter personally performed the services described in this documentation as scribed by RN in my presence, and it is both accurate and complete.   NOTE: This report was transcribed using voice recognition software. Every effort was made to ensure accuracy, however, inadvertent computerized transcription errors may be present.        Olu Koch MD, Jeremy Gauthier [address] [place] [address]   Office: [phone]

## 2021-02-04 ENCOUNTER — OFFICE VISIT (OUTPATIENT)
Dept: CARDIOLOGY CLINIC | Age: 61
End: 2021-02-04
Payer: MEDICARE

## 2021-02-04 VITALS
OXYGEN SATURATION: 95 % | SYSTOLIC BLOOD PRESSURE: 112 MMHG | DIASTOLIC BLOOD PRESSURE: 80 MMHG | BODY MASS INDEX: 47.77 KG/M2 | HEART RATE: 69 BPM | WEIGHT: 269.6 LBS | HEIGHT: 63 IN

## 2021-02-04 DIAGNOSIS — I48.0 PAF (PAROXYSMAL ATRIAL FIBRILLATION) (HCC): Primary | ICD-10-CM

## 2021-02-04 DIAGNOSIS — I10 ESSENTIAL HYPERTENSION: ICD-10-CM

## 2021-02-04 DIAGNOSIS — G47.39 OTHER SLEEP APNEA: ICD-10-CM

## 2021-02-04 DIAGNOSIS — E66.01 CLASS 3 SEVERE OBESITY DUE TO EXCESS CALORIES WITHOUT SERIOUS COMORBIDITY WITH BODY MASS INDEX (BMI) OF 45.0 TO 49.9 IN ADULT (HCC): ICD-10-CM

## 2021-02-04 PROCEDURE — 3017F COLORECTAL CA SCREEN DOC REV: CPT | Performed by: INTERNAL MEDICINE

## 2021-02-04 PROCEDURE — G8417 CALC BMI ABV UP PARAM F/U: HCPCS | Performed by: INTERNAL MEDICINE

## 2021-02-04 PROCEDURE — G8427 DOCREV CUR MEDS BY ELIG CLIN: HCPCS | Performed by: INTERNAL MEDICINE

## 2021-02-04 PROCEDURE — G8484 FLU IMMUNIZE NO ADMIN: HCPCS | Performed by: INTERNAL MEDICINE

## 2021-02-04 PROCEDURE — 1036F TOBACCO NON-USER: CPT | Performed by: INTERNAL MEDICINE

## 2021-02-04 PROCEDURE — 99214 OFFICE O/P EST MOD 30 MIN: CPT | Performed by: INTERNAL MEDICINE

## 2021-02-04 RX ORDER — METOPROLOL SUCCINATE 25 MG/1
12.5 TABLET, EXTENDED RELEASE ORAL DAILY
Qty: 180 TABLET | Refills: 0 | Status: SHIPPED | OUTPATIENT
Start: 2021-02-04 | End: 2021-04-06 | Stop reason: SDUPTHER

## 2021-02-05 PROCEDURE — 93000 ELECTROCARDIOGRAM COMPLETE: CPT | Performed by: INTERNAL MEDICINE

## 2021-02-16 ENCOUNTER — TELEPHONE (OUTPATIENT)
Dept: INTERNAL MEDICINE CLINIC | Age: 61
End: 2021-02-16

## 2021-02-16 NOTE — TELEPHONE ENCOUNTER
Pt called stating her Diverticulitis is getting worst this afternoon. Scheduled an appt to see you tomorrow.

## 2021-02-17 ENCOUNTER — OFFICE VISIT (OUTPATIENT)
Dept: INTERNAL MEDICINE CLINIC | Age: 61
End: 2021-02-17
Payer: MEDICARE

## 2021-02-17 VITALS
DIASTOLIC BLOOD PRESSURE: 86 MMHG | WEIGHT: 269 LBS | SYSTOLIC BLOOD PRESSURE: 124 MMHG | HEART RATE: 77 BPM | BODY MASS INDEX: 47.65 KG/M2 | TEMPERATURE: 96.4 F | OXYGEN SATURATION: 97 %

## 2021-02-17 DIAGNOSIS — R10.84 GENERALIZED ABDOMINAL PAIN: Primary | ICD-10-CM

## 2021-02-17 PROCEDURE — 3017F COLORECTAL CA SCREEN DOC REV: CPT | Performed by: NURSE PRACTITIONER

## 2021-02-17 PROCEDURE — G8417 CALC BMI ABV UP PARAM F/U: HCPCS | Performed by: NURSE PRACTITIONER

## 2021-02-17 PROCEDURE — G8427 DOCREV CUR MEDS BY ELIG CLIN: HCPCS | Performed by: NURSE PRACTITIONER

## 2021-02-17 PROCEDURE — 99213 OFFICE O/P EST LOW 20 MIN: CPT | Performed by: NURSE PRACTITIONER

## 2021-02-17 PROCEDURE — 1036F TOBACCO NON-USER: CPT | Performed by: NURSE PRACTITIONER

## 2021-02-17 PROCEDURE — G8484 FLU IMMUNIZE NO ADMIN: HCPCS | Performed by: NURSE PRACTITIONER

## 2021-02-17 RX ORDER — CIPROFLOXACIN 500 MG/1
500 TABLET, FILM COATED ORAL 2 TIMES DAILY
Qty: 20 TABLET | Refills: 0 | Status: SHIPPED | OUTPATIENT
Start: 2021-02-17 | End: 2021-02-27

## 2021-02-17 RX ORDER — ZINC GLUCONATE 50 MG
50 TABLET ORAL DAILY
COMMUNITY
End: 2022-07-27

## 2021-02-17 RX ORDER — METRONIDAZOLE 500 MG/1
500 TABLET ORAL 3 TIMES DAILY
Qty: 30 TABLET | Refills: 0 | Status: SHIPPED | OUTPATIENT
Start: 2021-02-17 | End: 2021-02-27

## 2021-02-17 RX ORDER — MAGNESIUM 200 MG
200 TABLET ORAL DAILY
COMMUNITY
End: 2022-07-27

## 2021-02-17 ASSESSMENT — ENCOUNTER SYMPTOMS
SHORTNESS OF BREATH: 0
BLOOD IN STOOL: 0
COUGH: 0
DIARRHEA: 0
NAUSEA: 1
ABDOMINAL PAIN: 1
VOMITING: 0
CONSTIPATION: 0

## 2021-02-17 NOTE — PATIENT INSTRUCTIONS
Patient Education        Diverticulosis: Care Instructions  Your Care Instructions  In diverticulosis, pouches called diverticula form in the wall of the large intestine (colon). The pouches do not cause any pain or other symptoms. Most people who have diverticulosis do not know they have it. But the pouches sometimes bleed, and if they become infected, they can cause pain and other symptoms. When this happens, it is called diverticulitis. Diverticula form when pressure pushes the wall of the colon outward at certain weak points. A diet that is too low in fiber can cause diverticula. Follow-up care is a key part of your treatment and safety. Be sure to make and go to all appointments, and call your doctor if you are having problems. It's also a good idea to know your test results and keep a list of the medicines you take. How can you care for yourself at home? · Include fruits, leafy green vegetables, beans, and whole grains in your diet each day. These foods are high in fiber. · Take a fiber supplement, such as Citrucel or Metamucil, every day if needed. Read and follow all instructions on the label. · Drink plenty of fluids, enough so that your urine is light yellow or clear like water. If you have kidney, heart, or liver disease and have to limit fluids, talk with your doctor before you increase the amount of fluids you drink. · Get at least 30 minutes of exercise on most days of the week. Walking is a good choice. You also may want to do other activities, such as running, swimming, cycling, or playing tennis or team sports. · Cut out foods that cause gas, pain, or other symptoms. When should you call for help?    Call your doctor now or seek immediate medical care if:    · You have belly pain.     · You pass maroon or very bloody stools.     · You have a fever.     · You have nausea and vomiting.     · You have unusual changes in your bowel movements or abdominal swelling.     · You have burning pain when you urinate.     · You have abnormal vaginal discharge.     · You have shoulder pain.     · You have cramping pain that does not get better when you have a bowel movement or pass gas.     · You pass gas or stool from your urethra while urinating. Watch closely for changes in your health, and be sure to contact your doctor if you have any problems. Where can you learn more? Go to [email or web address] and sign in to your Phraxis account. Enter X932 in the [facility] box to learn more about \"Diverticulosis: Care Instructions. \"     If you do not have an account, please click on the \"Sign Up Now\" link. Current as of: April 15, 2020               Content Version: 12.6  © 2006-2020 [employer]. Care instructions adapted under license by [facility] ([facility]). If you have questions about a medical condition or this instruction, always ask your healthcare professional. [place] [address] any warranty or liability for your use of this information. Patient Education        Diverticulitis: Care Instructions  Overview     Diverticulitis occurs when pouches form in the wall of the colon and become inflamed or infected. It can be very painful. Doctors aren't sure what causes diverticulitis. There is no proof that foods such as nuts, seeds, or berries cause it or make it worse. A low-fiber diet may cause the colon to work harder to push stool forward. Pouches may form because of this extra work. It may be hard to think about healthy eating while you're in pain. But as you recover, you might think about how you can use healthy eating for overall better health. Healthy eating may help you avoid future attacks. Follow-up care is a key part of your treatment and safety. Be sure to make and go to all appointments, and call your doctor if you are having problems. It's also a good idea to know your test results and keep a list of the medicines you take.   How can you care for yourself at home? · Drink plenty of fluids, enough so that your urine is light yellow or clear like water. If you have kidney, heart, or liver disease and have to limit fluids, talk with your doctor before you increase the amount of fluids you drink. · Stay with liquids or a bland diet (plain rice, bananas, dry toast or crackers, applesauce) until you are feeling better. Then you can return to regular foods and slowly increase the amount of fiber in your diet. · Use a heating pad set on low on your belly to relieve mild cramps and pain. · Get extra rest until you are feeling better. · Be safe with medicines. Read and follow all instructions on the label. ? If the doctor gave you a prescription medicine for pain, take it as prescribed. ? If you are not taking a prescription pain medicine, ask your doctor if you can take an over-the-counter medicine. · If your doctor prescribed antibiotics, take them as directed. Do not stop taking them just because you feel better. You need to take the full course of antibiotics. · Do not use laxatives or enemas unless your doctor tells you to use them. When should you call for help? Call your doctor now or seek immediate medical care if:    · You have a fever.     · You are vomiting.     · You have new or worse belly pain.     · You cannot pass stools or gas. Watch closely for changes in your health, and be sure to contact your doctor if you have any problems. Where can you learn more? Go to [email or web address] and sign in to your [employer]Bear App account. Enter H901 in the [facility] box to learn more about \"Diverticulitis: Care Instructions. \"     If you do not have an account, please click on the \"Sign Up Now\" link. Current as of: April 15, 2020               Content Version: 12.6  © 9865-7034 [employer]. Care instructions adapted under license by [facility] ([facility]).  If you have questions about a medical condition or this instruction, always ask your healthcare professional. [place] [address] any warranty or liability for your use of this information. Patient Education        metronidazole  Pronunciation:  ivan ames  Brand:  FIRST Metronidazole, Flagyl, Flagyl 375  What is the most important information I should know about metronidazole? You should not use metronidazole if you have taken disulfiram (Antabuse) within the past 2 weeks. Do not drink alcohol or consume foods or medicines that contain propylene glycol while you are taking metronidazole and for at least 3 days after you stop taking it. What is metronidazole? Metronidazole is an antibiotic that is used to treat bacterial infections of the vagina, stomach, liver, skin, joints, brain, and respiratory tract. Metronidazole will not treat a vaginal yeast infection. Metronidazole may also be used for purposes not listed in this medication guide. What should I discuss with my healthcare provider before taking metronidazole? You should not take metronidazole if you are allergic to it, or if you have taken disulfiram (Antabuse) within the past 2 weeks. Do not take metronidazole during the first trimester of pregnancy. This medicine can harm an unborn baby. Tell your doctor if you are pregnant. Tell your doctor if you have ever had:  · liver or kidney disease;  · Cockayne syndrome (a rare genetic disorder);  · a stomach or intestinal disease such as Crohn's disease;  · a blood cell disorder such as anemia (lack of red blood cells) or low white blood cell (WBC) counts;  · a fungal infection anywhere in your body; or  · a nerve disorder. In animal studies, metronidazole caused certain types of tumors, some of which were cancerous. However, very high doses are used in animal studies. It is not known whether these effects would occur in people using regular doses. Ask your doctor about your risk.   Metronidazole can pass into breast may have unpleasant side effects such as headaches, stomach cramps, nausea, vomiting, and flushing (warmth, redness, or tingly feeling). Avoid alcohol or propylene glycol for at least 3 days after you stop taking metronidazole. Check the labels of any medicines or food products you use to make sure they do not contain alcohol or propylene glycol. What are the possible side effects of metronidazole? Get emergency medical help if you have signs of an allergic reaction: hives; difficult breathing; swelling of your face, lips, tongue, or throat. Call your doctor at once if you have:  · diarrhea;  · painful or difficult urination;  · trouble sleeping, depression, irritability;  · headache, dizziness, weakness;  · a light-headed feeling (like you might pass out); or  · blisters or ulcers in your mouth, red or swollen gums, trouble swallowing. Stop taking the medicine and call your doctor right away if you have neurologic side effects (more likely to occur while taking metronidazole long term):  · numbness, tingling, or burning pain in your hands or feet;  · vision problems, pain behind your eyes, seeing flashes of light;  · muscle weakness, problems with coordination;  · trouble speaking or understanding what is said to you;  · a seizure; or  · fever, neck stiffness, and increased sensitivity to light. Metronidazole can cause life-threatening liver problems in people with Cockayne syndrome. If you have this condition, stop taking metronidazole and contact your doctor if you have signs of liver failure --nausea, stomach pain (upper right side), dark urine, jose-colored stools, or jaundice (yellowing of the skin or eyes). Side effects may be more likely in older adults.   Common side effects may include:  · nausea, vomiting, loss of appetite, stomach pain;  · diarrhea, constipation;  · unpleasant metallic taste;  · rash, itching;  · vaginal itching or discharge;  · mouth sores; or  · swollen, red, or \"hairy\" tongue. This is not a complete list of side effects and others may occur. Call your doctor for medical advice about side effects. You may report side effects to FDA at 4-801-FDA-3162. What other drugs will affect metronidazole? Sometimes it is not safe to use certain medications at the same time. Some drugs can affect your blood levels of other drugs you take, which may increase side effects or make the medications less effective. Tell your doctor about all your other medicines, especially:  · busulfan;  · lithium; or  · a blood thinner --warfarin, Coumadin, Jantoven. This list is not complete. Other drugs may affect metronidazole, including prescription and over-the-counter medicines, vitamins, and herbal products. Not all possible drug interactions are listed here. Where can I get more information? Your pharmacist can provide more information about metronidazole. Remember, keep this and all other medicines out of the reach of children, never share your medicines with others, and use this medication only for the indication prescribed. Every effort has been made to ensure that the information provided by [address]a Balderr[address] is accurate, up-to-date, and complete, but no guarantee is made to that effect. Drug information contained herein may be time sensitive. [facility] information has been compiled for use by healthcare practitioners and consumers in the [place] and therefore [facility] does not warrant that uses outside of the [place] are appropriate, unless specifically indicated otherwise. [facility]'s drug information does not endorse drugs, diagnose patients or recommend therapy. [facility] drug information is an informational resource designed to assist licensed healthcare practitioners in caring for their patients and/or to serve consumers viewing this service as a supplement to, and not a substitute for, the expertise, skill, knowledge and judgment of healthcare practitioners.  The absence bacterial infections. Ciprofloxacin is also used to treat people who have been exposed to anthrax or certain types of plague. Fluoroquinolone antibiotics can cause serious or disabling side effects that may not be reversible. Ciprofloxacin should be used only for infections that cannot be treated with a safer antibiotic. Ciprofloxacin may also be used for purposes not listed in this medication guide. What should I discuss with my healthcare provider before taking ciprofloxacin? You should not use ciprofloxacin if you are allergic to it, or if:  · you also take tizanidine; or  · you are allergic to other fluoroquinolones (gemifloxacin, levofloxacin, moxifloxacin, norfloxacin, ofloxacin, and others). Ciprofloxacin may cause swelling or tearing of a tendon (the fiber that connects bones to muscles in the body), especially in the Achilles' tendon of the heel. This can happen during treatment or up to several months after you stop taking ciprofloxacin. Tendon problems may be more likely in certain people (children and older adults, or people who use steroid medicine or have had an organ transplant). Tell your doctor if you have ever had:  · tendon problems, bone problems, arthritis, or other joint problems (especially in children);  · blood circulation problems, aneurysm, narrowing or hardening of the arteries;  · heart problems, high blood pressure;  · a genetic disease such as Marfan syndrome or Ehler's-Danlos syndrome;  · diabetes;  · a muscle or nerve disorder, such as myasthenia gravis;  · kidney disease;  · seizures or epilepsy;  · a head injury or brain tumor;  · long QT syndrome (in you or a family member); or  · low levels of potassium in your blood (hypokalemia). Do not give this medicine to a child without medical advice. It is not known whether this medicine will harm an unborn baby. Tell your doctor if you are pregnant. You should not breast-feed while using this medicine.   How should I take ciprofloxacin? Follow all directions on your prescription label and read all medication guides or instruction sheets. Use the medicine exactly as directed. You may take ciprofloxacin with or without food, at the same time each day. Shake the oral suspension (liquid) for 15 seconds before you measure a dose. Use the dosing syringe provided, or use a medicine dose-measuring device (not a kitchen spoon). Do not give ciprofloxacin oral suspension through a feeding tube. Swallow the extended-release tablet whole and do not crush, chew, or break it. Use this medicine for the full prescribed length of time, even if your symptoms quickly improve. Skipping doses can increase your risk of infection that is resistant to medication. Ciprofloxacin will not treat a viral infection such as the flu or a common cold. Do not share ciprofloxacin with another person. Store at room temperature away from moisture and heat. Do not allow the liquid medicine to freeze. Throw away any unused liquid after 14 days. What happens if I miss a dose? Take the medicine as soon as you can, but skip the missed dose if it is almost time for your next dose. Do not take two doses at one time. What happens if I overdose? Seek emergency medical attention or call the Poison Help line at 1-451.402.7181. What should I avoid while taking ciprofloxacin? Do not take ciprofloxacin with dairy products such as milk or yogurt, or with calcium- fortified juice. You may eat or drink these products with your meals, but do not use them alone when taking ciprofloxacin. They could make the medication less effective. Using caffeine while taking ciprofloxacin can increase the effects of the caffeine. Antibiotic medicines can cause diarrhea, which may be a sign of a new infection. If you have diarrhea that is watery or bloody, call your doctor before using anti-diarrhea medicine. Ciprofloxacin could make you sunburn more easily.  Avoid sunlight or tanning bloody;  · fast or pounding heartbeats, fluttering in your chest, shortness of breath, and sudden dizziness (like you might pass out);  · the first sign of any skin rash, no matter how mild;  · muscle weakness, breathing problems;  · little or no urination;  · jaundice (yellowing of the skin or eyes); or  · increased pressure inside the skull --severe headaches, ringing in your ears, dizziness, nausea, vision problems, pain behind your eyes. Common side effects may include:  · nausea, vomiting, diarrhea, stomach pain;  · vaginal itching or discharge;  · headache; or  · abnormal liver function tests. This is not a complete list of side effects and others may occur. Call your doctor for medical advice about side effects. You may report side effects to FDA at 5-465-FDA-4022. What other drugs will affect ciprofloxacin? Some medicines can make ciprofloxacin much less effective when taken at the same time. If you take any of the following medicines, take your ciprofloxacin dose 2 hours before or 6 hours after you take the other medicine.   · the ulcer medicine sucralfate, or antacids that contain calcium, magnesium, or aluminum (such as Maalox, Milk of Magnesia, Mylanta, Pepcid Complete, Rolaids, Tums, and others);  · didanosine (Videx) powder or chewable tablets;  · lanthanum carbonate or sevelamer; or  · vitamin or mineral supplements that contain calcium, iron, magnesium, or zinc.  Tell your doctor about all your other medicines, especially:  · cyclosporine, methotrexate, metoclopramide, phenytoin, probenecid, ropinirole, sildenafil, or theophylline;  · a blood thinner (warfarin, Coumadin, Jantoven);  · a diuretic or \"water pill\";  · heart rhythm medication;  · insulin or oral diabetes medicine (check your blood sugar regularly);  · medicine to treat depression or mental illness;  · steroid medicine (such as prednisone); or  · NSAIDs (nonsteroidal anti-inflammatory drugs) --ibuprofen (Advil, Motrin), naproxen (Aleve), celecoxib, diclofenac, indomethacin, meloxicam, and others. This list is not complete. Other drugs may affect ciprofloxacin, including prescription and over-the-counter medicines, vitamins, and herbal products. Not all possible drug interactions are listed here. Where can I get more information? Your pharmacist can provide more information about ciprofloxacin. Remember, keep this and all other medicines out of the reach of children, never share your medicines with others, and use this medication only for the indication prescribed. Every effort has been made to ensure that the information provided by Freda Balderrama Dr is accurate, up-to-date, and complete, but no guarantee is made to that effect. Drug information contained herein may be time sensitive. [facility] information has been compiled for use by healthcare practitioners and consumers in the [place] and therefore [facility] does not warrant that uses outside of the [place] are appropriate, unless specifically indicated otherwise. [facility]'s drug information does not endorse drugs, diagnose patients or recommend therapy. [facility] drug information is an informational resource designed to assist licensed healthcare practitioners in caring for their patients and/or to serve consumers viewing this service as a supplement to, and not a substitute for, the expertise, skill, knowledge and judgment of healthcare practitioners. The absence of a warning for a given drug or drug combination in no way should be construed to indicate that the drug or drug combination is safe, effective or appropriate for any given patient. [facility] does not assume any responsibility for any aspect of healthcare administered with the aid of information [facility] provides. The information contained herein is not intended to cover all possible uses, directions, precautions, warnings, drug interactions, allergic reactions, or adverse effects.  If you have questions about the drugs you are taking, check with your doctor, nurse or pharmacist.  Copyright 2209-7200 [address]: 22.02. Revision date: 2/24/2020. Care instructions adapted under license by [facility] ([facility]). If you have questions about a medical condition or this instruction, always ask your healthcare professional. [place] [address] any warranty or liability for your use of this information.

## 2021-02-17 NOTE — PROGRESS NOTES
Acute Office Visit  2/17/2021    SUBJECTIVE:    Patient ID: Fernando Kulkarni is a 61 y.o. female. Chief Complaint   Patient presents with    Diverticulitis     diverticulitis getting worse. was on clear liquid yesterday       HPI: The patient presents to the office for an acute visit. Pt reports abdominal cramping/pains with nausea. She states that she has a history of diverticulitis and has not had a flare recently, but states this is how her last flare felt. Drank a clear liquid diet yesterday and symptoms seem improved. Denies urinary complaints. Drinking water to stay hydrated. Last BM was today- this was normal. History of hysterectomy. Had colonoscopy 11/25/2020 with recommend repeat in 7 years.     Allergies   Allergen Reactions    Latex Rash    Statins Nausea Only     Current Outpatient Medications   Medication Sig Dispense Refill    zinc gluconate 50 MG tablet Take 50 mg by mouth daily      magnesium 200 MG TABS tablet Take 200 mg by mouth daily      metroNIDAZOLE (FLAGYL) 500 MG tablet Take 1 tablet by mouth 3 times daily for 10 days 30 tablet 0    ciprofloxacin (CIPRO) 500 MG tablet Take 1 tablet by mouth 2 times daily for 10 days 20 tablet 0    rivaroxaban (XARELTO) 20 MG TABS tablet Take 1 tablet by mouth Daily with supper 90 tablet 3    metoprolol succinate (TOPROL XL) 25 MG extended release tablet Take 0.5 tablets by mouth daily 180 tablet 0    montelukast (SINGULAIR) 10 MG tablet Take 1 tablet by mouth nightly 90 tablet 1    DULoxetine (CYMBALTA) 60 MG extended release capsule Take 1 capsule by mouth daily 90 capsule 1    levothyroxine (SYNTHROID) 112 MCG tablet Take 1 tablet by mouth daily 90 tablet 1    cetirizine (ZYRTEC) 10 MG tablet Take 1 tablet by mouth daily 90 tablet 1    colestipol (COLESTID) 1 g tablet Take 1 tablet by mouth 2 times daily 180 tablet 1    vitamin D (ERGOCALCIFEROL) 1.25 MG (08847 UT) CAPS capsule Take 1 capsule by mouth every 14 days 6 capsule 1    acetaminophen (TYLENOL 8 HOUR) 650 MG extended release tablet Take 650 mg by mouth every 8 hours as needed for Pain       No current facility-administered medications for this visit. Review of Systems   Constitutional: Negative for appetite change, chills, fatigue and fever. Respiratory: Negative for cough and shortness of breath. Cardiovascular: Negative for chest pain. Gastrointestinal: Positive for abdominal pain and nausea. Negative for blood in stool, constipation, diarrhea and vomiting. Genitourinary: Negative for decreased urine volume, difficulty urinating, dysuria, flank pain, frequency, genital sores, hematuria and urgency. Skin: Negative for pallor. OBJECTIVE:  /86   Pulse 77   Temp 96.4 °F (35.8 °C) (Temporal)   Wt 269 lb (122 kg)   SpO2 97%   BMI 47.65 kg/m²    Physical Exam  Vitals signs reviewed. Constitutional:       General: She is not in acute distress. Appearance: She is well-developed. She is not diaphoretic. HENT:      Head: Normocephalic. Mouth/Throat:      Mouth: Mucous membranes are moist.   Cardiovascular:      Rate and Rhythm: Normal rate and regular rhythm. Pulmonary:      Effort: Pulmonary effort is normal. No respiratory distress. Breath sounds: Normal breath sounds. No wheezing. Abdominal:      General: Bowel sounds are normal. There is no distension. Palpations: Abdomen is soft. There is no mass. Tenderness: There is abdominal tenderness. There is no guarding or rebound. Genitourinary:     Comments: No CVA tenderness noted  Skin:     General: Skin is warm and dry. Coloration: Skin is not pale. Findings: No erythema or rash. Neurological:      Mental Status: She is alert and oriented to person, place, and time. ASSESSMENT/PLAN:  Joon Martinez was seen today for diverticulitis. Diagnoses and all orders for this visit:    Generalized abdominal pain  -     Recommended CT scan for evaluation.   Patient reports she has a history of diverticulitis and this is how it felt in the past.  She is not interested in being evaluated with a CT scan due to the COVID-19 virus. She states she would like treated for diverticulitis at this time and she will call if symptoms worsen or fail to improve  - Patient education handout on diverticulitis provided and reviewed with the patient. Diet extensively reviewed with the patient. All questions were answered. - metroNIDAZOLE (FLAGYL) 500 MG tablet; Take 1 tablet by mouth 3 times daily for 10 days - patient education handout provided and reviewed with the pt.  -     ciprofloxacin (CIPRO) 500 MG tablet; Take 1 tablet by mouth 2 times daily for 10 days - patient education handout provided and reviewed with the pt. - Patient will call if symptoms worsen or fail to improve  - Red flag warning signs reviewed with the patient and she will go to the ER if these occur    Return for as previously scheduled or sooner if needed. Pt informed to call if symptoms worsen or fail to improve. All questions answered. Patient states no further questions or concerns at this time.     Electronically signed by JOSE Austin CNP 02/17/21

## 2021-03-29 ENCOUNTER — TELEPHONE (OUTPATIENT)
Dept: CARDIOLOGY CLINIC | Age: 61
End: 2021-03-29

## 2021-03-29 DIAGNOSIS — I48.0 PAF (PAROXYSMAL ATRIAL FIBRILLATION) (HCC): ICD-10-CM

## 2021-03-29 DIAGNOSIS — R00.0 TACHYCARDIA: Primary | ICD-10-CM

## 2021-03-29 NOTE — TELEPHONE ENCOUNTER
Increase Toprol XL to 25 mg daily to see if HR improves. (May take an extra 12.5 mg now). If her symptoms worsen, particularly her chest pressure and SOB, she may need to go to ER for further evaluation.     Daquan Salmeron, APRN-CNP

## 2021-03-29 NOTE — TELEPHONE ENCOUNTER
I spoke to Nicolasa. Her HR via oximeter has been in the 130's for a couple of hours. She has tried to deep breathing to calm herself down to no avail. She has chest tightness, a headache, and has cardiac awareness where she can feel her heart beating \"all over. \" This makes her anxious. Her B/P 30 minutes ago was 141/80, . HR is now 141, 98% sat. I advised her to perform valsalva a couple of times which she did. Now her HR is 98, B/P 126/74. Her tightness is almost gone, still has HA. She feels less anxious. She feels better overall. She could not determine if she is in AF. She had AF ablation 10/2/2020 and saw Dr. Asuncion Celestin 2/4/2021. EKG was NSR. She takes Toprol 12.5mg daily. No upcoming appt's. This is the first time this has happened since her ablation. She did have some cardiac awareness 2 days ago but today was the first time her HR was elevated. Any recommendations?

## 2021-03-29 NOTE — TELEPHONE ENCOUNTER
I went over all instructions with Alex Sykes. She has been taking the metoprolol 25mg qod as she had trouble breaking it; she will take a whole tab qd starting tomorrow. She will take a tab tonight if symptoms return; she knows she can take extra tab daily prn if SBP >100. She understands to go to the ER if symptoms do not chriss with meds and valsalva. She will come in tomorrow for [facility] placement only (order placed); she will schedule f/u akilah't with Rupal at that time for end of May.

## 2021-03-30 ENCOUNTER — NURSE ONLY (OUTPATIENT)
Dept: CARDIOLOGY CLINIC | Age: 61
End: 2021-03-30
Payer: MEDICARE

## 2021-03-30 DIAGNOSIS — R00.0 TACHYCARDIA: ICD-10-CM

## 2021-03-30 DIAGNOSIS — I48.0 PAF (PAROXYSMAL ATRIAL FIBRILLATION) (HCC): Primary | ICD-10-CM

## 2021-03-30 PROCEDURE — 93228 REMOTE 30 DAY ECG REV/REPORT: CPT | Performed by: INTERNAL MEDICINE

## 2021-03-30 NOTE — PROGRESS NOTES
[facility] requested for pt. Device was registered and placed. Tutorial given, pt verbalized understanding.

## 2021-04-05 DIAGNOSIS — E03.8 OTHER SPECIFIED HYPOTHYROIDISM: ICD-10-CM

## 2021-04-05 DIAGNOSIS — J30.89 SEASONAL ALLERGIC RHINITIS DUE TO OTHER ALLERGIC TRIGGER: ICD-10-CM

## 2021-04-05 DIAGNOSIS — E78.49 OTHER HYPERLIPIDEMIA: ICD-10-CM

## 2021-04-05 DIAGNOSIS — M79.7 FIBROMYALGIA: ICD-10-CM

## 2021-04-05 NOTE — TELEPHONE ENCOUNTER
Patient requests refills for:    colestipol (COLESTID) 1 g tablet- out of medication. Please send short supply to [facility] on [address]     cyclobenzaprine (FLEXERIL) 10 MG tablet     cetirizine (ZYRTEC) 10 MG tablet     DULoxetine (CYMBALTA) 60 MG extended release capsule     levothyroxine (SYNTHROID) 112 MCG tablet     Please send to [facility]

## 2021-04-06 DIAGNOSIS — I10 ESSENTIAL HYPERTENSION: ICD-10-CM

## 2021-04-06 RX ORDER — MONTELUKAST SODIUM 4 MG/1
1 TABLET, CHEWABLE ORAL 2 TIMES DAILY
Qty: 180 TABLET | Refills: 0 | Status: SHIPPED | OUTPATIENT
Start: 2021-04-06 | End: 2021-05-05 | Stop reason: SDUPTHER

## 2021-04-06 RX ORDER — DULOXETIN HYDROCHLORIDE 60 MG/1
60 CAPSULE, DELAYED RELEASE ORAL DAILY
Qty: 90 CAPSULE | Refills: 0 | Status: SHIPPED | OUTPATIENT
Start: 2021-04-06 | End: 2021-05-05 | Stop reason: SDUPTHER

## 2021-04-06 RX ORDER — CETIRIZINE HYDROCHLORIDE 10 MG/1
10 TABLET ORAL DAILY
Qty: 90 TABLET | Refills: 0 | Status: SHIPPED | OUTPATIENT
Start: 2021-04-06

## 2021-04-06 RX ORDER — METOPROLOL SUCCINATE 25 MG/1
25 TABLET, EXTENDED RELEASE ORAL DAILY
Qty: 90 TABLET | Refills: 1 | Status: SHIPPED | OUTPATIENT
Start: 2021-04-06 | End: 2021-12-10

## 2021-04-06 RX ORDER — LEVOTHYROXINE SODIUM 112 UG/1
112 TABLET ORAL DAILY
Qty: 90 TABLET | Refills: 0 | Status: SHIPPED | OUTPATIENT
Start: 2021-04-06 | End: 2021-05-05 | Stop reason: SDUPTHER

## 2021-04-13 ENCOUNTER — TELEPHONE (OUTPATIENT)
Dept: INTERNAL MEDICINE CLINIC | Age: 61
End: 2021-04-13

## 2021-04-14 ENCOUNTER — VIRTUAL VISIT (OUTPATIENT)
Dept: INTERNAL MEDICINE CLINIC | Age: 61
End: 2021-04-14
Payer: MEDICARE

## 2021-04-14 DIAGNOSIS — K59.09 OTHER CONSTIPATION: ICD-10-CM

## 2021-04-14 DIAGNOSIS — R11.0 NAUSEA: Primary | ICD-10-CM

## 2021-04-14 PROCEDURE — 99442 PR PHYS/QHP TELEPHONE EVALUATION 11-20 MIN: CPT | Performed by: NURSE PRACTITIONER

## 2021-04-14 RX ORDER — ONDANSETRON 4 MG/1
4 TABLET, FILM COATED ORAL DAILY PRN
Qty: 30 TABLET | Refills: 0 | Status: SHIPPED | OUTPATIENT
Start: 2021-04-14 | End: 2021-05-05 | Stop reason: ALTCHOICE

## 2021-04-14 RX ORDER — SENNA PLUS 8.6 MG/1
1 TABLET ORAL DAILY PRN
Qty: 30 TABLET | Refills: 0 | Status: SHIPPED | OUTPATIENT
Start: 2021-04-14 | End: 2021-05-05 | Stop reason: ALTCHOICE

## 2021-04-14 NOTE — TELEPHONE ENCOUNTER
Spoke with patient. She is not interested in the cyclobenzaprine at this time. Will wait until her follow up. However, patient complains of nausea. Scheduled VV for this afternoon for evaluation.  Patient can only do telephone call, as she has multiple difficulties with doing the video calls in the past.

## 2021-04-14 NOTE — PROGRESS NOTES
use senna as needed. Patient is agreeable to the plan  - senna (SENOKOT) 8.6 MG tablet; Take 1 tablet by mouth daily as needed for Constipation - patient education handout provided and reviewed with the pt. - Pt will call if symptoms worsen or fail to improve. I affirm this is a Patient Initiated Episode with a Patient who has not had a related appointment within my department in the past 7 days or scheduled within the next 24 hours. Patient identification was verified at the start of the visit: Yes    Total Time: minutes: 11-20 minutes    The visit was conducted pursuant to the emergency declaration under the [address] [place] [address], [address] [place] [address] authority and the [employer] and [email or web address] General Act. Patient identification was verified, and a caregiver was present when appropriate. The patient was located in a state where the provider was credentialed to provide care. Note: not billable if this call serves to triage the patient into an appointment for the relevant concern    All questions answered. Patient states no further questions or concerns at this time. Return for as previously scheduled or sooner if needed.   Justin Tello

## 2021-04-14 NOTE — PATIENT INSTRUCTIONS
Patient Education        ondansetron (oral)  Pronunciation:  on DAN se david  Brand:  Zofran, Zofran Jon BRICEÑO  What is the most important information I should know about ondansetron? You should not use ondansetron if you are also using apomorphine (Apokyn). What is ondansetron? Ondansetron blocks the actions of chemicals in the body that can trigger nausea and vomiting. Ondansetron is used to prevent nausea and vomiting that may be caused by surgery, cancer chemotherapy, or radiation treatment. Ondansetron may be used for purposes not listed in this medication guide. What should I discuss with my health care provider before taking ondansetron? You should not use ondansetron if:  · you are also using apomorphine (Apokyn); or  · you are allergic to ondansetron or similar medicines (dolasetron, granisetron, palonosetron). To make sure ondansetron is safe for you, tell your doctor if you have:  · liver disease;  · an electrolyte imbalance (such as low levels of potassium or magnesium in your blood);  · congestive heart failure, slow heartbeats;  · a personal or family history of long QT syndrome; or  · a blockage in your digestive tract (stomach or intestines). Ondansetron is not expected to harm an unborn baby. Tell your doctor if you are pregnant. It is not known whether ondansetron passes into breast milk or if it could harm a nursing baby. Tell your doctor if you are breast-feeding a baby. Ondansetron is not approved for use by anyone younger than 3years old. Ondansetron orally disintegrating tablets may contain phenylalanine. Tell your doctor if you have phenylketonuria (PKU). How should I take ondansetron? Follow all directions on your prescription label. Do not take this medicine in larger or smaller amounts or for longer than recommended. Ondansetron can be taken with or without food.   The first dose of ondansetron is usually taken before the start of your surgery, chemotherapy, or radiation treatment. Follow your doctor's dosing instructions very carefully. Take the ondansetron regular tablet  with a full glass of water. To take the orally disintegrating tablet (Zofran ODT):  · Keep the tablet in its blister pack until you are ready to take it. Open the package and peel back the foil. Do not push a tablet through the foil or you may damage the tablet. · Use dry hands to remove the tablet and place it in your mouth. · Do not swallow the tablet whole. Allow it to dissolve in your mouth without chewing. · Swallow several times as the tablet dissolves. To use ondansetron oral soluble film (strip) ([place]):  · Keep the strip in the foil pouch until you are ready to use the medicine. · Using dry hands, remove the strip and place it on your tongue. It will begin to dissolve right away. · Do not swallow the strip whole. Allow it to dissolve in your mouth without chewing. · Swallow several times after the strip dissolves. If desired, you may drink liquid to help swallow the dissolved strip. · Wash your hands after using [place]. Measure liquid medicine with the dosing syringe provided, or with a special dose-measuring spoon or medicine cup. If you do not have a dose-measuring device, ask your pharmacist for one. Store at room temperature away from moisture, heat, and light. Store liquid medicine in an upright position. What happens if I miss a dose? Take the missed dose as soon as you remember. Skip the missed dose if it is almost time for your next scheduled dose. Do not  take extra medicine to make up the missed dose. What happens if I overdose? Seek emergency medical attention or call the Poison Help line at 1-122.203.2047. Overdose symptoms may include sudden loss of vision, severe constipation, feeling light-headed, or fainting. What should I avoid while taking ondansetron? Ondansetron may impair your thinking or reactions.  Be careful if you drive or do anything that requires you to be alert.  What are the possible side effects of ondansetron? Get emergency medical help if you have signs of an allergic reaction: rash, hives; fever, chills, difficult breathing; swelling of your face, lips, tongue, or throat. Call your doctor at once if you have:  · severe constipation, stomach pain, or bloating;  · headache with chest pain and severe dizziness, fainting, fast or pounding heartbeats;  · fast or pounding heartbeats;  · jaundice (yellowing of the skin or eyes);  · blurred vision or temporary vision loss (lasting from only a few minutes to several hours);  · high levels of serotonin in the body --agitation, hallucinations, fever, fast heart rate, overactive reflexes, nausea, vomiting, diarrhea, loss of coordination, fainting. Common side effects may include:  · diarrhea or constipation;  · headache;  · drowsiness; or  · tired feeling. This is not a complete list of side effects and others may occur. Call your doctor for medical advice about side effects. You may report side effects to FDA at 5-046-FDA-3393. What other drugs will affect ondansetron? Ondansetron can cause a serious heart problem, especially if you use certain medicines at the same time, including antibiotics, antidepressants, heart rhythm medicine, antipsychotic medicines, and medicines to treat cancer, malaria, HIV or AIDS. Tell your doctor about all medicines you use, and those you start or stop using during your treatment with ondansetron. Taking ondansetron while you are using certain other medicines can cause high levels of serotonin to build up in your body, a condition called \"serotonin syndrome,\" which can be fatal. Tell your doctor if you also use:  · medicine to treat depression;  · medicine to treat a psychiatric disorder;  · a narcotic (opioid) medication; or  · medicine to prevent nausea and vomiting. This list is not complete and many other drugs can interact with ondansetron.  This includes prescription and over-the-counter medicines, vitamins, and herbal products. Give a list of all your medicines to any healthcare provider who treats you. Where can I get more information? Your pharmacist can provide more information about ondansetron. Remember, keep this and all other medicines out of the reach of children, never share your medicines with others, and use this medication only for the indication prescribed. Every effort has been made to ensure that the information provided by [address] [place] [address] is accurate, up-to-date, and complete, but no guarantee is made to that effect. Drug information contained herein may be time sensitive. [facility] information has been compiled for use by healthcare practitioners and consumers in the [facility] and therefore [facility] does not warrant that uses outside of the [facility] are appropriate, unless specifically indicated otherwise. [facility]'s drug information does not endorse drugs, diagnose patients or recommend therapy. [facility] drug information is an informational resource designed to assist licensed healthcare practitioners in caring for their patients and/or to serve consumers viewing this service as a supplement to, and not a substitute for, the expertise, skill, knowledge and judgment of healthcare practitioners. The absence of a warning for a given drug or drug combination in no way should be construed to indicate that the drug or drug combination is safe, effective or appropriate for any given patient. [facility] does not assume any responsibility for any aspect of healthcare administered with the aid of information [facility] provides. The information contained herein is not intended to cover all possible uses, directions, precautions, warnings, drug interactions, allergic reactions, or adverse effects. If you have questions about the drugs you are taking, check with your doctor, nurse or pharmacist.  Copyright 6301-9805 Mitzi [address] [place] [address] Avenue: 13.01.  Revision date: 10/21/2016. Care instructions adapted under license by [facility] ([facility]). If you have questions about a medical condition or this instruction, always ask your healthcare professional. [address] any warranty or liability for your use of this information. Patient Education        senna  Pronunciation:  SEN nah  Brand:  Akil Fields, Dr Stacia Corbett Laxative, Ex-Lax Chocolated, Ex-Lax Maximum Relief Formula, Ex-Lax Regular Strength Pills, Fletchers Castoria, Innerclean, [place] Laxative Drops, Pedia-Lax, Perdiem Overnight, Senexon, Senna, Senna Lax, Senna Smooth, Senna-gen, Senokot, Senokot Extra, Senokot To Go, SenokotXTRA, SenoSol, SenoSol-X  What is the most important information I should know about senna? Not all uses for senna have been approved by the FDA. Senna should not be used in place of medication prescribed for you by your doctor. Senna is often sold as an herbal supplement. There are no regulated manufacturing standards in place for many herbal compounds and some marketed supplements have been found to be contaminated with toxic metals or other drugs. Herbal/health supplements should be purchased from a reliable source to minimize the risk of contamination. Use senna as directed on the label, or as your healthcare provider has prescribed. Do not use this product in larger amounts or for longer than recommended. Call your healthcare provider if your symptoms do not improve, or if they get worse while using senna. Do not use this product for longer than 1 week without the advice of a healthcare provider. What is senna? Senna is also known as [place] senna, tinnevelly senna, [place] ([facility]) senna, Alexandrian senna, and [place] senna. Senna has been used in alternative medicine as an aid to treat constipation. Not all uses for senna have been approved by the FDA. Senna should not be used in place of medication prescribed for you by your doctor.   Senna is often sold as an herbal supplement. There are no regulated manufacturing standards in place for many herbal compounds and some marketed supplements have been found to be contaminated with toxic metals or other drugs. Herbal/health supplements should be purchased from a reliable source to minimize the risk of contamination. Senna may also be used for purposes not listed in this product guide. What should I discuss with my health care provider before taking senna? Ask a doctor, pharmacist, herbalist, or other healthcare provider if it is safe for you to use this product if you have:  · a bowel disorder such as Crohn's disease or ulcerative colitis;  · heart disease; or  · stomach pain, nausea, or vomiting. It is not known whether senna will harm an unborn baby. Do not use this product without medical advice if you are pregnant. It is not known whether senna passes into breast milk or if it could harm a nursing baby. Do not use this product without medical advice if you are breast-feeding a baby. Some forms of senna are made for use by children. Do not give any herbal/health supplement to a child without the advice of a doctor. How should I take senna? When considering the use of herbal supplements, seek the advice of your doctor. You may also consider consulting a practitioner who is trained in the use of herbal/health supplements. If you choose to use senna, use it as directed on the package or as directed by your doctor, pharmacist, or other healthcare provider. Do not use more of this product than is recommended on the label. Senna is usually taken before bed to produce a bowel movement 6 to 12 hours later when you wake up. Measure liquid medicine with a special dose-measuring spoon or medicine cup, not with a regular table spoon. If you do not have a dose-measuring device, ask your pharmacist for one. Do not use different formulations of senna (such as tablets and liquid) at the same time without medical advice.  Using different formulations together increases the risk of an overdose of senna. Call your healthcare provider if your symptoms do not improve, or if they get worse while using senna. Do not use this product for longer than 1 week without the advice of a healthcare provider. Store at room temperature away from moisture, heat, and light. What happens if I miss a dose? Skip the missed dose if it is almost time for your next scheduled dose. Do not  use extra medicine to make up the missed dose. What happens if I overdose? Seek emergency medical attention or call the Poison Help line at 1-758.761.6254. What should I avoid while taking senna? Follow your healthcare provider's instructions about any restrictions on food, beverages, or activity. What are the possible side effects of senna? Get emergency medical help if you have any of these signs of an allergic reaction:  hives; difficulty breathing; swelling of your face, lips, tongue, or throat. Call your healthcare provider at once if you have a serious side effect such as:  · severe stomach pain, severe diarrhea, watery diarrhea;  · weight loss;  · worsening constipation after you stop taking senna;  · enlargement of your fingers and toes;  · low potassium (confusion, uneven heart rate, extreme thirst, increased urination, leg discomfort, muscle weakness or limp feeling); or  · nausea, upper stomach pain, itching, loss of appetite, dark urine, jose-colored stools, jaundice (yellowing of the skin or eyes). Less serious side effects may include:  · stomach cramps, bloating, gas, mild diarrhea;  · numbness or tingly feeling;  · joint pain; or  · discolored urine. This is not a complete list of side effects and others may occur. Tell your doctor, pharmacist, herbalist, or other healthcare provider about any unusual or bothersome side effect. You may report side effects to FDA at 8-603-CEX-2139. What other drugs will affect senna?   Do not take senna without the advice of a healthcare provider if you are using any of the following medications:  · digoxin (Lanoxin);  · a diuretic (water pill); or  · a blood thinner such as warfarin (Coumadin, Ben Mitra). This list is not complete and other drugs may interact with senna. Tell your healthcare provider about all medications you use. This includes prescription, over-the-counter, vitamin, and herbal products. Do not start a new medication without telling your doctor. Where can I get more information? Consult with a licensed healthcare professional before using any herbal/health supplement. Whether you are treated by a medical doctor or a practitioner trained in the use of natural medicines/supplements, make sure all your healthcare providers know about all of your medical conditions and treatments. Remember, keep this and all other medicines out of the reach of children, never share your medicines with others, and use this medication only for the indication prescribed. Every effort has been made to ensure that the information provided by [address]a Balderr[address] Dr is accurate, up-to-date, and complete, but no guarantee is made to that effect. Drug information contained herein may be time sensitive. [facility] information has been compiled for use by healthcare practitioners and consumers in the [place] and therefore [employer] does not warrant that uses outside of the [place] are appropriate, unless specifically indicated otherwise. [facility]s drug information does not endorse drugs, diagnose patients or recommend therapy. [facility]s drug information is an informational resource designed to assist licensed healthcare practitioners in caring for their patients and/or to serve consumers viewing this service as a supplement to, and not a substitute for, the expertise, skill, knowledge and judgment of healthcare practitioners.  The absence of a warning for a given drug or drug combination in no way should be construed to indicate that the drug or drug combination is safe, effective or appropriate for any given patient. [facility] does not assume any responsibility for any aspect of healthcare administered with the aid of information [facility] provides. The information contained herein is not intended to cover all possible uses, directions, precautions, warnings, drug interactions, allergic reactions, or adverse effects. If you have questions about the drugs you are taking, check with your doctor, nurse or pharmacist.  Copyright 1362-7606 [address]. Version: 1.08. Revision date: 6/12/2012. Care instructions adapted under license by [facility] ([facility]). If you have questions about a medical condition or this instruction, always ask your healthcare professional. [place] [address] any warranty or liability for your use of this information.

## 2021-04-15 ENCOUNTER — TELEPHONE (OUTPATIENT)
Dept: CARDIOLOGY CLINIC | Age: 61
End: 2021-04-15

## 2021-04-15 NOTE — TELEPHONE ENCOUNTER
Pt called stating the her monitor is not gong to last the 30 days sensors are not staying charged, when she got it it was only at 52%  She is running out of the sticky. Should she just send in what she has? I It will last until this Sunday.     pls advise thank you

## 2021-04-15 NOTE — TELEPHONE ENCOUNTER
Spoke to the pt-asked if she had called [facility], she has not. Advised here to do so, and to get the name she is speaking to. Told her to call back if there are any problems.

## 2021-04-28 NOTE — PROGRESS NOTES
Annual Exam Office Visit   5/5/2021    Subjective:  Chief Complaint   Patient presents with    Annual Exam    6 Month Follow-Up     HPI:  Ebony Brandon is a 61 y.o. female who presents to the clinic today for an annual exam.     HTN/afib- seeing cardiology. Due to her chadsvasc score, she was started on Xarelto.  An echo and holter monitor were completed. Had an ablation. BP at home has been running 121/80. Asymptomatic. Denies chest pain, palpitations, shortness of breath, trouble breathing, lightheadedness, dizziness or blurred vision. Hyperlipidemia Takes colestipol 1 gram BID. States she tried 3-4 statins and \"they made me crazy. \" Denies interest in stains. Brought in cholesterol panel from 5/4/2021 at outside facility- triglycerides are high.      Mood Pt takes cymbalta 60 mg daily. Denies side effects. She states her mood is doing \"this week is a bad week\"- states she lost her mom 1 year ago and lost her sister 2 weeks ago. Has a good support system. Denies SI/HI. Not seeing a psychologist.       Prediabetes- not exercising. Diet is reported as \"eh\" but overall healthy per pt.     Sleep apnea- Uses CPAP with relief.     Allergic rhinitis Taking singulair and zyrtec with relief. Denies side effects.     Asthma- uses symbicort daily (in spring and fall) and albuterol PRN. Has not needed either inhaler since last visit. Has the inhalers if she needs it. Denies trouble breathing/SOB/wheezing.     Fibromyalgia- Prescribed Cymbalta and gabapentin 300 mg TID and vit D3. Tried lyrica with side effects. Chronic joint pains. Follows with rheumatology. Seeing [facility] for [facility] pains- performing PT.      Hypothyroidism- Taking synthroid 112 mcg for years. Asymptomatic. No side effects.     Brought COVID-19 vaccine card. Review of Systems   Constitutional: Negative for chills, fatigue and fever. HENT: Negative for congestion, postnasal drip, sinus pain and sore throat.     Respiratory: Negative for cough, shortness of breath and wheezing. Cardiovascular: Negative for chest pain, palpitations and leg swelling. Gastrointestinal: Negative for abdominal pain, blood in stool, constipation, diarrhea, nausea and vomiting. Genitourinary: Negative for dysuria, frequency, hematuria and urgency. Skin: Negative for pallor and rash. Neurological: Negative for dizziness, weakness, light-headedness, numbness and headaches. Psychiatric/Behavioral: Negative for dysphoric mood, sleep disturbance and suicidal ideas. The patient is not nervous/anxious.          Stressors     Allergies   Allergen Reactions    Latex Rash    Prednisone      Other reaction(s): Per patient-blow up    Statins Nausea Only     Family History   Problem Relation Age of Onset    Cancer Other     Cancer Other     Cancer Maternal Uncle         melanoma    Arthritis Mother     Diabetes Mother     Heart Disease Mother         a-fib    COPD Mother     Cancer Mother         skin    Ovarian Cancer Mother     Stroke Mother     COPD Father     Arthritis Father     Cancer Father         lung, prostate    COPD Brother     Stroke Brother     Glaucoma Maternal Grandmother     Breast Cancer Maternal Grandmother         breast    Heart Attack Neg Hx      Current Outpatient Rx   Medication Sig Dispense Refill    montelukast (SINGULAIR) 10 MG tablet Take 1 tablet by mouth nightly 90 tablet 1    colestipol (COLESTID) 1 g tablet Take 1 tablet by mouth 2 times daily 180 tablet 1    levothyroxine (SYNTHROID) 112 MCG tablet Take 1 tablet by mouth daily 90 tablet 1    DULoxetine (CYMBALTA) 60 MG extended release capsule Take 1 capsule by mouth daily 90 capsule 1    rivaroxaban (XARELTO) 20 MG TABS tablet Take 1 tablet by mouth Daily with supper 90 tablet 3    cetirizine (ZYRTEC) 10 MG tablet Take 1 tablet by mouth daily 90 tablet 0    metoprolol succinate (TOPROL XL) 25 MG extended release tablet Take 1 tablet by mouth daily 90 tablet 1    zinc gluconate 50 MG tablet Take 50 mg by mouth daily      magnesium 200 MG TABS tablet Take 200 mg by mouth daily      vitamin D (ERGOCALCIFEROL) 1.25 MG (31368 UT) CAPS capsule Take 1 capsule by mouth every 14 days 6 capsule 1    acetaminophen (TYLENOL 8 HOUR) 650 MG extended release tablet Take 650 mg by mouth every 8 hours as needed for Pain       Social History     Socioeconomic History    Marital status:      Spouse name: Not on file    Number of children: Not on file    Years of education: Not on file    Highest education level: Not on file   Occupational History    Not on file   Social Needs    Financial resource strain: Not hard at all   [employer] insecurity     Worry: Never true     Inability: Never true   [employer] needs     Medical: No     Non-medical: No   Tobacco Use    Smoking status: Never Smoker    Smokeless tobacco: Never Used   Substance and Sexual Activity    Alcohol use: Yes     Comment: 2 drinks per month    Drug use: No    Sexual activity: Not on file   Lifestyle    Physical activity     Days per week: Not on file     Minutes per session: Not on file    Stress: Not on file   Relationships    Social connections     Talks on phone: Not on file     Gets together: Not on file     Attends Zoroastrianism service: Not on file     Active member of club or organization: Not on file     Attends meetings of clubs or organizations: Not on file     Relationship status: Not on file    Intimate partner violence     Fear of current or ex partner: Not on file     Emotionally abused: Not on file     Physically abused: Not on file     Forced sexual activity: Not on file   Other Topics Concern    Not on file   Social History Narrative    Not on file     Past Medical History:   Diagnosis Date    [facility]-fib [facility])     Anxiety     Asthma     Back pain     Depression     Fibromyalgia     Hyperlipidemia     Hypertension     Migraines     Other specified hypothyroidism 5/5/2021    Prediabetes  Unspecified sleep apnea     Urinary incontinence      Patient Active Problem List   Diagnosis    Hypertension    Hyperlipidemia    Back pain    Sleep apnea    Urinary incontinence    Class 3 severe obesity due to excess calories without serious comorbidity with body mass index (BMI) of 45.0 to 49.9 in adult [facility])    Peripheral polyneuropathy    Neurogenic claudication due to lumbar spinal stenosis    Osteoarthritis of spine with radiculopathy, lumbar region    Balance problem    Skin lesion of left leg    Intractable chronic migraine without aura and with status migrainosus    Prediabetes    Depression    Asthma    Fibromyalgia    PAF (paroxysmal atrial fibrillation) ([facility] [address]    Other specified hypothyroidism      Wt Readings from Last 3 Encounters:   05/05/21 269 lb (122 kg)   02/17/21 269 lb (122 kg)   02/04/21 269 lb 9.6 oz (122.3 kg)     BP Readings from Last 3 Encounters:   05/05/21 128/78   02/17/21 124/86   02/04/21 112/80     The 10-year ASCVD risk score (Angelica Lopez, et al., 2013) is: 3.1%    Values used to calculate the score:      Age: 61 years      Sex: Female      Is Non- : No      Diabetic: No      Tobacco smoker: No      Systolic Blood Pressure: 037 mmHg      Is BP treated: No      HDL Cholesterol: 65 mg/dL      Total Cholesterol: 214 mg/dL    PHQ-9 Total Score: 7 (5/5/2021 11:33 AM)  Thoughts that you would be better off dead, or of hurting yourself in some way: 0 (5/5/2021 11:33 AM)    Objective/Physical Exam:  /78   Pulse 72   Wt 269 lb (122 kg)   SpO2 97%   BMI 47.65 kg/m²   Body mass index is 47.65 kg/m². Physical Exam  Vitals signs reviewed. Constitutional:       General: She is not in acute distress. Appearance: She is well-developed. She is not diaphoretic. HENT:      Head: Normocephalic and atraumatic.       Right Ear: Tympanic membrane and external ear normal.      Left Ear: Tympanic membrane and external ear normal.   Eyes: Pupils: Pupils are equal, round, and reactive to light. Neck:      Thyroid: No thyromegaly. Cardiovascular:      Rate and Rhythm: Normal rate and regular rhythm. Pulmonary:      Effort: Pulmonary effort is normal. No respiratory distress. Breath sounds: Normal breath sounds. No wheezing or rales. Chest:      Chest wall: No tenderness. Abdominal:      General: Bowel sounds are normal. There is no distension. Palpations: Abdomen is soft. Tenderness: There is no abdominal tenderness. There is no guarding. Musculoskeletal: Normal range of motion. General: No tenderness or deformity. Lymphadenopathy:      Cervical: No cervical adenopathy. Skin:     General: Skin is warm and dry. Coloration: Skin is not pale. Findings: No erythema or rash. Neurological:      Mental Status: She is alert and oriented to person, place, and time. Coordination: Coordination normal.   Psychiatric:         Mood and Affect: Mood normal.       Assessment and Plan:  Vanetta Sicard was seen today for annual exam and 6 month follow-up. Diagnoses and all orders for this visit:    Annual physical exam   - Labs today    Essential hypertension/PAF (paroxysmal atrial fibrillation) (HCC)/Other hyperlipidemia  -    Blood pressure stable today. Reported as to goal at home. Asymptomatic.  - Continue with cardiology. - Patient brought lipid panel that was completed through an outside lab. Triglycerides elevated. - Lifestyle modifications such as exercise, weight loss and healthy diet encouraged and reviewed with the pt. - colestipol (COLESTID) 1 g tablet; Take 1 tablet by mouth 2 times daily-refilled today    Stressful life event affecting family/Positive depression screening  -    Increased life stressors. She states that this is temporary and thinks the dose of medication should remain the same at this time. Denies side effects. Continue current regimen.  - Recommend psychology appt.   Patient is agreeable to plan. Has seen this provider before- does not need a referral.  - Denies SI/HI. - Positive Screen for Clinical Depression with a Documented Follow-up Plan   -     DULoxetine (CYMBALTA) 60 MG extended release capsule; Take 1 capsule by mouth daily    Prediabetes  -    Lifestyle modifications such as exercise, weight loss and healthy diet encouraged and reviewed with the pt. - COMPREHENSIVE METABOLIC PANEL; Future  -     HEMOGLOBIN A1C; Future    Other sleep apnea   - Continue with CPAP    Seasonal allergic rhinitis due to other allergic trigger  -    Well-controlled per patient. She would like to continue on this regimen. Denies side effects  - montelukast (SINGULAIR) 10 MG tablet; Take 1 tablet by mouth nightly    Uncomplicated asthma, unspecified asthma severity, unspecified whether persistent   - Stable. Patient reports she has not used inhalers. Denies symptoms    Fibromyalgia  -    Continue with rheumatology. Other specified hypothyroidism  -    Asymptomatic. Denies side effects. Will reevaluate TSH.   - Continue current regimen  - TSH without Reflex; Future  -     levothyroxine (SYNTHROID) 112 MCG tablet; Take 1 tablet by mouth daily- refilled today    Return in about 6 months (around 11/16/2021) for AWV and HTN/mood/allergies f/u, or sooner if needed. Pt will call if symptoms worsen or fail to improve. All questions answered. Pt states no further questions or concerns at this time.    Electronically signed by: Randy Whitley 05/05/21

## 2021-05-05 ENCOUNTER — OFFICE VISIT (OUTPATIENT)
Dept: INTERNAL MEDICINE CLINIC | Age: 61
End: 2021-05-05
Payer: MEDICARE

## 2021-05-05 VITALS
DIASTOLIC BLOOD PRESSURE: 78 MMHG | SYSTOLIC BLOOD PRESSURE: 128 MMHG | OXYGEN SATURATION: 97 % | HEART RATE: 72 BPM | WEIGHT: 269 LBS | BODY MASS INDEX: 47.65 KG/M2

## 2021-05-05 DIAGNOSIS — Z63.79 STRESSFUL LIFE EVENT AFFECTING FAMILY: ICD-10-CM

## 2021-05-05 DIAGNOSIS — J45.909 UNCOMPLICATED ASTHMA, UNSPECIFIED ASTHMA SEVERITY, UNSPECIFIED WHETHER PERSISTENT: ICD-10-CM

## 2021-05-05 DIAGNOSIS — I10 ESSENTIAL HYPERTENSION: ICD-10-CM

## 2021-05-05 DIAGNOSIS — I48.0 PAF (PAROXYSMAL ATRIAL FIBRILLATION) (HCC): ICD-10-CM

## 2021-05-05 DIAGNOSIS — E03.8 OTHER SPECIFIED HYPOTHYROIDISM: ICD-10-CM

## 2021-05-05 DIAGNOSIS — G47.39 OTHER SLEEP APNEA: ICD-10-CM

## 2021-05-05 DIAGNOSIS — Z13.31 POSITIVE DEPRESSION SCREENING: ICD-10-CM

## 2021-05-05 DIAGNOSIS — E78.49 OTHER HYPERLIPIDEMIA: ICD-10-CM

## 2021-05-05 DIAGNOSIS — M79.7 FIBROMYALGIA: ICD-10-CM

## 2021-05-05 DIAGNOSIS — Z00.00 ANNUAL PHYSICAL EXAM: Primary | ICD-10-CM

## 2021-05-05 DIAGNOSIS — R73.03 PREDIABETES: ICD-10-CM

## 2021-05-05 DIAGNOSIS — J30.89 SEASONAL ALLERGIC RHINITIS DUE TO OTHER ALLERGIC TRIGGER: ICD-10-CM

## 2021-05-05 LAB
A/G RATIO: 2 (ref 1.1–2.2)
ALBUMIN SERPL-MCNC: 4.6 G/DL (ref 3.4–5)
ALP BLD-CCNC: 99 U/L (ref 40–129)
ALT SERPL-CCNC: 23 U/L (ref 10–40)
ANION GAP SERPL CALCULATED.3IONS-SCNC: 13 MMOL/L (ref 3–16)
AST SERPL-CCNC: 17 U/L (ref 15–37)
BILIRUB SERPL-MCNC: 0.3 MG/DL (ref 0–1)
BUN BLDV-MCNC: 15 MG/DL (ref 7–20)
CALCIUM SERPL-MCNC: 10.1 MG/DL (ref 8.3–10.6)
CHLORIDE BLD-SCNC: 101 MMOL/L (ref 99–110)
CO2: 25 MMOL/L (ref 21–32)
CREAT SERPL-MCNC: 0.8 MG/DL (ref 0.6–1.2)
GFR AFRICAN AMERICAN: >60
GFR NON-AFRICAN AMERICAN: >60
GLOBULIN: 2.3 G/DL
GLUCOSE BLD-MCNC: 84 MG/DL (ref 70–99)
POTASSIUM SERPL-SCNC: 4.6 MMOL/L (ref 3.5–5.1)
SODIUM BLD-SCNC: 139 MMOL/L (ref 136–145)
TOTAL PROTEIN: 6.9 G/DL (ref 6.4–8.2)
TSH SERPL DL<=0.05 MIU/L-ACNC: 0.37 UIU/ML (ref 0.27–4.2)

## 2021-05-05 PROCEDURE — G8431 POS CLIN DEPRES SCRN F/U DOC: HCPCS | Performed by: NURSE PRACTITIONER

## 2021-05-05 PROCEDURE — G8427 DOCREV CUR MEDS BY ELIG CLIN: HCPCS | Performed by: NURSE PRACTITIONER

## 2021-05-05 PROCEDURE — 1036F TOBACCO NON-USER: CPT | Performed by: NURSE PRACTITIONER

## 2021-05-05 PROCEDURE — 99214 OFFICE O/P EST MOD 30 MIN: CPT | Performed by: NURSE PRACTITIONER

## 2021-05-05 PROCEDURE — 3017F COLORECTAL CA SCREEN DOC REV: CPT | Performed by: NURSE PRACTITIONER

## 2021-05-05 PROCEDURE — G8417 CALC BMI ABV UP PARAM F/U: HCPCS | Performed by: NURSE PRACTITIONER

## 2021-05-05 RX ORDER — MONTELUKAST SODIUM 10 MG/1
10 TABLET ORAL NIGHTLY
Qty: 90 TABLET | Refills: 1 | Status: SHIPPED | OUTPATIENT
Start: 2021-05-05 | End: 2021-10-05

## 2021-05-05 RX ORDER — DULOXETIN HYDROCHLORIDE 60 MG/1
60 CAPSULE, DELAYED RELEASE ORAL DAILY
Qty: 90 CAPSULE | Refills: 1 | Status: SHIPPED | OUTPATIENT
Start: 2021-05-05 | End: 2021-12-10 | Stop reason: SDUPTHER

## 2021-05-05 RX ORDER — MONTELUKAST SODIUM 4 MG/1
1 TABLET, CHEWABLE ORAL 2 TIMES DAILY
Qty: 180 TABLET | Refills: 1 | Status: SHIPPED | OUTPATIENT
Start: 2021-05-05 | End: 2021-12-10 | Stop reason: SDUPTHER

## 2021-05-05 RX ORDER — LEVOTHYROXINE SODIUM 112 UG/1
112 TABLET ORAL DAILY
Qty: 90 TABLET | Refills: 1 | Status: SHIPPED | OUTPATIENT
Start: 2021-05-05 | End: 2021-11-05

## 2021-05-05 ASSESSMENT — PATIENT HEALTH QUESTIONNAIRE - PHQ9
4. FEELING TIRED OR HAVING LITTLE ENERGY: 0
SUM OF ALL RESPONSES TO PHQ QUESTIONS 1-9: 7
3. TROUBLE FALLING OR STAYING ASLEEP: 0
SUM OF ALL RESPONSES TO PHQ9 QUESTIONS 1 & 2: 3
10. IF YOU CHECKED OFF ANY PROBLEMS, HOW DIFFICULT HAVE THESE PROBLEMS MADE IT FOR YOU TO DO YOUR WORK, TAKE CARE OF THINGS AT HOME, OR GET ALONG WITH OTHER PEOPLE: 2
6. FEELING BAD ABOUT YOURSELF - OR THAT YOU ARE A FAILURE OR HAVE LET YOURSELF OR YOUR FAMILY DOWN: 3
1. LITTLE INTEREST OR PLEASURE IN DOING THINGS: 3
SUM OF ALL RESPONSES TO PHQ QUESTIONS 1-9: 7

## 2021-05-05 ASSESSMENT — ENCOUNTER SYMPTOMS
VOMITING: 0
SORE THROAT: 0
NAUSEA: 0
SHORTNESS OF BREATH: 0
DIARRHEA: 0
ABDOMINAL PAIN: 0
COUGH: 0
CONSTIPATION: 0
BLOOD IN STOOL: 0
WHEEZING: 0
SINUS PAIN: 0

## 2021-05-06 LAB
ESTIMATED AVERAGE GLUCOSE: 116.9 MG/DL
HBA1C MFR BLD: 5.7 %

## 2021-05-20 ENCOUNTER — TELEPHONE (OUTPATIENT)
Dept: PSYCHOLOGY | Age: 61
End: 2021-05-20

## 2021-05-20 NOTE — TELEPHONE ENCOUNTER
Pt called to reschedule her appt today. She has another appt today and she is unable to come in. Her appt was rescheduled for the next available.

## 2021-05-21 ENCOUNTER — TELEPHONE (OUTPATIENT)
Dept: CARDIOLOGY CLINIC | Age: 61
End: 2021-05-21

## 2021-05-25 ENCOUNTER — TELEPHONE (OUTPATIENT)
Dept: CARDIOLOGY CLINIC | Age: 61
End: 2021-05-25

## 2021-05-25 NOTE — TELEPHONE ENCOUNTER
Medication Refill    Medication needing refilled:xarelto   NEW PHARMACY     NEW PHARMACY    Dosage of the medication:    How are you taking this medication (QD, BID, TID, QID, PRN):    30 or 90 day supply called in: 2 WEEK SUPPLY TO HOLD UNTIL MAIL ORDER COMES     When will you run out of your medication: OUT 2 DAYS    Which Pharmacy are we sending the medication to?:  [facility] [employer] [facility]

## 2021-05-27 ENCOUNTER — TELEPHONE (OUTPATIENT)
Dept: CARDIOLOGY CLINIC | Age: 61
End: 2021-05-27

## 2021-05-27 NOTE — TELEPHONE ENCOUNTER
Reviewed results with Patient. States she has had no recent events an was not aware of the events on the monitor. Questions answered. Scheduled follow up appt.

## 2021-05-27 NOTE — TELEPHONE ENCOUNTER
Reviewed monitor results which showed no atrail fibrillaiton, brief SVT. No medication changes. Results have been called by EP RN. She needs follow up with provider, non urgent first available NPAL.     JOSE Fuller-CNP

## 2021-05-27 NOTE — TELEPHONE ENCOUNTER
Patient calling office again for the results of her event monitor. See telephone encounter from 5/21/21. Last message on 5/21/21 states patient will be called when monitor results have been read. EOS report is in media. Patient can be reached at [phone] and she is requesting a call back with results.

## 2021-06-14 ENCOUNTER — OFFICE VISIT (OUTPATIENT)
Dept: PSYCHOLOGY | Age: 61
End: 2021-06-14
Payer: MEDICARE

## 2021-06-14 DIAGNOSIS — F43.21 ADJUSTMENT DISORDER WITH DEPRESSED MOOD: Primary | ICD-10-CM

## 2021-06-14 PROCEDURE — 90832 PSYTX W PT 30 MINUTES: CPT | Performed by: PSYCHOLOGIST

## 2021-06-14 PROCEDURE — 1036F TOBACCO NON-USER: CPT | Performed by: PSYCHOLOGIST

## 2021-06-14 ASSESSMENT — PATIENT HEALTH QUESTIONNAIRE - PHQ9
6. FEELING BAD ABOUT YOURSELF - OR THAT YOU ARE A FAILURE OR HAVE LET YOURSELF OR YOUR FAMILY DOWN: 3
5. POOR APPETITE OR OVEREATING: 3
10. IF YOU CHECKED OFF ANY PROBLEMS, HOW DIFFICULT HAVE THESE PROBLEMS MADE IT FOR YOU TO DO YOUR WORK, TAKE CARE OF THINGS AT HOME, OR GET ALONG WITH OTHER PEOPLE: 2
SUM OF ALL RESPONSES TO PHQ QUESTIONS 1-9: 21
8. MOVING OR SPEAKING SO SLOWLY THAT OTHER PEOPLE COULD HAVE NOTICED. OR THE OPPOSITE, BEING SO FIGETY OR RESTLESS THAT YOU HAVE BEEN MOVING AROUND A LOT MORE THAN USUAL: 2
SUM OF ALL RESPONSES TO PHQ QUESTIONS 1-9: 22
2. FEELING DOWN, DEPRESSED OR HOPELESS: 2
3. TROUBLE FALLING OR STAYING ASLEEP: 3
SUM OF ALL RESPONSES TO PHQ9 QUESTIONS 1 & 2: 4
1. LITTLE INTEREST OR PLEASURE IN DOING THINGS: 2
4. FEELING TIRED OR HAVING LITTLE ENERGY: 3
7. TROUBLE CONCENTRATING ON THINGS, SUCH AS READING THE NEWSPAPER OR WATCHING TELEVISION: 3
SUM OF ALL RESPONSES TO PHQ QUESTIONS 1-9: 22
9. THOUGHTS THAT YOU WOULD BE BETTER OFF DEAD, OR OF HURTING YOURSELF: 1

## 2021-06-14 ASSESSMENT — COLUMBIA-SUICIDE SEVERITY RATING SCALE - C-SSRS
1. WITHIN THE PAST MONTH, HAVE YOU WISHED YOU WERE DEAD OR WISHED YOU COULD GO TO SLEEP AND NOT WAKE UP?: NO
6. HAVE YOU EVER DONE ANYTHING, STARTED TO DO ANYTHING, OR PREPARED TO DO ANYTHING TO END YOUR LIFE?: NO
2. HAVE YOU ACTUALLY HAD ANY THOUGHTS OF KILLING YOURSELF?: NO

## 2021-06-14 NOTE — PROGRESS NOTES
Behavioral Health Consultation  Clari Manning, Ph.D.  Psychologist  6/14/2021  3:08 PM      Time spent with Patient: 22 minutes  This is patient's third  [facility] appointment. Reason for Consult:    Chief Complaint   Patient presents with    Depression       Feedback given to PCP. S:  Pt seen for f/u of depression. Seen for first appt in 5 mos. Pt reported worsened mood and sxs. Recently had 1 yr anniversary of mom's death. Attributes worsened mood to isolation 2/2 COVID. Noted depression resulted in her perceiving comments from others as having intent to be critical and later realizes it was a cognitive distortion. Noted frequent insomnia and desire for hypersomnia, fatigue, overeating, poor concentration. Pt adamantly denied suicidal thoughts, reported vague and passive thoughts of wanting to separate from the world, but adamantly denied intent, plan, or access to means. Has tried to start journaling. Wants to have a plan monthly and focus on her follow through. Journal, Bible study, exercise daily. Then 1 room/day cleaning. Revised goal to exercise 3x/wk/150 mins.      O:  MSE:    Appearance    alert, cooperative, crying  Appetite abnormal: high  Sleep disturbance Yes  Fatigue Yes  Loss of pleasure Yes  Impulsive behavior No  Speech    spontaneous, normal rate, normal volume and well articulated  Mood    Depressed  Affect    depressed affect  Thought Content    intact  Thought Process    linear, goal directed and coherent  Associations    logical connections  Insight    Fair  Judgment    Intact  Orientation    oriented to person, place, time, and general circumstances  Memory    recent and remote memory intact  Attention/Concentration    impaired  Morbid ideation No  Suicide Assessment    no suicidal ideation    History:  Social History:   Social History     Socioeconomic History    Marital status:      Spouse name: Not on file    Number of children: Not on file    Years of education: Not on file  Highest education level: Not on file   Occupational History    Not on file   Tobacco Use    Smoking status: Never Smoker    Smokeless tobacco: Never Used   Vaping Use    Vaping Use: Never used   Substance and Sexual Activity    Alcohol use: Yes     Comment: 2 drinks per month    Drug use: No    Sexual activity: Not on file   Other Topics Concern    Not on file   Social History Narrative    Not on file     Social Determinants of Health     Financial Resource Strain:     Difficulty of Paying Living Expenses:    Food Insecurity:     Worried About Running Out of Food in the Last Year:     Ran Out of Food in the Last Year:    Transportation Needs:     Lack of Transportation (Medical):  Lack of Transportation (Non-Medical):    Physical Activity:     Days of Exercise per Week:     Minutes of Exercise per Session:    Stress:     Feeling of Stress :    Social Connections:     Frequency of Communication with Friends and Family:     Frequency of Social Gatherings with Friends and Family:     Attends Alevism Services:     Active Member of Clubs or Organizations:     Attends Club or Organization Meetings:     Marital Status:    Intimate Partner Violence:     Fear of Current or Ex-Partner:     Emotionally Abused:     Physically Abused:     Sexually Abused:      TOBACCO:   reports that she has never smoked. She has never used smokeless tobacco.  ETOH:   reports current alcohol use.     A:  PHQ Scores 6/14/2021 5/5/2021 11/12/2020 11/12/2020 11/11/2020 5/12/2020 1/8/2020   PHQ2 Score 4 3 - 0 0 0 2   PHQ9 Score 22 7 0 0 0 0 3     Interpretation of Total Score Depression Severity: 1-4 = Minimal depression, 5-9 = Mild depression, 10-14 = Moderate depression, 15-19 = Moderately severe depression, 20-27 = Severe depression      Diagnosis:  Adjustment Disorder w depressed mood      Plan:  Pt interventions:  Discussed and set plan for behavioral activation        Documentation was done using voice recognition dragon software. Every effort was made to ensure accuracy; however, inadvertent, unintentional computerized transcription errors may be present.

## 2021-06-14 NOTE — PATIENT INSTRUCTIONS
1. Daily: journal, Bible study, and 1 room of cleaning  2. 3 times per week: exercise. Shoot for a total of 150 minutes per week.   3. 1 social/volunteering goal/week

## 2021-06-15 ENCOUNTER — TELEPHONE (OUTPATIENT)
Dept: CARDIOLOGY CLINIC | Age: 61
End: 2021-06-15

## 2021-06-15 NOTE — TELEPHONE ENCOUNTER
Pt states she only has 2 pills left on the Xarelto and  [address] is requesting clarification on dosage. Please call pharmacy to discuss and call pt to advise of outcome.

## 2021-07-27 DIAGNOSIS — I10 ESSENTIAL HYPERTENSION: ICD-10-CM

## 2021-07-28 ENCOUNTER — APPOINTMENT (OUTPATIENT)
Dept: CT IMAGING | Age: 61
End: 2021-07-28
Payer: MEDICARE

## 2021-07-28 ENCOUNTER — NURSE TRIAGE (OUTPATIENT)
Dept: OTHER | Facility: CLINIC | Age: 61
End: 2021-07-28

## 2021-07-28 ENCOUNTER — HOSPITAL ENCOUNTER (EMERGENCY)
Age: 61
Discharge: HOME | End: 2021-07-28
Payer: MEDICARE

## 2021-07-28 VITALS
BODY MASS INDEX: 48.2 KG/M2 | HEART RATE: 77 BPM | RESPIRATION RATE: 16 BRPM | SYSTOLIC BLOOD PRESSURE: 149 MMHG | OXYGEN SATURATION: 97 % | TEMPERATURE: 98.7 F | HEIGHT: 63 IN | DIASTOLIC BLOOD PRESSURE: 82 MMHG | WEIGHT: 272 LBS

## 2021-07-28 DIAGNOSIS — K62.5 RECTAL BLEEDING: Primary | ICD-10-CM

## 2021-07-28 LAB
A/G RATIO: 1.6 (ref 1.1–2.2)
ALBUMIN SERPL-MCNC: 4.3 G/DL (ref 3.4–5)
ALP BLD-CCNC: 107 U/L (ref 40–129)
ALT SERPL-CCNC: 28 U/L (ref 10–40)
ANION GAP SERPL CALCULATED.3IONS-SCNC: 11 MMOL/L (ref 3–16)
APTT: 41.2 SEC (ref 26.2–38.6)
AST SERPL-CCNC: 23 U/L (ref 15–37)
BASOPHILS ABSOLUTE: 0.1 K/UL (ref 0–0.2)
BASOPHILS RELATIVE PERCENT: 1.3 %
BILIRUB SERPL-MCNC: <0.2 MG/DL (ref 0–1)
BILIRUBIN URINE: NEGATIVE
BLOOD, URINE: NEGATIVE
BUN BLDV-MCNC: 16 MG/DL (ref 7–20)
CALCIUM SERPL-MCNC: 10.1 MG/DL (ref 8.3–10.6)
CHLORIDE BLD-SCNC: 104 MMOL/L (ref 99–110)
CLARITY: CLEAR
CO2: 24 MMOL/L (ref 21–32)
COLOR: YELLOW
CREAT SERPL-MCNC: 0.7 MG/DL (ref 0.6–1.2)
EOSINOPHILS ABSOLUTE: 0.1 K/UL (ref 0–0.6)
EOSINOPHILS RELATIVE PERCENT: 1.9 %
GFR AFRICAN AMERICAN: >60
GFR NON-AFRICAN AMERICAN: >60
GLOBULIN: 2.7 G/DL
GLUCOSE BLD-MCNC: 115 MG/DL (ref 70–99)
GLUCOSE URINE: NEGATIVE MG/DL
HCT VFR BLD CALC: 38.4 % (ref 36–48)
HEMOGLOBIN: 12.8 G/DL (ref 12–16)
INR BLD: 0.95 (ref 0.88–1.12)
KETONES, URINE: NEGATIVE MG/DL
LEUKOCYTE ESTERASE, URINE: NEGATIVE
LIPASE: 57 U/L (ref 13–60)
LYMPHOCYTES ABSOLUTE: 1.4 K/UL (ref 1–5.1)
LYMPHOCYTES RELATIVE PERCENT: 22.3 %
MCH RBC QN AUTO: 29.1 PG (ref 26–34)
MCHC RBC AUTO-ENTMCNC: 33.3 G/DL (ref 31–36)
MCV RBC AUTO: 87.2 FL (ref 80–100)
MICROSCOPIC EXAMINATION: NORMAL
MONOCYTES ABSOLUTE: 0.3 K/UL (ref 0–1.3)
MONOCYTES RELATIVE PERCENT: 5.4 %
NEUTROPHILS ABSOLUTE: 4.3 K/UL (ref 1.7–7.7)
NEUTROPHILS RELATIVE PERCENT: 69.1 %
NITRITE, URINE: NEGATIVE
OCCULT BLOOD DIAGNOSTIC: NORMAL
PDW BLD-RTO: 14.2 % (ref 12.4–15.4)
PH UA: 6.5 (ref 5–8)
PLATELET # BLD: 237 K/UL (ref 135–450)
PMV BLD AUTO: 7.3 FL (ref 5–10.5)
POTASSIUM REFLEX MAGNESIUM: 4.2 MMOL/L (ref 3.5–5.1)
PROTEIN UA: NEGATIVE MG/DL
PROTHROMBIN TIME: 10.7 SEC (ref 9.9–12.7)
RBC # BLD: 4.4 M/UL (ref 4–5.2)
SODIUM BLD-SCNC: 139 MMOL/L (ref 136–145)
SPECIFIC GRAVITY UA: 1.02 (ref 1–1.03)
TOTAL PROTEIN: 7 G/DL (ref 6.4–8.2)
URINE REFLEX TO CULTURE: NORMAL
URINE TYPE: NORMAL
UROBILINOGEN, URINE: 1 E.U./DL
WBC # BLD: 6.3 K/UL (ref 4–11)

## 2021-07-28 PROCEDURE — 36415 COLL VENOUS BLD VENIPUNCTURE: CPT

## 2021-07-28 PROCEDURE — 83690 ASSAY OF LIPASE: CPT

## 2021-07-28 PROCEDURE — 85025 COMPLETE CBC W/AUTO DIFF WBC: CPT

## 2021-07-28 PROCEDURE — G0328 FECAL BLOOD SCRN IMMUNOASSAY: HCPCS

## 2021-07-28 PROCEDURE — 81003 URINALYSIS AUTO W/O SCOPE: CPT

## 2021-07-28 PROCEDURE — 74174 CTA ABD&PLVS W/CONTRAST: CPT

## 2021-07-28 PROCEDURE — 85610 PROTHROMBIN TIME: CPT

## 2021-07-28 PROCEDURE — 85730 THROMBOPLASTIN TIME PARTIAL: CPT

## 2021-07-28 PROCEDURE — 99283 EMERGENCY DEPT VISIT LOW MDM: CPT

## 2021-07-28 PROCEDURE — 6360000004 HC RX CONTRAST MEDICATION: Performed by: PHYSICIAN ASSISTANT

## 2021-07-28 PROCEDURE — 80053 COMPREHEN METABOLIC PANEL: CPT

## 2021-07-28 RX ORDER — METOPROLOL SUCCINATE 25 MG/1
TABLET, EXTENDED RELEASE ORAL
Qty: 180 TABLET | OUTPATIENT
Start: 2021-07-28

## 2021-07-28 RX ADMIN — IOPAMIDOL 100 ML: 755 INJECTION, SOLUTION INTRAVENOUS at 19:23

## 2021-07-28 NOTE — TELEPHONE ENCOUNTER
Reason for Disposition   High-risk adult (e.g., prior surgery on aorta, abdominal aortic aneurysm)    Answer Assessment - Initial Assessment Questions  1. APPEARANCE of BLOOD: \"What color is it? \" \"Is it passed separately, on the surface of the stool, or mixed in with the stool? \"       Blood is in the stool and also in the water. 2. AMOUNT: \"How much blood was passed? \"       Pt estimates that it is about 1/4 to 1/2 cup of blood    3. FREQUENCY: \"How many times has blood been passed with the stools? \"       One time this am    4. ONSET: \"When was the blood first seen in the stools? \" (Days or weeks)       7/28/21    5. DIARRHEA: \"Is there also some diarrhea? \" If so, ask: \"How many diarrhea stools were passed in past 24 hours? \"       No diarrhea - the stool was formed    6. CONSTIPATION: \"Do you have constipation? \" If so, \"How bad is it? \"      No constipation lately    7. RECURRENT SYMPTOMS: \"Have you had blood in your stools before? \" If so, ask: \"When was the last time? \" and \"What happened that time? \"       Not ever happened before    8. BLOOD THINNERS: \"Do you take any blood thinners? \" (e.g., Coumadin/warfarin, Pradaxa/dabigatran, aspirin)      Xaralto    9. OTHER SYMPTOMS: \"Do you have any other symptoms? \"  (e.g., abdominal pain, vomiting, dizziness, fever)      Some lightheadedness off and on for a while    10. PREGNANCY: \"Is there any chance you are pregnant? \" \"When was your last menstrual period? \"        n/a    Protocols used: RECTAL BLEEDING-ADULT-[place]    Received call from Reva Ann at [facility] with [employer] [facility]. Brief description of triage: pt reports BRB per rectum and the blood is in the stool as well as in the water. HX of Afib and is on Xaralto. Also HX of diverticulitis. Pt reports no rectal pain. Triage indicates for patient to go to the ED now.     Care advice provided, patient verbalizes understanding; denies any other questions or concerns; instructed to call back for any new or worsening symptoms. Attention Provider: Thank you for allowing me to participate in the care of your patient. The patient was connected to triage in response to information provided to the ECC. Please do not respond through this encounter as the response is not directed to a shared pool.

## 2021-07-28 NOTE — ED PROVIDER NOTES
[facility]        Pt Name: Tiffanie Junior  MRN: [id number]  [place] 1960  Date of evaluation: 7/28/2021  Provider: Cooper Bird PA-C  PCP: JOSE Contreras CNP  Note Started: 6:09 PM EDT       GIORGIO. I have evaluated this patient. My supervising physician was available for consultation. CHIEF COMPLAINT       Chief Complaint   Patient presents with    Rectal Bleeding     Pt endorses bright red blood in stools that began this morning. Pt is on xarelto. HISTORY OF PRESENT ILLNESS   (Location, Timing/Onset, Context/Setting, Quality, Duration, Modifying Factors, Severity, Associated Signs and Symptoms)  Note limiting factors. Chief Complaint: Bright red blood per rectum    Tiffanie Junior is a 61 y.o. female who presents to the emergency department with a chief complaint of one episode of bright red blood per rectum about 6 hours before presenting to the emergency department. She states this was streaked into the stool and in the bowl. She is on Xarelto. She denies any syncope. States that she has been having some chronic issues for multiple months with some dizziness, nausea and belching but this is not new today. She states she also has fibromyalgia and has some cramping in her abdomen at baseline and states it may feel slightly different today but she is having some difficulty telling whether this is new or not. Denies vomiting, fevers, chest pain, dysuria, hematuria or any other symptoms. Had a colonoscopy last November and states she had some polyps removed but denies any history of malignancy. Nursing Notes were all reviewed and agreed with or any disagreements were addressed in the HPI. REVIEW OF SYSTEMS    (2-9 systems for level 4, 10 or more for level 5)     Review of Systems    Positives and Pertinent negatives as per HPI.  Except as noted above in the ROS, all other systems were reviewed and negative.        PAST MEDICAL HISTORY     Past Medical History:   Diagnosis Date    A-fib ([address]     Anxiety     Asthma     Back pain     Depression     Fibromyalgia     Hyperlipidemia     Hypertension     Migraines     Other specified hypothyroidism 5/5/2021    Prediabetes     Unspecified sleep apnea     Urinary incontinence          SURGICAL HISTORY     Past Surgical History:   Procedure Laterality Date    BREAST ENHANCEMENT SURGERY      COLONOSCOPY N/A 11/27/2019    COLONOSCOPY POLYPECTOMY SNARE/COLD BIOPSY performed by Kingston Scheuermann, MD at [address] 2017    TONSILLECTOMY  1968    TUBAL LIGATION           [address]       Discharge Medication List as of 7/28/2021  9:31 PM      CONTINUE these medications which have NOT CHANGED    Details   !! rivaroxaban (XARELTO) 20 MG TABS tablet Take 1 tablet by mouth Daily with supper, Disp-30 tablet, R-0Normal      !! rivaroxaban (XARELTO) 20 MG TABS tablet Take 1 tablet by mouth daily (with breakfast), Disp-90 tablet, R-1Please call patient prior to refilling to determine timing, she required short term Rx to be sent to there local pharmacyNormal      montelukast (SINGULAIR) 10 MG tablet Take 1 tablet by mouth nightly, Disp-90 tablet, R-1Normal      colestipol (COLESTID) 1 g tablet Take 1 tablet by mouth 2 times daily, Disp-180 tablet, R-1Normal      levothyroxine (SYNTHROID) 112 MCG tablet Take 1 tablet by mouth daily, Disp-90 tablet, R-1Normal      DULoxetine (CYMBALTA) 60 MG extended release capsule Take 1 capsule by mouth daily, Disp-90 capsule, R-1Normal      cetirizine (ZYRTEC) 10 MG tablet Take 1 tablet by mouth daily, Disp-90 tablet, R-0Normal      metoprolol succinate (TOPROL XL) 25 MG extended release tablet Take 1 tablet by mouth daily, Disp-90 tablet, R-1Normal      zinc gluconate 50 MG tablet Take 50 mg by mouth dailyHistorical Med      magnesium 200 MG TABS tablet Take 200 mg by mouth dailyHistorical Med      vitamin D (ERGOCALCIFEROL) 1.25 MG (08045 UT) CAPS capsule Take 1 capsule by mouth every 14 days, Disp-6 capsule,R-1Normal      acetaminophen (TYLENOL 8 HOUR) 650 MG extended release tablet Take 650 mg by mouth every 8 hours as needed for PainHistorical Med       !! - Potential duplicate medications found. Please discuss with provider. ALLERGIES     Latex, Prednisone, and Statins    FAMILYHISTORY       Family History   Problem Relation Age of Onset    Cancer Other     Cancer Other     Cancer Maternal Uncle         melanoma    Arthritis Mother     Diabetes Mother     Heart Disease Mother         a-fib    COPD Mother     Cancer Mother         skin    Ovarian Cancer Mother     Stroke Mother     COPD Father     Arthritis Father     Cancer Father         lung, prostate    COPD Brother     Stroke Brother     Glaucoma Maternal Grandmother     Breast Cancer Maternal Grandmother         breast    Heart Attack Neg Hx           SOCIAL HISTORY       Social History     Tobacco Use    Smoking status: Never Smoker    Smokeless tobacco: Never Used   Vaping Use    Vaping Use: Never used   Substance Use Topics    Alcohol use: Yes     Comment: 2 drinks per month    Drug use: No       SCREENINGS             PHYSICAL EXAM    (up to 7 for level 4, 8 or more for level 5)     ED Triage Vitals [07/28/21 1739]   BP Temp Temp Source Pulse Resp SpO2 Height Weight   (!) 163/95 98.7 °F (37.1 °C) Oral 77 16 97 % 5' 3\" (1.6 m) 272 lb (123.4 kg)       Physical Exam  Vitals and nursing note reviewed. Constitutional:       Appearance: She is well-developed. She is not diaphoretic. HENT:      Head: Atraumatic. Nose: Nose normal.   Eyes:      General:         Right eye: No discharge. Left eye: No discharge. Conjunctiva/sclera: Conjunctivae normal.   Cardiovascular:      Rate and Rhythm: Normal rate and regular rhythm.       Heart sounds: No murmur heard.   No friction rub. No gallop. Pulmonary:      Effort: Pulmonary effort is normal. No respiratory distress. Breath sounds: No stridor. No wheezing, rhonchi or rales. Abdominal:      General: Bowel sounds are normal. There is no distension. Palpations: Abdomen is soft. There is no mass. Tenderness: There is no abdominal tenderness. There is no guarding or rebound. Hernia: No hernia is present. Genitourinary:     Comments: Rectal exam performed with female chaperone at bedside reveals no external hemorrhoid, anal fissure. Stool is light brown in color. No gross melena hematochezia. No mass palpated. Musculoskeletal:         General: No swelling. Normal range of motion. Cervical back: Normal range of motion. Skin:     General: Skin is warm and dry. Findings: No erythema or rash. Neurological:      Mental Status: She is alert and oriented to person, place, and time. Cranial Nerves: No cranial nerve deficit.    Psychiatric:         Behavior: Behavior normal.         DIAGNOSTIC RESULTS   LABS:    Labs Reviewed   COMPREHENSIVE METABOLIC PANEL W/ REFLEX TO MG FOR LOW K - Abnormal; Notable for the following components:       Result Value    Glucose 115 (*)     All other components within normal limits    Narrative:     Performed at:  [facility]  [address] [facility], [address]   Phone [phone]   APTT - Abnormal; Notable for the following components:    aPTT 41.2 (*)     All other components within normal limits    Narrative:     Performed at:  [facility]  [address] [facility], [address]   Phone [phone]   CBC WITH AUTO DIFFERENTIAL    Narrative:     Performed at:  [facility]  [address] [facility], [address]   Phone [phone]   LIPASE    Narrative:     Performed at:  [facility]  [address] [facility], [place] [address]   Phone [phone]   PROTIME-INR    Narrative:     Performed at:  [facility]  [facility], [address] [place] [address]   Phone [phone]   URINE RT REFLEX TO CULTURE    Narrative:     Performed at:  [facility]  [facility], [address] [place] [address]   Phone [phone]   BLOOD OCCULT STOOL DIAGNOSTIC    Narrative:     ORDER#: [id number]                          ORDERED BY: Calixto Warner  SOURCE: Stool                              COLLECTED:  07/28/21 20:02  ANTIBIOTICS AT FAHAD.:                      RECEIVED :  07/28/21 20:12  Performed at:  [facility] [address], [place], [address] [place] [address]   Phone [phone]       When ordered only abnormal lab results are displayed. All other labs were within normal range or not returned as of this dictation. EKG: When ordered, EKG's are interpreted by the Emergency Department Physician in the absence of a cardiologist.  Please see their note for interpretation of EKG. RADIOLOGY:   Non-plain film images such as CT, Ultrasound and MRI are read by the radiologist. Plain radiographic images are visualized and preliminarily interpreted by the ED Provider with the below findings:        Interpretation per the Radiologist below, if available at the time of this note:     [facility] [address]   Final Result   No active gastrointestinal hemorrhage. The etiology of bleeding is unknown. There is pancolonic diverticulosis, most prominent in the descending region. No acute diverticulitis. No acute inflammatory abnormality is appreciated. No results found.         PROCEDURES   Unless otherwise noted below, none     Procedures    CRITICAL CARE TIME   N/A    CONSULTS:  None      EMERGENCY DEPARTMENT COURSE and DIFFERENTIAL DIAGNOSIS/MDM:   Vitals:    Vitals:    07/28/21 1830 07/28/21 1845 07/28/21 1900 07/28/21 2015   BP: (!) 163/81 (!) 155/85 (!) 144/66 (!) 149/82   Pulse:       Resp:       Temp:       TempSrc:       SpO2:       Weight:       Height:           Patient was given the following medications:  Medications   iopamidol (ISOVUE-370) 76 % injection 100 mL (100 mLs Intravenous Given 7/28/21 1923)           Patient presented with some bright red blood per rectum. States this happened 1 time today. There is no blood noted on rectal exam.  She is anticoagulated on Xarelto. Hemoglobin is stable. Her one episode happened about 7 hours before presenting to the emergency department. She does have some pain in her abdomen but states that she has some chronic pain related to her fibromyalgia and her abdomen really denies any change in this. CT imaging reveals no acute bleed or acute abnormalities. She is had no vomiting here. States that she had no blood when she went to the restroom here. Unsure of what caused this 1 episode of rectal bleeding at home. Work-up here is unremarkable. Vitals are unremarkable. She will follow-up with her GI doctor as an outpatient. Do not believe she requires admission and have low suspicion for mesenteric ischemia, AAA, brisk GI bleed requiring emergent colonoscopy and admission or other emergent etiology. She will return here for any worsening of symptoms or problems at home. FINAL IMPRESSION      1.  Rectal bleeding          DISPOSITION/PLAN   DISPOSITION Decision To Discharge 07/28/2021 09:19:07 PM      PATIENT REFERRED TO:  Paul Julio MD  [address] [place] [address]  [phone]    Schedule an appointment as soon as possible for a visit in 3 days  For re-check    Román Mcadams, APRN - [address]  [phone]    Schedule an appointment as soon as possible for a visit in 3 days      [facility] Emergency Department  [address] [facility]  [phone]    As needed      DISCHARGE MEDICATIONS:  Discharge Medication List as of 7/28/2021  9:31 PM          DISCONTINUED MEDICATIONS:  Discharge Medication List as of 7/28/2021  9:31 PM                 (Please note that portions of this note were completed with a voice recognition program.  Efforts were made to edit the dictations but occasionally words are mis-transcribed.)    Rich Dumont PA-C (electronically signed)            Rich Dumont PA-C  07/28/21 3229

## 2021-08-10 ENCOUNTER — OFFICE VISIT (OUTPATIENT)
Dept: CARDIOLOGY CLINIC | Age: 61
End: 2021-08-10
Payer: MEDICARE

## 2021-08-10 VITALS
OXYGEN SATURATION: 97 % | BODY MASS INDEX: 46.78 KG/M2 | HEART RATE: 85 BPM | HEIGHT: 64 IN | SYSTOLIC BLOOD PRESSURE: 153 MMHG | DIASTOLIC BLOOD PRESSURE: 89 MMHG | WEIGHT: 274 LBS

## 2021-08-10 DIAGNOSIS — I10 BENIGN ESSENTIAL HTN: ICD-10-CM

## 2021-08-10 DIAGNOSIS — I48.0 PAF (PAROXYSMAL ATRIAL FIBRILLATION) (HCC): Primary | ICD-10-CM

## 2021-08-10 DIAGNOSIS — R42 DIZZINESS: ICD-10-CM

## 2021-08-10 DIAGNOSIS — E66.01 MORBID OBESITY (HCC): ICD-10-CM

## 2021-08-10 DIAGNOSIS — G47.33 OSA (OBSTRUCTIVE SLEEP APNEA): ICD-10-CM

## 2021-08-10 PROCEDURE — G8417 CALC BMI ABV UP PARAM F/U: HCPCS | Performed by: NURSE PRACTITIONER

## 2021-08-10 PROCEDURE — 3017F COLORECTAL CA SCREEN DOC REV: CPT | Performed by: NURSE PRACTITIONER

## 2021-08-10 PROCEDURE — 99214 OFFICE O/P EST MOD 30 MIN: CPT | Performed by: NURSE PRACTITIONER

## 2021-08-10 PROCEDURE — 93000 ELECTROCARDIOGRAM COMPLETE: CPT | Performed by: NURSE PRACTITIONER

## 2021-08-10 PROCEDURE — 1036F TOBACCO NON-USER: CPT | Performed by: NURSE PRACTITIONER

## 2021-08-10 PROCEDURE — G8427 DOCREV CUR MEDS BY ELIG CLIN: HCPCS | Performed by: NURSE PRACTITIONER

## 2021-08-10 NOTE — PATIENT INSTRUCTIONS
- Stay hydrated  - Continue current medications  - Wear CPAP  - Check your blood pressure at home, if your top number blood pressure is >140/90 or <90/50 please call the office  - Emergency room if symptoms become severe

## 2021-08-10 NOTE — PROGRESS NOTES
[address]   Electrophysiology  JOSE Rodríguez-CNP  Attending EP: Dr. Maxi Go  Date: 8/10/2021  I had the privilege of visiting Enedina Saleem in the office. Chief Complaint:   Chief Complaint   Patient presents with    Atrial Fibrillation    Follow-up     [facility]      History of Present Illness: History obtained from patient and medical record. Enedina Saleem is 61 y.o. female with a past medical history of HTN, HLD, fibromyalgia, TONY, asthma, and atrial fibrillation. Wore a monitor that showed 25% AF burden and elected for ablation. S/p RFCA of afib w/ PVI and typical flutter (10/2/2020). Interval history: Today, Enedina Saleem is being seen for PAF and bradycardia. Event monitor 3/30/2021-4/28/2021 showed brief atrial run 11 seconds, no AF, and avg HR 69 (). Reports that she had sudden onset of dizziness when she came in today for her appt today. Accompanied by epigastric pressure. ECG shows SR and her VSS. She has not had episodes like this in the past, however she does have episodes of brief lightheadedness at baseline. She feels could be related to heat. She has hx of a hernia and has been having issues with digestion. Follows with GI. Reports that she has been to the ER 2 times in the last 2 weeks. Once for internal Hemorrhoid that caused bleeding and the other time for SOB and hypoxia when waking up in the morning after not wearing her CPAP. She was not admitted for either of these. She cannot feel episodes of AF. Denies palpitations or CP. Admits to chronic, intermittent, mild dependent edema. She has sleeps elevated in recliner. She checks BP at home and normally 130/80. She has skipped her CPAP at times, however she has been wearing it most nights, unfortunately she has some broken tubing/mask and she will receive new on Friday. Denies having chest pain, palpitations, shortness of breath at the time of this visit.     With regard to medication therapy the patient has been compliant with prescribed regimen. She has tolerated therapy to date. Assessment:  Paroxysmal Atrial Fibrillation  - ECG today shows SR  - Continue Toprol 25 mg daily   - Admits to 1 episode since ablation  - [id number] score: 2 (gender, HTN) ; [id number] Vasc score and anticoagulation discussed. High risk for stroke and thromboembolism. Anticoagulation is recommended.   ~ On Xarelto 20 mg daily, no s/s of bleeding  - Afib risk factors including age, HTN, obesity, inactivity and TONY were discussed with patient. Risk factor modification recommended   ~ TSH 0.35 (6/2/2020)     - Treatment options including cardioversion, rate control strategy, antiarrhythmics, anticoagulation and possible ablation were discussed with patient. Risks, benefits and alternative of each treatment options were explained. All questions answered    ~ S/p RFCA of afib w/ PVI and typical flutter (10/2/2020)  Sinus Bradycardia    - Resolved   - ECG today shows SR   - Did not tolerate flecainide in the past due to bradycardia  Dizziness   - Likely secondary to heat, exertion, and dehydration   - Resolved in office with glass of water and VSS, no hypoxia, ECG normal  HTN-goal <130/80   - Uncontrolled, BP at home have been 130/80   - She will monitor BP at home and call if elevated    - Encouraged patient to check BP at home, log and bring to office visits  - Discussed lifestyle modifications, weight loss, low sodium diet  TONY   - Adherent to therapy   - Discussed long term effects of unmanaged TONY on heart   Obesity: Body mass index is 47.03 kg/m². - Excessive weight is complicating assessment treatment. It is placing patient at risk for multiple co-morbidities as well as early death contributing to the patient's presentations. - Discussed weight loss and lifestyle modifications.      Plan  - No medication changes  - Follow up with GI  - Follow up with PCP  - Call office if cardiac symptoms occur, monitor BP at home and call if elevated with parameters    F/U: Follow-up with EP in 6 months  -Follow up with device clinic as scheduled  -Call [facility] at [phone] with any questions    Allergies: Allergies   Allergen Reactions    Latex Rash    Prednisone      Other reaction(s): Per patient-blow up    Statins Nausea Only     Home Medications:  Prior to Visit Medications    Medication Sig Taking?  Authorizing Provider   rivaroxaban (XARELTO) 20 MG TABS tablet Take 1 tablet by mouth Daily with supper  JOSE Rodriguez CNP   rivaroxaban (XARELTO) 20 MG TABS tablet Take 1 tablet by mouth daily (with breakfast)  JOSE Rodriguez CNP   montelukast (SINGULAIR) 10 MG tablet Take 1 tablet by mouth nightly  JOSE Kim CNP   colestipol (COLESTID) 1 g tablet Take 1 tablet by mouth 2 times daily  JOSE Kim CNP   levothyroxine (SYNTHROID) 112 MCG tablet Take 1 tablet by mouth daily  JOSE Kim CNP   DULoxetine (CYMBALTA) 60 MG extended release capsule Take 1 capsule by mouth daily  JOSE Kim CNP   cetirizine (ZYRTEC) 10 MG tablet Take 1 tablet by mouth daily  JOSE Kim CNP   metoprolol succinate (TOPROL XL) 25 MG extended release tablet Take 1 tablet by mouth daily  JOSE Curry CNP   zinc gluconate 50 MG tablet Take 50 mg by mouth daily  Historical Provider, MD   magnesium 200 MG TABS tablet Take 200 mg by mouth daily  Historical Provider, MD   vitamin D (ERGOCALCIFEROL) 1.25 MG (25580 UT) CAPS capsule Take 1 capsule by mouth every 14 days  Anshul Kunz MD   acetaminophen (TYLENOL 8 HOUR) 650 MG extended release tablet Take 650 mg by mouth every 8 hours as needed for Pain  Historical Provider, MD      Past Medical History:  Past Medical History:   Diagnosis Date    [address] [address]     Anxiety     Asthma     Back pain     Depression     Fibromyalgia     Hyperlipidemia     Hypertension     Migraines     Other specified hypothyroidism 5/5/2021    Prediabetes     Unspecified sleep apnea     Urinary incontinence      Past Surgical History:    has a past surgical history that includes Hysterectomy; Tubal ligation; Tonsillectomy (1968); knee surgery (Left, 2017); Colonoscopy (N/A, 11/27/2019); Breast enhancement surgery; and cyst removal (1960). Social History:  Reviewed. reports that she has never smoked. She has never used smokeless tobacco. She reports current alcohol use. She reports that she does not use drugs. Family History:  Reviewed. family history includes Arthritis in her father and mother; Breast Cancer in her maternal grandmother; COPD in her brother, father, and mother; Cancer in her father, maternal uncle, mother, and other family members; Diabetes in her mother; Glaucoma in her maternal grandmother; Heart Disease in her mother; Ovarian Cancer in her mother; Stroke in her brother and mother. Denies family history of sudden cardiac death, arrhythmia, premature CAD    Review of System:  · Constitutional: No weight changes or weakness  · HEENT: No visual changes. No mouth sores or sore throat. · Cardiovascular: denies chest pain, denies dyspnea on exertion, denies palpitations or denies loss of consciousness. No cough, hemoptysis, denies pleuritic pain, or phlebitis. reports dizziness. · Respiratory: denies cough or wheezing. · Gastrointestinal: Negative, No blood in stools. · Genitourinary: No hematuria. · Neurological: No focal weakness  · Psychiatric: No confusion, anxiety, or depression. · Hem/Lymph: Denies abnormal bruising or bleeding. Physical Examination:  There were no vitals filed for this visit.    Wt Readings from Last 3 Encounters:   07/28/21 272 lb (123.4 kg)   05/05/21 269 lb (122 kg)   02/17/21 269 lb (122 kg)      Constitutional: Cooperative and in no apparent distress, and appears well nourished   Skin: Warm and pink; no cyanosis or bruising   HEENT: counseled to avoid excess caffeine, and energy drinks as this may exacerbated ectopy and arrhythmia   The patient is counseled to lose weight to control cardiovascular risk factors   Exercise program discussed: To improve overall cardiovascular health, the patient is instructed to increase cardiovascular related activities with a goal of 150 min/week of moderate level activity or 10,000 steps per day. Encouraged to perform as much activity as tolerated     I have addressed the patient's cardiac risk factors and adjusted pharmacologic treatment as needed. In addition, I have reinforced the need for patient directed risk factor modification. I independently reviewed the ECG    All questions and concerns were addressed with the patient. Alternatives to treatment were discussed. Thank you for allowing to us to participate in the care of Shin Hale.     REJI Dacosta  [facility] [place]   Office: [phone]

## 2021-08-19 ENCOUNTER — TELEPHONE (OUTPATIENT)
Dept: INTERNAL MEDICINE CLINIC | Age: 61
End: 2021-08-19

## 2021-08-19 ENCOUNTER — OFFICE VISIT (OUTPATIENT)
Dept: PSYCHOLOGY | Age: 61
End: 2021-08-19
Payer: MEDICARE

## 2021-08-19 ENCOUNTER — OFFICE VISIT (OUTPATIENT)
Dept: RHEUMATOLOGY | Age: 61
End: 2021-08-19
Payer: MEDICARE

## 2021-08-19 VITALS
OXYGEN SATURATION: 95 % | DIASTOLIC BLOOD PRESSURE: 90 MMHG | BODY MASS INDEX: 46.69 KG/M2 | SYSTOLIC BLOOD PRESSURE: 132 MMHG | WEIGHT: 272 LBS | HEART RATE: 79 BPM

## 2021-08-19 DIAGNOSIS — F43.21 ADJUSTMENT DISORDER WITH DEPRESSED MOOD: Primary | ICD-10-CM

## 2021-08-19 DIAGNOSIS — M79.7 FIBROMYALGIA: Primary | ICD-10-CM

## 2021-08-19 PROCEDURE — 1036F TOBACCO NON-USER: CPT | Performed by: PSYCHOLOGIST

## 2021-08-19 PROCEDURE — G8427 DOCREV CUR MEDS BY ELIG CLIN: HCPCS | Performed by: INTERNAL MEDICINE

## 2021-08-19 PROCEDURE — 3017F COLORECTAL CA SCREEN DOC REV: CPT | Performed by: INTERNAL MEDICINE

## 2021-08-19 PROCEDURE — 99213 OFFICE O/P EST LOW 20 MIN: CPT | Performed by: INTERNAL MEDICINE

## 2021-08-19 PROCEDURE — G8417 CALC BMI ABV UP PARAM F/U: HCPCS | Performed by: INTERNAL MEDICINE

## 2021-08-19 PROCEDURE — 90832 PSYTX W PT 30 MINUTES: CPT | Performed by: PSYCHOLOGIST

## 2021-08-19 PROCEDURE — 1036F TOBACCO NON-USER: CPT | Performed by: INTERNAL MEDICINE

## 2021-08-19 RX ORDER — CYCLOBENZAPRINE HCL 10 MG
10 TABLET ORAL NIGHTLY PRN
Qty: 90 TABLET | Refills: 0 | Status: SHIPPED | OUTPATIENT
Start: 2021-08-19 | End: 2021-11-02

## 2021-08-19 RX ORDER — CYCLOBENZAPRINE HCL 10 MG
10 TABLET ORAL 3 TIMES DAILY PRN
COMMUNITY
End: 2021-12-10

## 2021-08-19 ASSESSMENT — PATIENT HEALTH QUESTIONNAIRE - PHQ9
4. FEELING TIRED OR HAVING LITTLE ENERGY: 1
3. TROUBLE FALLING OR STAYING ASLEEP: 1
SUM OF ALL RESPONSES TO PHQ QUESTIONS 1-9: 12
SUM OF ALL RESPONSES TO PHQ QUESTIONS 1-9: 9
9. THOUGHTS THAT YOU WOULD BE BETTER OFF DEAD, OR OF HURTING YOURSELF: 3
6. FEELING BAD ABOUT YOURSELF - OR THAT YOU ARE A FAILURE OR HAVE LET YOURSELF OR YOUR FAMILY DOWN: 3
SUM OF ALL RESPONSES TO PHQ QUESTIONS 1-9: 12
5. POOR APPETITE OR OVEREATING: 1
8. MOVING OR SPEAKING SO SLOWLY THAT OTHER PEOPLE COULD HAVE NOTICED. OR THE OPPOSITE, BEING SO FIGETY OR RESTLESS THAT YOU HAVE BEEN MOVING AROUND A LOT MORE THAN USUAL: 0
7. TROUBLE CONCENTRATING ON THINGS, SUCH AS READING THE NEWSPAPER OR WATCHING TELEVISION: 1
SUM OF ALL RESPONSES TO PHQ9 QUESTIONS 1 & 2: 2
2. FEELING DOWN, DEPRESSED OR HOPELESS: 1
1. LITTLE INTEREST OR PLEASURE IN DOING THINGS: 1
10. IF YOU CHECKED OFF ANY PROBLEMS, HOW DIFFICULT HAVE THESE PROBLEMS MADE IT FOR YOU TO DO YOUR WORK, TAKE CARE OF THINGS AT HOME, OR GET ALONG WITH OTHER PEOPLE: 2

## 2021-08-19 ASSESSMENT — COLUMBIA-SUICIDE SEVERITY RATING SCALE - C-SSRS
2. HAVE YOU ACTUALLY HAD ANY THOUGHTS OF KILLING YOURSELF?: NO
6. HAVE YOU EVER DONE ANYTHING, STARTED TO DO ANYTHING, OR PREPARED TO DO ANYTHING TO END YOUR LIFE?: NO
1. WITHIN THE PAST MONTH, HAVE YOU WISHED YOU WERE DEAD OR WISHED YOU COULD GO TO SLEEP AND NOT WAKE UP?: NO

## 2021-08-19 NOTE — PROGRESS NOTES
Assessment    no suicidal ideation    History:  Social History:   Social History     Socioeconomic History    Marital status:      Spouse name: Not on file    Number of children: Not on file    Years of education: Not on file    Highest education level: Not on file   Occupational History    Not on file   Tobacco Use    Smoking status: Never Smoker    Smokeless tobacco: Never Used   Vaping Use    Vaping Use: Never used   Substance and Sexual Activity    Alcohol use: Yes     Comment: 2 drinks per month    Drug use: No    Sexual activity: Not on file   Other Topics Concern    Not on file   Social History Narrative    Not on file     Social Determinants of Health     Financial Resource Strain:     Difficulty of Paying Living Expenses:    Food Insecurity:     Worried About Running Out of Food in the Last Year:     [address] in the Last Year:    Transportation Needs:     Lack of Transportation (Medical):  Lack of Transportation (Non-Medical):    Physical Activity:     Days of Exercise per Week:     Minutes of Exercise per Session:    Stress:     Feeling of Stress :    Social Connections:     Frequency of Communication with Friends and Family:     Frequency of Social Gatherings with Friends and Family:     Attends [employer] Services:     Active Member of Clubs or Organizations:     Attends Club or Organization Meetings:     Marital Status:    Intimate Partner Violence:     Fear of Current or Ex-Partner:     Emotionally Abused:     Physically Abused:     Sexually Abused:      TOBACCO:   reports that she has never smoked. She has never used smokeless tobacco.  ETOH:   reports current alcohol use. A:  Administered PHQ-9 (see below). Patient endorses moderate symptoms of depression. Denied active SI/HI.      PHQ Scores 8/19/2021 6/14/2021 5/5/2021 11/12/2020 11/12/2020 11/11/2020 5/12/2020   PHQ2 Score 2 4 3 - 0 0 0   PHQ9 Score 12 22 7 0 0 0 0     Interpretation of Total Score Depression Severity: 1-4 = Minimal depression, 5-9 = Mild depression, 10-14 = Moderate depression, 15-19 = Moderately severe depression, 20-27 = Severe depression        Diagnosis:  Adjustment Disorder w depressed mood    Plan:  Pt interventions:  Trained in strategies for increasing balanced thinking and Discussed and set plan for behavioral activation        Documentation was done using voice recognition dragon software. Every effort was made to ensure accuracy; however, inadvertent, unintentional computerized transcription errors may be present.

## 2021-08-19 NOTE — PROGRESS NOTES
Subjective:       Tiffanie Junior is a 61 y.o. female   The patient returns for follow-up of fibromyalgia. She is no longer using gabapentin. She is using Flexeril 10 mg daily and she continues on Cymbalta 60 mg a day. She is recently had a recurrence of psoriasis on her right knee which she had many years ago. Review of Systems:    Myalgias improved. Dime size psoriatic plaque right knee    Objective:     BP (!) 132/90 (Site: Left Upper Arm, Position: Sitting, Cuff Size: Large Adult)   Pulse 79   Wt 272 lb (123.4 kg)   SpO2 95%   BMI 46.69 kg/m²   Alert female no acute distress. Psoriatic plaque right knee    Assessment:    Fibromyalgia         psoriasis      Plan:      Patient will use hydrocortisone on  small lesion on her right knee. I will see the patient back in 1 years time.         Verónica Holly MD, MD, 8/19/2021 11:14 AM

## 2021-08-19 NOTE — TELEPHONE ENCOUNTER
Pt checked out and wanted to be seen in 3 weeks, there was nothing available. There are a few same days available the week after, 9/15/21. Is it okay to use one? If so I will contact patient to schedule. Thank you!

## 2021-08-19 NOTE — PATIENT INSTRUCTIONS
1. If asthma doesn't improve in the next few days, call office for an appointment. 2. Don't use exercise as your reward for getting housework done. Schedule your exercise early in the day. 3. Keep up the good work with your food intake!

## 2021-08-23 ENCOUNTER — VIRTUAL VISIT (OUTPATIENT)
Dept: INTERNAL MEDICINE CLINIC | Age: 61
End: 2021-08-23
Payer: MEDICARE

## 2021-08-23 DIAGNOSIS — J06.9 URTI (ACUTE UPPER RESPIRATORY INFECTION): ICD-10-CM

## 2021-08-23 DIAGNOSIS — J45.901 EXACERBATION OF ASTHMA, UNSPECIFIED ASTHMA SEVERITY, UNSPECIFIED WHETHER PERSISTENT: ICD-10-CM

## 2021-08-23 DIAGNOSIS — J45.909 UNCOMPLICATED ASTHMA, UNSPECIFIED ASTHMA SEVERITY, UNSPECIFIED WHETHER PERSISTENT: Primary | ICD-10-CM

## 2021-08-23 PROCEDURE — G8427 DOCREV CUR MEDS BY ELIG CLIN: HCPCS | Performed by: INTERNAL MEDICINE

## 2021-08-23 PROCEDURE — G8417 CALC BMI ABV UP PARAM F/U: HCPCS | Performed by: INTERNAL MEDICINE

## 2021-08-23 PROCEDURE — 3017F COLORECTAL CA SCREEN DOC REV: CPT | Performed by: INTERNAL MEDICINE

## 2021-08-23 PROCEDURE — 1036F TOBACCO NON-USER: CPT | Performed by: INTERNAL MEDICINE

## 2021-08-23 PROCEDURE — 99214 OFFICE O/P EST MOD 30 MIN: CPT | Performed by: INTERNAL MEDICINE

## 2021-08-23 RX ORDER — PREDNISONE 20 MG/1
60 TABLET ORAL DAILY
Qty: 15 TABLET | Refills: 0 | Status: SHIPPED | OUTPATIENT
Start: 2021-08-23 | End: 2021-08-28

## 2021-08-23 RX ORDER — AZITHROMYCIN 250 MG/1
250 TABLET, FILM COATED ORAL SEE ADMIN INSTRUCTIONS
Qty: 6 TABLET | Refills: 0 | Status: SHIPPED | OUTPATIENT
Start: 2021-08-23 | End: 2021-08-28

## 2021-08-23 ASSESSMENT — ENCOUNTER SYMPTOMS
HEARTBURN: 0
HEMOPTYSIS: 0
WHEEZING: 0
SHORTNESS OF BREATH: 1
RHINORRHEA: 1
DIFFICULTY BREATHING: 1
SPUTUM PRODUCTION: 0
FREQUENT THROAT CLEARING: 0
COUGH: 1
HOARSE VOICE: 0
SORE THROAT: 0
CHEST TIGHTNESS: 0

## 2021-08-23 NOTE — PROGRESS NOTES
Kristofer Sotelo (:  1960) is a 61 y.o. female,Established patient, here for evaluation of the following chief complaint(s): No chief complaint on file. ASSESSMENT/PLAN:  1. Uncomplicated asthma, unspecified asthma severity, unspecified whether persistent  2. Exacerbation of asthma, unspecified asthma severity, unspecified whether persistent  -     predniSONE (DELTASONE) 20 MG tablet; Take 3 tablets by mouth daily for 5 days, Disp-15 tablet, R-0Normal  3. URTI (acute upper respiratory infection)  -     azithromycin (ZITHROMAX) 250 MG tablet; Take 1 tablet by mouth See Admin Instructions for 5 days 500mg on day 1 followed by 250mg on days 2 - 5, Disp-6 tablet, R-0Normal  -     predniSONE (DELTASONE) 20 MG tablet; Take 3 tablets by mouth daily for 5 days, Disp-15 tablet, R-0Normal  -     COVID-19; Future      Return if symptoms worsen or fail to improve.        SUBJECTIVE/OBJECTIVE:  HPI    Review of Systems    Patient-Reported Vitals 2021   Patient-Reported Weight 260 lb   Patient-Reported Height 5'3''   Patient-Reported Systolic 795   Patient-Reported Diastolic 52   Patient-Reported Pulse 85   Patient-Reported Temperature -        Physical Exam    [INSTRUCTIONS:  \"[x]\" Indicates a positive item  \"[]\" Indicates a negative item  -- DELETE ALL ITEMS NOT EXAMINED]    Constitutional: [x] Appears well-developed and well-nourished [x] No apparent distress      [] Abnormal -     Mental status: [x] Alert and awake  [x] Oriented to person/place/time [x] Able to follow commands    [] Abnormal -     Eyes:   EOM    [x]  Normal    [] Abnormal -   Sclera  [x]  Normal    [] Abnormal -          Discharge [x]  None visible   [] Abnormal -     HENT: [x] Normocephalic, atraumatic  [] Abnormal -   [x] Mouth/Throat: Mucous membranes are moist    External Ears [x] Normal  [] Abnormal -    Neck: [x] No visualized mass [] Abnormal -     Pulmonary/Chest: [x] Respiratory effort normal   [x] No visualized signs of difficulty breathing or respiratory distress        [] Abnormal -      Musculoskeletal:   [x] Normal gait with no signs of ataxia         [x] Normal range of motion of neck        [] Abnormal -     Neurological:        [x] No Facial Asymmetry (Cranial nerve 7 motor function) (limited exam due to video visit)          [x] No gaze palsy        [] Abnormal -          Skin:        [x] No significant exanthematous lesions or discoloration noted on facial skin         [] Abnormal -            Psychiatric:       [x] Normal Affect [] Abnormal -        [x] No Hallucinations    Other pertinent observable physical exam findings:-          On this date 8/23/2021 I have spent 25 minutes reviewing previous notes, test results and face to face (virtual) with the patient discussing the diagnosis and importance of compliance with the treatment plan as well as documenting on the day of the visit. Carlos Zuñiga was evaluated through a synchronous (real-time) audio-video encounter. The patient (or guardian if applicable) is aware that this is a billable service. Verbal consent to proceed has been obtained within the past 12 months. The visit was conducted pursuant to the emergency declaration under the [address] [place] [address] authority and the [employer] and e27 General Act. Patient identification was verified, and a caregiver was present when appropriate. The patient was located in a state where the provider was credentialed to provide care. An electronic signature was used to authenticate this note.     --Carter Walker MD

## 2021-08-23 NOTE — PROGRESS NOTES
Carlos Zuñiga (:  1960) is a 61 y.o. female,New patient, here for evaluation of the following chief complaint(s):  No chief complaint on file. ASSESSMENT/PLAN:  1. Uncomplicated asthma, unspecified asthma severity, unspecified whether persistent  2. Exacerbation of asthma, unspecified asthma severity, unspecified whether persistent  -     predniSONE (DELTASONE) 20 MG tablet; Take 3 tablets by mouth daily for 5 days, Disp-15 tablet, R-0Normal  3. URTI (acute upper respiratory infection)  -     azithromycin (ZITHROMAX) 250 MG tablet; Take 1 tablet by mouth See Admin Instructions for 5 days 500mg on day 1 followed by 250mg on days 2 - 5, Disp-6 tablet, R-0Normal  -     predniSONE (DELTASONE) 20 MG tablet; Take 3 tablets by mouth daily for 5 days, Disp-15 tablet, R-0Normal  -     COVID-19; Future    Patient symptomatology is more consistent with an exacerbation of her asthma rather than acute viral infection nonetheless given the prevailing pandemic patient was advised to do a Covid test    For the time being the patient will be given treatment for an upper respiratory tract infection in addition to trying to control her allergies with increasing her nasal spray continuing with the Zyrtec and continue with the Singulair. As far as the asthma is concerned the patient is to continue using her Symbicort which he restarted back about a week to 10 days ago and used albuterol for rescue      Return if symptoms worsen or fail to improve.          Subjective   SUBJECTIVE/OBJECTIVE:    Lab Review   Lab Results   Component Value Date     2021     2021     10/02/2020    K 4.2 2021    K 4.6 2021    K 4.4 10/02/2020    K 4.6 2020    CO2 24 2021    CO2 25 2021    CO2 25 10/02/2020    BUN 16 2021    BUN 15 2021    BUN 13 10/02/2020    CREATININE 0.7 2021    CREATININE 0.8 2021    CREATININE 0.7 10/02/2020    GLUCOSE 115 2021 GLUCOSE 84 05/05/2021    GLUCOSE 104 10/02/2020    CALCIUM 10.1 07/28/2021    CALCIUM 10.1 05/05/2021    CALCIUM 10.1 10/02/2020     Lab Results   Component Value Date    WBC 6.3 07/28/2021    WBC 4.3 10/02/2020    WBC 5.9 06/02/2020    HGB 12.8 07/28/2021    HGB 13.0 10/02/2020    HGB 13.3 06/02/2020    HCT 38.4 07/28/2021    HCT 39.2 10/02/2020    HCT 39.6 06/02/2020    MCV 87.2 07/28/2021    MCV 86.3 10/02/2020    MCV 89.3 06/02/2020     07/28/2021     10/02/2020     06/02/2020     Lab Results   Component Value Date    CHOL 162 10/20/2015    CHOL 220 07/20/2015    CHOL 213 05/04/2015    TRIG 112 10/20/2015    TRIG 129 07/20/2015    TRIG 139 05/04/2015    HDL 65 10/16/2019    HDL 42 10/20/2015    HDL 56 07/20/2015    HDL 56 06/14/2011    HDL 56 11/11/2010    HDL 50 08/10/2010       Vitals 8/19/2021 8/10/2021 9/41/6455   SYSTOLIC 884 023 382   DIASTOLIC 90 89 82   Site Left Upper Arm Left Upper Arm -   Position Sitting Sitting -   Cuff Size Large Adult Large Adult -   Pulse 79 85 -   Temp - - -   Resp - - -   SpO2 95 97 -   Weight 272 lb 274 lb -   Height - 5' 4\" -   Body mass index - 47.03 kg/m2 -   Some recent data might be hidden       Patient symptomatology dates back to about 14 days ago she was seen in the emergency room for that and her labs chest x-ray and EKG was reviewed with her    Patient symptoms have been waxing and waning with some improvement 1 day and then she gets worse the next day she has not been febrile and has not had any chest pain    Cough  This is a new problem. The current episode started in the past 7 days. The problem has been gradually worsening. Associated symptoms include ear congestion, nasal congestion, postnasal drip, rhinorrhea and shortness of breath. Pertinent negatives include no chest pain, chills, ear pain, fever, headaches, heartburn, hemoptysis, sore throat, sweats or wheezing. Her past medical history is significant for asthma.    Asthma  She complains of cough, difficulty breathing and shortness of breath. There is no chest tightness, frequent throat clearing, hemoptysis, hoarse voice, sputum production or wheezing. This is a chronic problem. Associated symptoms include ear congestion, nasal congestion, postnasal drip and rhinorrhea. Pertinent negatives include no chest pain, ear pain, fever, headaches, heartburn, sore throat or sweats. Her past medical history is significant for asthma. Review of Systems   Constitutional: Negative for chills and fever. HENT: Positive for postnasal drip and rhinorrhea. Negative for ear pain, hoarse voice and sore throat. Respiratory: Positive for cough and shortness of breath. Negative for hemoptysis, sputum production and wheezing. Cardiovascular: Negative for chest pain. Gastrointestinal: Negative for heartburn. Neurological: Negative for headaches. Objective   Physical Exam       This dictation was generated by voice recognition computer software. Although all attempts are made to edit the dictation for accuracy, there may be errors in the transcription that are not intended. An electronic signature was used to authenticate this note.     --Octavio Ramirez MD

## 2021-08-27 ENCOUNTER — TELEPHONE (OUTPATIENT)
Dept: CARDIOLOGY CLINIC | Age: 61
End: 2021-08-27

## 2021-09-20 ENCOUNTER — OFFICE VISIT (OUTPATIENT)
Dept: PSYCHOLOGY | Age: 61
End: 2021-09-20
Payer: MEDICARE

## 2021-09-20 DIAGNOSIS — F43.21 ADJUSTMENT DISORDER WITH DEPRESSED MOOD: Primary | ICD-10-CM

## 2021-09-20 PROCEDURE — 1036F TOBACCO NON-USER: CPT | Performed by: PSYCHOLOGIST

## 2021-09-20 PROCEDURE — 90832 PSYTX W PT 30 MINUTES: CPT | Performed by: PSYCHOLOGIST

## 2021-09-20 NOTE — PROGRESS NOTES
Never Smoker    Smokeless tobacco: Never Used   Vaping Use    Vaping Use: Never used   Substance and Sexual Activity    Alcohol use: Yes     Comment: 2 drinks per month    Drug use: No    Sexual activity: Not on file   Other Topics Concern    Not on file   Social History Narrative    Not on file     Social Determinants of Health     Financial Resource Strain:     Difficulty of Paying Living Expenses:    Food Insecurity:     Worried About Running Out of Food in the Last Year:     [address] in the Last Year:    Transportation Needs:     Lack of Transportation (Medical):  Lack of Transportation (Non-Medical):    Physical Activity:     Days of Exercise per Week:     Minutes of Exercise per Session:    Stress:     Feeling of Stress :    Social Connections:     Frequency of Communication with Friends and Family:     Frequency of Social Gatherings with Friends and Family:     Attends [employer] Services:     Active Member of Clubs or Organizations:     Attends Club or Organization Meetings:     Marital Status:    Intimate Partner Violence:     Fear of Current or Ex-Partner:     Emotionally Abused:     Physically Abused:     Sexually Abused:      TOBACCO:   reports that she has never smoked. She has never used smokeless tobacco.  ETOH:   reports current alcohol use. Diagnosis:  1. Adjustment disorder with depressed mood          Plan:  Pt interventions:  Discussed and problem-solved barriers in adhering to behavioral change plan, Motivational Interviewing to increase patient confidence and compliance with adhering to behavioral change plan and Provided Psychoeducation re: metabolic balance        Documentation was done using voice recognition dragon software. Every effort was made to ensure accuracy; however, inadvertent, unintentional computerized transcription errors may be present.

## 2021-09-22 ENCOUNTER — TELEPHONE (OUTPATIENT)
Dept: CARDIOLOGY CLINIC | Age: 61
End: 2021-09-22

## 2021-09-22 NOTE — TELEPHONE ENCOUNTER
Medication Refill    Medication needing refilled: xarelto    Dosage of the medication:    How are you taking this medication (QD, BID, TID, QID, PRN):    30 or 90 day supply called in:    When will you run out of your medication:  WANTS TO PICK THIS UP THIS AFTERNOON.     Which Pharmacy are we sending the medication to?:   [facility] [place] [facility]

## 2021-10-04 DIAGNOSIS — J30.89 SEASONAL ALLERGIC RHINITIS DUE TO OTHER ALLERGIC TRIGGER: ICD-10-CM

## 2021-10-05 RX ORDER — MONTELUKAST SODIUM 10 MG/1
TABLET ORAL
Qty: 90 TABLET | Refills: 1 | Status: SHIPPED | OUTPATIENT
Start: 2021-10-05 | End: 2022-05-24 | Stop reason: SDUPTHER

## 2021-10-18 ENCOUNTER — TELEPHONE (OUTPATIENT)
Dept: INTERNAL MEDICINE CLINIC | Age: 61
End: 2021-10-18

## 2021-10-18 NOTE — TELEPHONE ENCOUNTER
----- Message from Uli Oseguera sent at 10/18/2021  8:59 AM EDT -----  Subject: Message to Provider    QUESTIONS  Information for Provider? patient is calling to reschedule appointment   with Dr. Marisol Poe on 10-20-21. Please call David Riddle  ---------------------------------------------------------------------------  --------------  Ann RAHMAN  What is the best way for the office to contact you? OK to leave message on   voicemail  Preferred Call Back Phone Number? [phone]  ---------------------------------------------------------------------------  --------------  SCRIPT ANSWERS  Relationship to Patient?  Self

## 2021-11-02 RX ORDER — CYCLOBENZAPRINE HCL 10 MG
TABLET ORAL
Qty: 90 TABLET | Refills: 0 | Status: SHIPPED | OUTPATIENT
Start: 2021-11-02 | End: 2021-12-10

## 2021-11-10 ENCOUNTER — HOSPITAL ENCOUNTER (OUTPATIENT)
Dept: WOMENS IMAGING | Age: 61
Discharge: HOME | End: 2021-11-10
Payer: MEDICARE

## 2021-11-10 VITALS — WEIGHT: 256 LBS | BODY MASS INDEX: 43.71 KG/M2 | HEIGHT: 64 IN

## 2021-11-10 DIAGNOSIS — Z12.31 ENCOUNTER FOR SCREENING MAMMOGRAM FOR MALIGNANT NEOPLASM OF BREAST: ICD-10-CM

## 2021-11-10 PROCEDURE — 77063 BREAST TOMOSYNTHESIS BI: CPT

## 2021-12-09 DIAGNOSIS — E55.9 VITAMIN D DEFICIENCY: ICD-10-CM

## 2021-12-10 ENCOUNTER — OFFICE VISIT (OUTPATIENT)
Dept: INTERNAL MEDICINE CLINIC | Age: 61
End: 2021-12-10
Payer: MEDICARE

## 2021-12-10 VITALS
SYSTOLIC BLOOD PRESSURE: 132 MMHG | HEART RATE: 74 BPM | OXYGEN SATURATION: 98 % | DIASTOLIC BLOOD PRESSURE: 80 MMHG | WEIGHT: 258.4 LBS | BODY MASS INDEX: 44.35 KG/M2

## 2021-12-10 DIAGNOSIS — I48.0 PAF (PAROXYSMAL ATRIAL FIBRILLATION) (HCC): ICD-10-CM

## 2021-12-10 DIAGNOSIS — I10 ESSENTIAL HYPERTENSION: ICD-10-CM

## 2021-12-10 DIAGNOSIS — M79.7 FIBROMYALGIA: ICD-10-CM

## 2021-12-10 DIAGNOSIS — E78.49 OTHER HYPERLIPIDEMIA: ICD-10-CM

## 2021-12-10 DIAGNOSIS — E03.8 OTHER SPECIFIED HYPOTHYROIDISM: ICD-10-CM

## 2021-12-10 DIAGNOSIS — J45.909 UNCOMPLICATED ASTHMA, UNSPECIFIED ASTHMA SEVERITY, UNSPECIFIED WHETHER PERSISTENT: ICD-10-CM

## 2021-12-10 DIAGNOSIS — Z63.79 STRESSFUL LIFE EVENT AFFECTING FAMILY: ICD-10-CM

## 2021-12-10 DIAGNOSIS — G47.39 OTHER SLEEP APNEA: ICD-10-CM

## 2021-12-10 DIAGNOSIS — J30.89 SEASONAL ALLERGIC RHINITIS DUE TO OTHER ALLERGIC TRIGGER: ICD-10-CM

## 2021-12-10 DIAGNOSIS — Z01.419 WELL WOMAN EXAM: Primary | ICD-10-CM

## 2021-12-10 DIAGNOSIS — R73.03 PREDIABETES: ICD-10-CM

## 2021-12-10 PROCEDURE — G8484 FLU IMMUNIZE NO ADMIN: HCPCS | Performed by: NURSE PRACTITIONER

## 2021-12-10 PROCEDURE — 99396 PREV VISIT EST AGE 40-64: CPT | Performed by: NURSE PRACTITIONER

## 2021-12-10 RX ORDER — NALTREXONE HYDROCHLORIDE 50 MG/1
50 TABLET, FILM COATED ORAL DAILY
COMMUNITY
Start: 2021-10-29

## 2021-12-10 RX ORDER — LEVOTHYROXINE SODIUM 112 UG/1
TABLET ORAL
Qty: 90 TABLET | Refills: 0 | Status: SHIPPED | OUTPATIENT
Start: 2021-12-10 | End: 2022-05-24 | Stop reason: SDUPTHER

## 2021-12-10 RX ORDER — MONTELUKAST SODIUM 4 MG/1
1 TABLET, CHEWABLE ORAL 2 TIMES DAILY
Qty: 180 TABLET | Refills: 1 | Status: SHIPPED | OUTPATIENT
Start: 2021-12-10 | End: 2022-01-25 | Stop reason: SDUPTHER

## 2021-12-10 RX ORDER — DULOXETIN HYDROCHLORIDE 60 MG/1
60 CAPSULE, DELAYED RELEASE ORAL DAILY
Qty: 90 CAPSULE | Refills: 1 | Status: SHIPPED | OUTPATIENT
Start: 2021-12-10 | End: 2022-01-25 | Stop reason: SDUPTHER

## 2021-12-10 RX ORDER — CETIRIZINE HYDROCHLORIDE 10 MG/1
10 TABLET ORAL DAILY
Qty: 90 TABLET | Refills: 0 | Status: CANCELLED | OUTPATIENT
Start: 2021-12-10

## 2021-12-10 RX ORDER — BUPROPION HYDROCHLORIDE 150 MG/1
1 TABLET, EXTENDED RELEASE ORAL 2 TIMES DAILY
COMMUNITY
Start: 2021-11-17

## 2021-12-10 ASSESSMENT — ENCOUNTER SYMPTOMS
VOMITING: 0
SHORTNESS OF BREATH: 0
CONSTIPATION: 0
DIARRHEA: 0
SINUS PAIN: 0
SORE THROAT: 0
WHEEZING: 0
COUGH: 0
ABDOMINAL PAIN: 0
NAUSEA: 0

## 2021-12-10 NOTE — PROGRESS NOTES
Jaxon Dunn  YOB: 1960    Date of Service:  12/10/2021    Chief Complaint:   Jaxon Dunn is a 64 y.o. female who presents for complete physical examination. Chief Complaint   Patient presents with    Gynecologic Exam     HPI: The pt is here today for a well woman exam with pap. She denies GYN problems today. No abnormal vaginal bleeding or discharge. No abdominal cramping. No urinary problems. No N/V/D/C. No abdominal pains. HTN/afib- seeing cardiology. Had an ablation. BP at home has been running 121/74  Asymptomatic. Denies chest pain, palpitations, shortness of breath, trouble breathing, lightheadedness, dizziness or blurred vision. Hyperlipidemia Takes colestipol 1 gram BID. States she tried 3-4 statins and \"they made me crazy. \" Not interested in stains.      Mood Pt takes cymbalta 60 mg daily. Denies side effects. She states her mood is doing \"fine. \" Has a good support system. Denies SI/HI. Not seeing a psychologist.       Prediabetes-  Exercising \"intermittently\". Diet is reported as \"it's ok. \" She thinks this is healthy overall. Seeing weight management.     Sleep apnea- Uses CPAP with relief.     Allergic rhinitis Taking singulair and zyrtec with relief. Denies side effects.     Asthma- uses symbicort daily (in spring and fall) and albuterol PRN. Has not needed either inhaler since last visit. Has the inhalers if she needs it. Denies trouble breathing/SOB/wheezing.     Fibromyalgia- Prescribed Cymbalta and gabapentin 300 mg TID and vit D3. Tried lyrica with side effects. Chronic joint pains. Follows with rheumatology. Seeing [facility] for back pains- performing PT.      Hypothyroidism- Taking synthroid 112 mcg for years. Asymptomatic.  No side effects.     Wt Readings from Last 3 Encounters:   12/10/21 258 lb 6.4 oz (117.2 kg)   11/10/21 256 lb (116.1 kg)   08/19/21 272 lb (123.4 kg)     BP Readings from Last 3 Encounters:   12/10/21 132/80   08/19/21 (!) 132/90   08/10/21 (!) 153/89     Patient Active Problem List   Diagnosis    Hypertension    Hyperlipidemia    Back pain    Sleep apnea    Urinary incontinence    Class 3 severe obesity due to excess calories without serious comorbidity with body mass index (BMI) of 45.0 to 49.9 in adult [facility])    Peripheral polyneuropathy    Neurogenic claudication due to lumbar spinal stenosis    Osteoarthritis of spine with radiculopathy, lumbar region    Balance problem    Skin lesion of left leg    Intractable chronic migraine without aura and with status migrainosus    Prediabetes    Depression    Asthma    Fibromyalgia    PAF (paroxysmal atrial fibrillation) ([facility] [address]    Other specified hypothyroidism     Preventive Care:  Health Maintenance   Topic Date Due    Annual Wellness Visit (AWV)  11/13/2021    A1C test (Diabetic or Prediabetic)  05/05/2022    TSH testing  05/05/2022    DTaP/Tdap/Td vaccine (2 - Td or Tdap) 10/01/2023    Breast cancer screen  11/10/2023    Lipid screen  10/16/2024    Pneumococcal 0-64 years Vaccine (2 of 2 - PPSV23) 09/06/2025    Colon cancer screen colonoscopy  11/25/2027    Flu vaccine  Completed    Shingles Vaccine  Completed    COVID-19 Vaccine  Completed    Hepatitis C screen  Completed    HIV screen  Completed    Hepatitis A vaccine  Aged Out    Hepatitis B vaccine  Aged Out    Hib vaccine  Aged Out    Meningococcal (ACWY) vaccine  Aged Out        Patient's last menstrual period was No LMP recorded. Patient has had a hysterectomy.   Last Pap: 5 years ago  Results: normal   Colposcopy?: No  Breast problems?: Denies  Last Mammogram: 11/21  Results: stable    Sexual activity:  yes  Number of partners in the past year:1  Contraception: surgical  STD history: No  Symptoms of burning/discharge/pain: No  Screening for STDs: No    Social History     Socioeconomic History    Marital status:      Spouse name: Not on file    Number of children: Not on file    Years of education: Not on file    Highest education level: Not on file   Occupational History    Not on file   Tobacco Use    Smoking status: Never Smoker    Smokeless tobacco: Never Used   Vaping Use    Vaping Use: Never used   Substance and Sexual Activity    Alcohol use: Yes     Comment: 2 drinks per month    Drug use: No    Sexual activity: Not on file   Other Topics Concern    Not on file   Social History Narrative    Not on file     Social Determinants of Health     Financial Resource Strain:     Difficulty of Paying Living Expenses: Not on file   Food Insecurity:     Worried About Running Out of Food in the Last Year: Not on file    Deepali of Food in the Last Year: Not on file   Transportation Needs:     Lack of Transportation (Medical): Not on file    Lack of Transportation (Non-Medical): Not on file   Physical Activity:     Days of Exercise per Week: Not on file    Minutes of Exercise per Session: Not on file   Stress:     Feeling of Stress : Not on file   Social Connections:     Frequency of Communication with Friends and Family: Not on file    Frequency of Social Gatherings with Friends and Family: Not on file    Attends [employer] Services: Not on file    Active Member of [address] [place] [address] or Organizations: Not on file    Attends Club or Organization Meetings: Not on file    Marital Status: Not on file   Intimate Partner Violence:     Fear of Current or Ex-Partner: Not on file    Emotionally Abused: Not on file    Physically Abused: Not on file    Sexually Abused: Not on file   Housing Stability:     Unable to Pay for Housing in the Last Year: Not on file    Number of [place] in the Last Year: Not on file    Unstable Housing in the Last Year: Not on file      Self-breast exams: no  Previous DEXA scan: no  Last eye exam: 2020, normal per pt  Seatbelt use: Yes  Feels safe in home and in relationships.   Lipid panel:   Lab Results   Component Value Date    CHOL 162 10/20/2015    TRIG 112 10/20/2015    HDL 65 14 days 6 capsule 1    acetaminophen (TYLENOL 8 HOUR) 650 MG extended release tablet Take 650 mg by mouth every 8 hours as needed for Pain       Past Medical History:   Diagnosis Date    A-fib ([address]     Anxiety     Asthma     Back pain     Depression     Fibromyalgia     Hyperlipidemia     Hypertension     Migraines     Other specified hypothyroidism 5/5/2021    Prediabetes     Unspecified sleep apnea     Urinary incontinence      Past Surgical History:   Procedure Laterality Date    BREAST REDUCTION SURGERY      COLONOSCOPY N/A 11/27/2019    COLONOSCOPY POLYPECTOMY SNARE/COLD BIOPSY performed by Arjun Bettencourt MD at [address] Left 2017    TONSILLECTOMY  1968    TUBAL LIGATION       Family History   Problem Relation Age of Onset    Cancer Other     Cancer Other     Cancer Maternal Uncle         melanoma    Arthritis Mother     Diabetes Mother     Heart Disease Mother         a-fib    COPD Mother     Cancer Mother         skin    Ovarian Cancer Mother     Stroke Mother     COPD Father     Arthritis Father     Cancer Father         lung, prostate    COPD Brother     Stroke Brother     Glaucoma Maternal Grandmother     Breast Cancer Maternal Grandmother         breast    Heart Attack Neg Hx      Review of Systems:  Review of Systems   Constitutional: Negative for chills, fatigue and fever. HENT: Negative for congestion, postnasal drip, sinus pain and sore throat. Respiratory: Negative for cough, shortness of breath and wheezing. Cardiovascular: Negative for chest pain, palpitations and leg swelling. Gastrointestinal: Negative for abdominal pain, constipation, diarrhea, nausea and vomiting. Genitourinary: Negative for dysuria, frequency, hematuria and urgency. Musculoskeletal: Negative for myalgias and neck pain. Skin: Negative for pallor and rash.    Neurological: Negative for dizziness, weakness, light-headedness, numbness and headaches. Psychiatric/Behavioral: Negative for dysphoric mood, sleep disturbance and suicidal ideas. The patient is not nervous/anxious. Physical Exam:   Vitals:    12/10/21 1500   BP: 132/80   Pulse: 74   SpO2: 98%   Weight: 258 lb 6.4 oz (117.2 kg)     Body mass index is 44.35 kg/m². Physical Exam  Vitals reviewed. Constitutional:       General: She is not in acute distress. Appearance: She is well-developed. She is not diaphoretic. HENT:      Head: Normocephalic and atraumatic. Right Ear: Tympanic membrane and external ear normal.      Left Ear: Tympanic membrane and external ear normal.   Eyes:      Conjunctiva/sclera: Conjunctivae normal.      Pupils: Pupils are equal, round, and reactive to light. Cardiovascular:      Rate and Rhythm: Normal rate and regular rhythm. Pulmonary:      Effort: Pulmonary effort is normal. No respiratory distress. Breath sounds: Normal breath sounds. No wheezing or rales. Chest:      Chest wall: No tenderness. Abdominal:      General: Bowel sounds are normal. There is no distension. Palpations: Abdomen is soft. Tenderness: There is no abdominal tenderness. There is no guarding. Skin:     General: Skin is warm and dry. Neurological:      Mental Status: She is alert and oriented to person, place, and time. Coordination: Coordination normal.   Psychiatric:         Mood and Affect: Mood normal.     Genitourinary: normal external genitalia, vulva and vagina. Exam limited by pt very tense and clenching pelvic muscles/legs throughout the exam-Speculum was pushed out multiple times. Unable to visualize the cervix itself. Dry vaginal area. Pt tightened her legs and pelvic muscles quickly urinated  Breast exam: A clinical breast exam during the visit today was offered to the patient. The patient was educated on breast health awareness. The risks versus the benefits were reviewed.   I reviewed the new recommendations that these were not required. The patient declined a breast exam today. The patient denies breast problems at this time. A chaperone (Enedina Red MA) was present the entire pelvic exam.    Assessment/Plan:  Guillermo Joe was seen today for gynecologic exam.  Diagnoses and all orders for this visit:    Well woman exam  -     PAP SMEAR    Essential hypertension/PAF (paroxysmal atrial fibrillation) (HCC)   - BP stable today. Reported as running to goal at home. - Continue with cardiology. Other hyperlipidemia  -    Taking without side effects. Continue current regimen  - Labs with AWV next week. - colestipol (COLESTID) 1 g tablet; Take 1 tablet by mouth 2 times daily    Stressful life event affecting family/Fibromyalgia   - States her mood is doing well on the current regimen   -     DULoxetine (CYMBALTA) 60 MG extended release capsule; Take 1 capsule by mouth daily    Prediabetes   - Lifestyle modifications such as exercise, weight loss and healthy diet encouraged and reviewed with the pt. Other sleep apnea   - Continue with CPAP    Seasonal allergic rhinitis due to other allergic trigger   - Well controlled without side effects. Continue current regimen    Uncomplicated asthma, unspecified asthma severity, unspecified whether persistent   - Well controlled at this time. Asymptomatic. Other specified hypothyroidism  -    TSH stable 5/21. Asymptomatic. Denies side effects.   - Continue current regimen  - levothyroxine (SYNTHROID) 112 MCG tablet; TAKE 1 TABLET EVERY DAY    Return in about 1 week (around 12/17/2021) for AWV, or sooner if needed. All questions answered. Patient states no further questions or concerns at this time.   Electronically signed by: JOSE Lopez - CNP 12/10/21

## 2021-12-13 RX ORDER — ERGOCALCIFEROL 1.25 MG/1
50000 CAPSULE ORAL
Qty: 6 CAPSULE | Refills: 5 | Status: SHIPPED | OUTPATIENT
Start: 2021-12-13 | End: 2022-07-27

## 2021-12-15 ENCOUNTER — OFFICE VISIT (OUTPATIENT)
Dept: INTERNAL MEDICINE CLINIC | Age: 61
End: 2021-12-15
Payer: MEDICARE

## 2021-12-15 VITALS
OXYGEN SATURATION: 97 % | SYSTOLIC BLOOD PRESSURE: 136 MMHG | BODY MASS INDEX: 44.25 KG/M2 | DIASTOLIC BLOOD PRESSURE: 84 MMHG | HEIGHT: 64 IN | WEIGHT: 259.2 LBS | HEART RATE: 72 BPM

## 2021-12-15 DIAGNOSIS — R73.03 PREDIABETES: ICD-10-CM

## 2021-12-15 DIAGNOSIS — R53.83 OTHER FATIGUE: ICD-10-CM

## 2021-12-15 DIAGNOSIS — E03.8 OTHER SPECIFIED HYPOTHYROIDISM: ICD-10-CM

## 2021-12-15 DIAGNOSIS — Z00.00 ROUTINE GENERAL MEDICAL EXAMINATION AT A HEALTH CARE FACILITY: Primary | ICD-10-CM

## 2021-12-15 DIAGNOSIS — E78.49 OTHER HYPERLIPIDEMIA: ICD-10-CM

## 2021-12-15 LAB
HPV COMMENT: NORMAL
HPV TYPE 16: NOT DETECTED
HPV TYPE 18: NOT DETECTED
HPVOH (OTHER TYPES): NOT DETECTED

## 2021-12-15 PROCEDURE — G0439 PPPS, SUBSEQ VISIT: HCPCS | Performed by: NURSE PRACTITIONER

## 2021-12-15 PROCEDURE — G8484 FLU IMMUNIZE NO ADMIN: HCPCS | Performed by: NURSE PRACTITIONER

## 2021-12-15 PROCEDURE — 3017F COLORECTAL CA SCREEN DOC REV: CPT | Performed by: NURSE PRACTITIONER

## 2021-12-15 ASSESSMENT — LIFESTYLE VARIABLES: HOW OFTEN DO YOU HAVE A DRINK CONTAINING ALCOHOL: 0

## 2021-12-15 ASSESSMENT — PATIENT HEALTH QUESTIONNAIRE - PHQ9
1. LITTLE INTEREST OR PLEASURE IN DOING THINGS: 0
SUM OF ALL RESPONSES TO PHQ QUESTIONS 1-9: 0
SUM OF ALL RESPONSES TO PHQ QUESTIONS 1-9: 0
2. FEELING DOWN, DEPRESSED OR HOPELESS: 0
SUM OF ALL RESPONSES TO PHQ QUESTIONS 1-9: 0
SUM OF ALL RESPONSES TO PHQ9 QUESTIONS 1 & 2: 0

## 2021-12-15 NOTE — PATIENT INSTRUCTIONS
Please get your fasting lab work (no food or drink for 10-12 hours prior besides water) completed M-F [id number] at our office. [facility] lab has walk-in hours available as well - they are open Saturday 7a-3p - no appointment is needed. We will call with your results. Personalized Preventive Plan for Matt Perez - 12/15/2021  Medicare offers a range of preventive health benefits. Some of the tests and screenings are paid in full while other may be subject to a deductible, co-insurance, and/or copay. Some of these benefits include a comprehensive review of your medical history including lifestyle, illnesses that may run in your family, and various assessments and screenings as appropriate. After reviewing your medical record and screening and assessments performed today your provider may have ordered immunizations, labs, imaging, and/or referrals for you. A list of these orders (if applicable) as well as your Preventive Care list are included within your After Visit Summary for your review. Other Preventive Recommendations:    · A preventive eye exam performed by an eye specialist is recommended every 1-2 years to screen for glaucoma; cataracts, macular degeneration, and other eye disorders. · A preventive dental visit is recommended every 6 months. · Try to get at least 150 minutes of exercise per week or 10,000 steps per day on a pedometer . · Order or download the FREE \"Exercise & Physical Activity: Your Everyday Guide\" from [employer]. Call 1-982.435.4003 or search [employer] online. · You need 2573-1676 mg of calcium and 8233-4803 IU of vitamin D per day. It is possible to meet your calcium requirement with diet alone, but a vitamin D supplement is usually necessary to meet this goal.  · When exposed to the sun, use a sunscreen that protects against both UVA and UVB radiation with an SPF of 30 or greater.  Reapply every 2 to 3 hours or after sweating, drying off with a towel, or swimming. · Always wear a seat belt when traveling in a car. Always wear a helmet when riding a bicycle or motorcycle.

## 2021-12-15 NOTE — PROGRESS NOTES
Medicare Annual Wellness Visit  Name: Sha Gonsalves Date: 12/15/2021   MRN: [id number] Sex: Female   Age: 64 y.o. Ethnicity: Non- / Non    : 1960 Race: White (non-)      Laura Alcala is here for Medicare AWV    Screenings for behavioral, psychosocial and functional/safety risks, and cognitive dysfunction are all negative except as indicated below. These results, as well as other patient data [facility] [address] [facility], are documented in Flowsheets linked to this Encounter. Retired- on disability. Enjoys swimming and singing. . No children. Degree in elementary and  education. Allergies   Allergen Reactions    Latex Rash    Prednisone      Other reaction(s): Per patient-blow up    Statins Nausea Only         Prior to Visit Medications    Medication Sig Taking?  Authorizing Provider   vitamin D (ERGOCALCIFEROL) 1.25 MG (07164 UT) CAPS capsule TAKE 1 CAPSULE BY MOUTH EVERY 14 DAYS Yes Nghia Morelos MD   naltrexone (DEPADE) 50 MG tablet Take 50 mg by mouth daily Yes Historical Provider, MD   buPROPion (WELLBUTRIN SR) 150 MG extended release tablet Take 1 tablet by mouth 2 times daily Yes Historical Provider, MD   DULoxetine (CYMBALTA) 60 MG extended release capsule Take 1 capsule by mouth daily Yes JOSE Vaughn CNP   colestipol (COLESTID) 1 g tablet Take 1 tablet by mouth 2 times daily Yes JOSE Vaughn CNP   levothyroxine (SYNTHROID) 112 MCG tablet TAKE 1 TABLET EVERY DAY Yes JOSE Vaughn CNP   montelukast (SINGULAIR) 10 MG tablet TAKE 1 TABLET EVERY NIGHT Yes JOSE Vaughn CNP   rivaroxaban (XARELTO) 20 MG TABS tablet Take 1 tablet by mouth Daily with supper Yes Minor Habermann, APRN - CNP   Ascorbic Acid (SWEETIE-C PO) Take 1 tablet by mouth Yes Historical Provider, MD   cetirizine (ZYRTEC) 10 MG tablet Take 1 tablet by mouth daily Yes JOSE Vaughn CNP zinc gluconate 50 MG tablet Take 50 mg by mouth daily Yes Historical Provider, MD   magnesium 200 MG TABS tablet Take 200 mg by mouth daily Yes Historical Provider, MD   acetaminophen (TYLENOL 8 HOUR) 650 MG extended release tablet Take 650 mg by mouth every 8 hours as needed for Pain Yes Historical Provider, MD         Past Medical History:   Diagnosis Date    A-fib ([facility] [address]     Anxiety     Asthma     Back pain     Depression     Fibromyalgia     Hyperlipidemia     Hypertension     Migraines     Other specified hypothyroidism 05/05/2021    PKU (phenylketonuria) ([facility]     Prediabetes     Unspecified sleep apnea     Urinary incontinence      Past Surgical History:   Procedure Laterality Date    BREAST REDUCTION SURGERY      COLONOSCOPY N/A 11/27/2019    COLONOSCOPY POLYPECTOMY SNARE/COLD BIOPSY performed by Mazin Wright MD at [address] Left 2017    SHOULDER ARTHROPLASTY Right     2019    TONSILLECTOMY  1968    TUBAL LIGATION           Family History   Problem Relation Age of Onset    Cancer Other     Cancer Other     Cancer Maternal Uncle         melanoma    Arthritis Mother     Diabetes Mother     Heart Disease Mother         a-fib    COPD Mother     Cancer Mother         skin    Ovarian Cancer Mother     Stroke Mother     COPD Father     Arthritis Father     Cancer Father         lung, prostate    COPD Brother     Stroke Brother     Glaucoma Maternal Grandmother     Breast Cancer Maternal Grandmother         breast    Heart Attack Neg Hx      CareTeam (Including outside providers/suppliers regularly involved in providing care):   Patient Care Team:  JOSE Blanco CNP as PCP - General (Nurse Practitioner)  JOSE Blanco CNP as PCP - REHABILITATION [facility] Empaneled Provider    Wt Readings from Last 3 Encounters:   12/15/21 259 lb 3.2 oz (117.6 kg)   12/10/21 258 lb 6.4 oz (117.2 kg)   11/10/21 256 lb (116.1 kg)     Vitals:    12/15/21 1116   BP: 136/84   Pulse: 72   SpO2: 97%   Weight: 259 lb 3.2 oz (117.6 kg)   Height: 5' 4\" (1.626 m)     Body mass index is 44.49 kg/m². Based upon direct observation of the patient, evaluation of cognition reveals recent and remote memory intact. Physical Exam  Constitutional:       General: She is not in acute distress. Appearance: Normal appearance. She is not ill-appearing. Pulmonary:      Effort: Pulmonary effort is normal. No respiratory distress. Skin:     Coloration: Skin is not jaundiced or pale. Neurological:      Mental Status: She is alert. Psychiatric:         Mood and Affect: Mood normal.     Patient's complete Health Risk Assessment and screening values have been reviewed and are found in Flowsheets. The following problems were reviewed today and where indicated follow up appointments were made and/or referrals ordered. Positive Risk Factor Screenings with Interventions:     Fall Risk:  2 or more falls in past year?: (!) yes  Fall with injury in past year?: no  Fall Risk Interventions:    · Home safety tips provided  · using cane PRN. · Offered PT- pt declines. General Health and ACP:  General  In general, how would you say your health is?: Good  In the past 7 days, have you experienced any of the following? New or Increased Pain, New or Increased Fatigue, Loneliness, Social Isolation, Stress or Anger?: (!) New or Increased Fatigue  Do you get the social and emotional support that you need?: Yes  Do you have a Living Will?: Yes  Advance Directives     Power of  Living Will ACP-Advance Directive ACP-Power of     Not on File Not on File Not on File Not on File      General Health Risk Interventions:  · Fatigue: reports this is chronic, not new.  Labs ordered    Health Habits/Nutrition:  Health Habits/Nutrition  Do you exercise for at least 20 minutes 2-3 times per week?: Yes  Have you lost any weight without ([id number]) 11/25/2015    Tdap (Boostrix, Adacel) 10/01/2013    Zoster Recombinant (Shingrix) 10/11/2020, 01/06/2021     Health Maintenance   Topic Date Due    Annual Wellness Visit (AWV)  11/13/2021    A1C test (Diabetic or Prediabetic)  05/05/2022    TSH testing  05/05/2022    DTaP/Tdap/Td vaccine (2 - Td or Tdap) 10/01/2023    Breast cancer screen  11/10/2023    Lipid screen  10/16/2024    Pneumococcal 0-64 years Vaccine (2 of 2 - PPSV23) 09/06/2025    Colon cancer screen colonoscopy  11/25/2027    Flu vaccine  Completed    Shingles Vaccine  Completed    COVID-19 Vaccine  Completed    Hepatitis C screen  Completed    HIV screen  Completed    Hepatitis A vaccine  Aged Out    Hepatitis B vaccine  Aged Out    Hib vaccine  Aged Out    Meningococcal (ACWY) vaccine  Aged Out     Recommendations for [employer]-Stand Due: see orders and patient instructions/AVS.    Recommended screening schedule for the next 5-10 years is provided to the patient in written form: see Patient Graciela Shaw was seen today for medicare awv. Diagnoses and all orders for this visit:    Routine general medical examination at a health care facility  -     BASIC METABOLIC PANEL; Future    Other hyperlipidemia  -    Will re-evaluate. - Lipid, Fasting; Future    Prediabetes  -    Lifestyle modifications such as exercise, weight loss and healthy diet encouraged and reviewed with the pt. - Last few years of A1C reviewed with the pt per her request.   - Will re-evaluate.   - HEMOGLOBIN A1C; Future    Other specified hypothyroidism  -    Will re-evaluate TSH   - TSH without Reflex; Future    Other fatigue  -    Chronic fatigue per pt. - Pt reports: \"I feel really good right now. I feel like my medication is on track. I feel as good as I have felt in a long time. \"  - Lifestyle modifications such as exercise, weight loss and healthy diet encouraged and reviewed with the pt. - BASIC METABOLIC PANEL;  Future  - TSH without Reflex; Future  -     HEMOGLOBIN A1C; Future  -     CBC Auto Differential; Future  - Pt will call if symptoms worsen or fail to improve  - Red flag warning signs reviewed with the pt and she will go to the ER if these occur. Return for Medicare Annual Wellness Visit in 1 year. All questions answered. Patient states no further questions or concerns at this time.    Electronically signed by: JOSE Hoyos CNP 12/15/21

## 2022-01-11 ENCOUNTER — TELEPHONE (OUTPATIENT)
Dept: INTERNAL MEDICINE CLINIC | Age: 62
End: 2022-01-11

## 2022-01-11 NOTE — TELEPHONE ENCOUNTER
Pt calling,  covid positive on 1/10. States her symptoms started over the weekend, 1/8. Symptoms include headache, body aches, congestion, chest pain and left arm pain. Radha's out of the office for the afternoon, spoke with Dr. Dutch Leung and due to chest & left arm pain pt was advised to go to the ER. Pt informed.

## 2022-01-17 RX ORDER — CYCLOBENZAPRINE HCL 10 MG
TABLET ORAL
Qty: 90 TABLET | Refills: 0 | Status: SHIPPED | OUTPATIENT
Start: 2022-01-17 | End: 2022-07-27

## 2022-01-25 ENCOUNTER — TELEPHONE (OUTPATIENT)
Dept: INTERNAL MEDICINE CLINIC | Age: 62
End: 2022-01-25

## 2022-01-25 NOTE — TELEPHONE ENCOUNTER
----- Message from Estefanía Saenz sent at 1/24/2022  2:22 PM EST -----  Subject: Message to Provider    QUESTIONS  Information for Provider? :[phone] [facility]   ---------------------------------------------------------------------------  --------------  Felisha RAHMAN  What is the best way for the office to contact you? OK to leave message on   voicemail  Preferred Call Back Phone Number? [phone]  ---------------------------------------------------------------------------  --------------  SCRIPT ANSWERS  Relationship to Patient?  Self

## 2022-01-26 ENCOUNTER — TELEPHONE (OUTPATIENT)
Dept: INTERNAL MEDICINE CLINIC | Age: 62
End: 2022-01-26

## 2022-01-26 DIAGNOSIS — E78.49 OTHER HYPERLIPIDEMIA: ICD-10-CM

## 2022-01-26 RX ORDER — MONTELUKAST SODIUM 4 MG/1
1 TABLET, CHEWABLE ORAL 2 TIMES DAILY
Qty: 20 TABLET | Refills: 0 | Status: SHIPPED | OUTPATIENT
Start: 2022-01-26 | End: 2022-02-28

## 2022-01-26 NOTE — TELEPHONE ENCOUNTER
I believe that I sent this medication at her visit. OK if pharmacy needs to give a partial fill if cheaper for pt.

## 2022-01-26 NOTE — TELEPHONE ENCOUNTER
----- Message from Arnoldo Boyd sent at 1/26/2022 12:07 PM EST -----  Subject: Message to Provider    QUESTIONS  Information for Provider? Pt called in and stated Pharmacy states will not   have medication for a well and want to see if provider can send ten days   to cover pt until medication is able to be sent to pt . Medication   colestipol (COLESTID) 1 g tablet [facility] phone number -   [phone]  ---------------------------------------------------------------------------  --------------  [address]  What is the best way for the office to contact you? OK to leave message on   voicemail  Preferred Call Back Phone Number? [phone]  ---------------------------------------------------------------------------  --------------  SCRIPT ANSWERS  Relationship to Patient?  Self

## 2022-01-28 ENCOUNTER — HOSPITAL ENCOUNTER (OUTPATIENT)
Age: 62
Discharge: HOME | End: 2022-01-28
Payer: MEDICARE

## 2022-01-28 DIAGNOSIS — E78.49 OTHER HYPERLIPIDEMIA: ICD-10-CM

## 2022-01-28 DIAGNOSIS — R73.03 PREDIABETES: ICD-10-CM

## 2022-01-28 DIAGNOSIS — R53.83 OTHER FATIGUE: ICD-10-CM

## 2022-01-28 DIAGNOSIS — Z00.00 ROUTINE GENERAL MEDICAL EXAMINATION AT A HEALTH CARE FACILITY: ICD-10-CM

## 2022-01-28 DIAGNOSIS — E03.8 OTHER SPECIFIED HYPOTHYROIDISM: ICD-10-CM

## 2022-01-28 LAB
ANION GAP SERPL CALCULATED.3IONS-SCNC: 10 MMOL/L (ref 3–16)
BASOPHILS ABSOLUTE: 0 K/UL (ref 0–0.2)
BASOPHILS RELATIVE PERCENT: 1.1 %
BUN BLDV-MCNC: 13 MG/DL (ref 7–20)
CALCIUM SERPL-MCNC: 10.1 MG/DL (ref 8.3–10.6)
CHLORIDE BLD-SCNC: 102 MMOL/L (ref 99–110)
CHOLESTEROL, FASTING: 210 MG/DL (ref 0–199)
CO2: 26 MMOL/L (ref 21–32)
CREAT SERPL-MCNC: 0.8 MG/DL (ref 0.6–1.2)
EOSINOPHILS ABSOLUTE: 0.1 K/UL (ref 0–0.6)
EOSINOPHILS RELATIVE PERCENT: 1.4 %
GFR AFRICAN AMERICAN: >60
GFR NON-AFRICAN AMERICAN: >60
GLUCOSE BLD-MCNC: 98 MG/DL (ref 70–99)
HCT VFR BLD CALC: 39.7 % (ref 36–48)
HDLC SERPL-MCNC: 48 MG/DL (ref 40–60)
HEMOGLOBIN: 12.9 G/DL (ref 12–16)
LDL CHOLESTEROL CALCULATED: 125 MG/DL
LYMPHOCYTES ABSOLUTE: 1.2 K/UL (ref 1–5.1)
LYMPHOCYTES RELATIVE PERCENT: 27.6 %
MCH RBC QN AUTO: 28.1 PG (ref 26–34)
MCHC RBC AUTO-ENTMCNC: 32.5 G/DL (ref 31–36)
MCV RBC AUTO: 86.5 FL (ref 80–100)
MONOCYTES ABSOLUTE: 0.3 K/UL (ref 0–1.3)
MONOCYTES RELATIVE PERCENT: 6.5 %
NEUTROPHILS ABSOLUTE: 2.7 K/UL (ref 1.7–7.7)
NEUTROPHILS RELATIVE PERCENT: 63.4 %
PDW BLD-RTO: 14.3 % (ref 12.4–15.4)
PLATELET # BLD: 226 K/UL (ref 135–450)
PMV BLD AUTO: 7.7 FL (ref 5–10.5)
POTASSIUM SERPL-SCNC: 4.4 MMOL/L (ref 3.5–5.1)
RBC # BLD: 4.59 M/UL (ref 4–5.2)
SODIUM BLD-SCNC: 138 MMOL/L (ref 136–145)
TRIGLYCERIDE, FASTING: 184 MG/DL (ref 0–150)
TSH SERPL DL<=0.05 MIU/L-ACNC: 0.72 UIU/ML (ref 0.27–4.2)
VLDLC SERPL CALC-MCNC: 37 MG/DL
WBC # BLD: 4.3 K/UL (ref 4–11)

## 2022-01-28 PROCEDURE — 80061 LIPID PANEL: CPT

## 2022-01-28 PROCEDURE — 80048 BASIC METABOLIC PNL TOTAL CA: CPT

## 2022-01-28 PROCEDURE — 84443 ASSAY THYROID STIM HORMONE: CPT

## 2022-01-28 PROCEDURE — 85025 COMPLETE CBC W/AUTO DIFF WBC: CPT

## 2022-01-28 PROCEDURE — 36415 COLL VENOUS BLD VENIPUNCTURE: CPT

## 2022-01-28 PROCEDURE — 83036 HEMOGLOBIN GLYCOSYLATED A1C: CPT

## 2022-01-29 LAB
ESTIMATED AVERAGE GLUCOSE: 111.2 MG/DL
HBA1C MFR BLD: 5.5 %

## 2022-02-09 ENCOUNTER — TELEPHONE (OUTPATIENT)
Dept: CARDIOLOGY CLINIC | Age: 62
End: 2022-02-09

## 2022-02-14 ENCOUNTER — TELEPHONE (OUTPATIENT)
Dept: INTERNAL MEDICINE CLINIC | Age: 62
End: 2022-02-14

## 2022-02-14 NOTE — TELEPHONE ENCOUNTER
Patient calling requesting refill of DULoxetine (CYMBALTA) 60 MG extended release capsule & colestipol (COLESTID) 1 g tablet for 90 day supply. Last written 01/26/22  Last OV 12/15/21  Next OV 06/15/22  Last recommended OV 06/15/22     Please send to [facility].

## 2022-02-28 DIAGNOSIS — E78.49 OTHER HYPERLIPIDEMIA: ICD-10-CM

## 2022-02-28 DIAGNOSIS — M79.7 FIBROMYALGIA: ICD-10-CM

## 2022-02-28 NOTE — TELEPHONE ENCOUNTER
----- Message from Meri Butcher sent at 2/28/2022  1:22 PM EST -----  Subject: Refill Request    QUESTIONS  Name of Medication? DULoxetine (CYMBALTA) 60 MG extended release capsule  Patient-reported dosage and instructions? 1 tablet daily  How many days do you have left? 6  Preferred Pharmacy? [facility] phone number (if available)? [phone]  Additional Information for Provider? 90-day refill request  ---------------------------------------------------------------------------  --------------,  Name of Medication? colestipol (COLESTID) 1 g tablet  Patient-reported dosage and instructions? 1 in AM and 1 in PM  How many days do you have left? 3  Preferred Pharmacy? [facility] phone number (if available)? [phone]  Additional Information for Provider? 90-day refill request  ---------------------------------------------------------------------------  --------------  CALL BACK INFO  What is the best way for the office to contact you? OK to leave message on   voicemail  Preferred Call Back Phone Number?  [phone]

## 2022-03-01 RX ORDER — DULOXETIN HYDROCHLORIDE 60 MG/1
CAPSULE, DELAYED RELEASE ORAL
Qty: 90 CAPSULE | Refills: 0 | Status: SHIPPED | OUTPATIENT
Start: 2022-03-01 | End: 2022-06-03 | Stop reason: SDUPTHER

## 2022-03-01 RX ORDER — MONTELUKAST SODIUM 4 MG/1
TABLET, CHEWABLE ORAL
Qty: 60 TABLET | Refills: 0 | Status: SHIPPED | OUTPATIENT
Start: 2022-03-01 | End: 2022-04-15 | Stop reason: SDUPTHER

## 2022-04-05 DIAGNOSIS — E78.49 OTHER HYPERLIPIDEMIA: ICD-10-CM

## 2022-04-05 DIAGNOSIS — M79.7 FIBROMYALGIA: ICD-10-CM

## 2022-04-06 RX ORDER — DULOXETIN HYDROCHLORIDE 60 MG/1
CAPSULE, DELAYED RELEASE ORAL
Qty: 90 CAPSULE | Refills: 0 | OUTPATIENT
Start: 2022-04-06

## 2022-04-06 RX ORDER — MONTELUKAST SODIUM 4 MG/1
TABLET, CHEWABLE ORAL
Qty: 60 TABLET | Refills: 0 | OUTPATIENT
Start: 2022-04-06

## 2022-04-14 ENCOUNTER — TELEPHONE (OUTPATIENT)
Dept: INTERNAL MEDICINE CLINIC | Age: 62
End: 2022-04-14

## 2022-04-14 DIAGNOSIS — E78.49 OTHER HYPERLIPIDEMIA: ICD-10-CM

## 2022-04-14 NOTE — TELEPHONE ENCOUNTER
[address]  calling requesting refill of Colestipol     90 day fill    Last written 03/01/22  Last OV  12/15/21  Next OV 6/5/22  Last recommended OV NA     Please send to [address]      ALSO---THEY ARE ASKING [facility]   BECAUSE PT IS OUT   thanks

## 2022-04-15 RX ORDER — MONTELUKAST SODIUM 4 MG/1
TABLET, CHEWABLE ORAL
Qty: 180 TABLET | Refills: 0 | Status: SHIPPED | OUTPATIENT
Start: 2022-04-15 | End: 2022-06-28 | Stop reason: SDUPTHER

## 2022-04-15 RX ORDER — MONTELUKAST SODIUM 4 MG/1
TABLET, CHEWABLE ORAL
Qty: 28 TABLET | Refills: 0 | Status: SHIPPED | OUTPATIENT
Start: 2022-04-15 | End: 2022-04-15 | Stop reason: SDUPTHER

## 2022-04-29 NOTE — TELEPHONE ENCOUNTER
Med Refill   Pt has a [address]    rivaroxaban (XARELTO) 20 MG TABS tablet   20 mg  90 tablet  Take 1 tablet by mouth Daily with supper    [address] [place] [address], [address] [place] [address] 35 [phone] Caterina Hensley [phone] [address], [place] [address]   Phone:  [phone]  Fax:  [phone]    Pls advise thank you

## 2022-04-29 NOTE — TELEPHONE ENCOUNTER
Received refill request for Xarelto     Last OV: 08/10/2021 w/ NPAL     Last Refill: 09/22/2021 #90 w/ 3 refills     Next Appointment: on recall list for appt on 02/06/2022 w/ NPAL

## 2022-05-02 ENCOUNTER — TELEPHONE (OUTPATIENT)
Dept: CARDIOLOGY CLINIC | Age: 62
End: 2022-05-02

## 2022-05-02 NOTE — TELEPHONE ENCOUNTER
Pt called seeing if she can get samples for    rivaroxaban (XARELTO) 20 MG TABS tablet until her mail away comes in she will be out on Wed.     Pls advise thank you     Noni Khna  [phone]

## 2022-05-02 NOTE — TELEPHONE ENCOUNTER
Last OV : 08/10/2021 with NPAL  Next OV : No scheduled OV    Provided the patient with 2 samples/bottles of Xarelto 20mg. LOT # : [id number]  EXP : 04/24    Call placed to the patient and advised him/her that samples have been placed up front . Pt voiced understanding.  Call complete

## 2022-05-07 ENCOUNTER — HOSPITAL ENCOUNTER (OUTPATIENT)
Dept: PHYSICAL THERAPY | Age: 62
Setting detail: THERAPIES SERIES
Discharge: HOME | End: 2022-05-07
Payer: MEDICARE

## 2022-05-07 PROCEDURE — 97530 THERAPEUTIC ACTIVITIES: CPT

## 2022-05-07 PROCEDURE — 97161 PT EVAL LOW COMPLEX 20 MIN: CPT

## 2022-05-07 NOTE — PLAN OF CARE
[address]  Phone: [phone]   Fax: [phone]                                                       Physical Therapy Certification    Dear Sun Herring,    We had the pleasure of evaluating the following patient for physical therapy services at [address] [place] [address]. A summary of our findings can be found in the initial assessment below. This includes our plan of care. If you have any questions or concerns regarding these findings, please do not hesitate to contact me at the office phone number checked above. Thank you for the referral.       Physician Signature:_______________________________Date:__________________  By signing above (or electronic signature), therapists plan is approved by physician      Patient: Ricky Mendez   : 1960   MRN: [id number]  Referring Physician: Sun Herring,      Evaluation Date: 2022      Medical Diagnosis Information:  Diagnosis: S92.252A L navicular fracture   Treatment Diagnosis: L foot pain, decreased independence with transfers, abnormal gait, impaired balance                                         Insurance information: PT Insurance Information: [place]     Precautions/ Contra-indications: NWB  Latex Allergy:  [x]NO      []YES  Preferred Language for Healthcare:   [x]English       []Other:    C-SSRS Triggered by Intake questionnaire (Past 2 wk assessment ):   [x] No, Questionnaire did not trigger screening.   [] Yes, Patient intake triggered C-SSRS Screening     [] Completed, no further action required. [] Completed, PCP notified via Epic    SUBJECTIVE: Patient reports she has been sent to PT pre-op to teach her how to do the stairs and navigate her home after surgery. She will then be seen for physical therapy at [facility] because they know Dr. Luan Dewitt protocol.  She will be having surgery on a broken bone in her foot. She thinks the navicular. Surgery is scheduled for 5/9/21. Symptoms are located on the medial aspect of her foot. Symptoms are described as achy. She has been using a knee scooter for approximately a week. She has RW and B axillary crutches for home which she has brought today. She has practiced with crutches, but really struggles. She is waiting on knee replacement on the the R so the leg is very weak. Relevant Medical History:  PMHx: A-fib, anxiety, back pain, depression, fibromyalgia, hyperlipidemia, HTN, hypothyroidism, prediabetes, L knee surgery 2017, R shld arthroplasty 2019  Functional Outcome: FOTO physical FS primary measure score = ; Risk adjusted = not completed    Pain Scale: 1/10  Easing factors: Tylenol  Provocative factors: walking, standing     Type: [x]Constant   []Intermittent  []Radiating []Localized []other:     Numbness/Tingling: Denies    Occupation/School: on disability; enjoys swimming, going to the gym, would like to get out in yard    Living Status/Prior Level of Function:Prior to this injury / incident, pt was independent with ADLs and IADLs,    Home: lives with  who is doing most of the housework and helping with ADLs at this time; one story home; has 2 steps to enter/exit these stairs have landings, laundry on first floor, plans to sponge, hand held shower walk in shower with 3\" lip with shower chair      OBJECTIVE:   Palpation:     Functional Mobility/Transfers: impulsive with all transfers; requires SBA for lateral transfers and sit to stand transfers and ambulation    Posture: heavy dependence on UE when standing NWB on L    Bandages/Dressings/Incisions: NA    Gait: (include devices/WB status) tall walking boot; SBA with rolling walker x 3 ft; independent with knee scooter    Dermatomes Normal Abnormal Comments-NT   inguinal area (L1)       anterior mid-thigh (L2)      distal ant thigh/med knee (L3)      medial lower leg and foot (L4)      lateral lower leg and foot (L5)      posterior calf (S1)      medial calcaneus (S2)          Reflexes Normal Abnormal Comments-NT   S1-2 Seated achilles      S1-2 Prone knee bend      L3-4 Patellar tendon      Clonus      Babinski        Lumbar AROM screen: [] WFL  [] abnormal:     PROM AROM    L R L R   Hip Flexion       Hip Abduction       Hip ER       Hip IR       Knee Flexion       Knee Extension       Dorsiflexion        Plantarflexion        Inversion        Eversion            Strength (0-5) / Myotomes Left Right   Hip Flexion - supine     Hip Flexion - seated (L1-2) 4+ 4+   Hip Abduction-seated 4+ 4+   Hip Adduction-seated 4 4   Hip ER     Hip IR     Quads (L2-4) 4+ 4+   Hamstrings 4+ 4+   Ankle Dorsiflexion (L4-5) NT 4+   Ankle Plantarflexion (S1-2) NT 4+   Ankle Inversion NT 4+   Ankle Eversion (S1-2) NT 4+   Great Toe Extension (L5)          Flexibility     Hamstrings (90/90)     ITB (Lion)     Quads (Ely's)     Hip Flexor Cathleen Quang)          Girth (cm)     Mid patella     Suprapatellar     Figure 8     Transmalleolar     Metatarsal Heads         Joint mobility:    []Normal    []Hypo   []Hyper    Orthopaedic Special Tests  Positive  Negative  NT Comments    Hip   x    NICKOLAS / Daryl's       FADIR       Scour       Trendelenburg              Knee   x    Lachman's / Anterior Drawer       Posterior Drawer       Varus Stress       Valgus Stress       Eris's        Appley's       Thessaly's       Patellar Tracking              Ankle   x    Anterior Drawer       Talar Tilt       Sloan       Anna's                   Balance: Fair dynamic balance with RW, good dynamic balance with knee scooter                         [x] Patient history, allergies, meds reviewed. Medical chart reviewed. See intake form. Review Of Systems (ROS):  [x]Performed Review of systems (Integumentary, CardioPulmonary, Neurological) by intake and observation. Intake form has been scanned into medical record.  Patient has been instructed to contact their primary care physician regarding ROS issues if not already being addressed at this time. Co-morbidities/Complexities (which will affect course of rehabilitation):   []None        []Hx of COVID   Arthritic conditions   []Rheumatoid arthritis (M05.9)  []Osteoarthritis (M19.91)  []Gout   Cardiovascular conditions   [x]Hypertension (I10)  [x]Hyperlipidemia (E78.5)  []Angina pectoris (I20)  []Atherosclerosis (I70)  []Pacemaker  []Hx of CABG/stent/  cardiac surgeries   Musculoskeletal conditions   []Disc pathology   []Congenital spine pathologies   []Osteoporosis (M81.8)  []Osteopenia (M85.8)  []Scoliosis       Endocrine conditions   [x]Hypothyroid (E03.9)  []Hyperthyroid Gastrointestinal conditions   []Constipation (R06.97)   Metabolic conditions   []Morbid obesity (E66.01)  []Diabetes type 1(E10.65) or 2 (E11.65)   []Neuropathy (G60.9)     Cardio/Pulmonary conditions   []Asthma (J45)  []Coughing   []COPD (J44.9)  []CHF  []A-fib   Psychological Disorders  [x]Anxiety (F41.9)  [x]Depression (F32.9)   []Other:   Developmental Disorders  []Autism (F84.0)  []CP (G80)  []Down Syndrome (Q90.9)  []Developmental delay     Neurological conditions  []Prior Stroke (I69.30)  []Parkinson's (G20)  []Encephalopathy (G93.40)  []MS (G35)  []Post-polio (G14)  []SCI  []TBI  []ALS Other conditions  [x]Fibromyalgia (M79.7)  []Vertigo  []Syncope  []Kidney Failure  []Cancer      []currently undergoing                treatment  []Pregnancy  []Incontinence   Prior surgeries  []involved limb  []previous spinal surgery  [] section birth  []hysterectomy  []bowel / bladder surgery  []other relevant surgeries   [x]Other:    PMHx: A-fib, prediabetes, L knee surgery 2017, R shld arthroplasty 2019           Barriers to/and or personal factors that will affect rehab potential:              []Age  []Sex    []Smoker              []Motivation/Lack of Motivation                        [x]Co-Morbidities              []Cognitive Function, education/learning barriers              []Environmental, home barriers              []profession/work barriers  [x]past PT/medical experience  []other:  Justification:     Falls Risk Assessment (30 days):   [x] Falls Risk assessed and no intervention required. [] Falls Risk assessed and Patient requires intervention due to being higher risk   TUG score (>12s at risk):     [] Falls education provided, including        ASSESSMENT: Pt is a 65 yo female referred to PT for L ankle pain (pre-op surgical intervention for navicular fracture). She presents with deficits in posture, strength, independence with transfers and gait. She requires SBA for transfers and ambulation with RW. She demonstrates understanding re gait training for stair ambulation post op. These deficits contribute to pain and decreased functional status. She will benefit from skilled therapy to address these deficits, achieve goals, and progress to PLOF following surgery. She does not plan to return to PT until post op and will follow up with a different office ([place]).      Functional Impairments:     []Noted lumbar/proximal hip/LE joint hypomobility   []Decreased LE functional ROM   [x]Decreased core/proximal hip strength and neuromuscular control   [x]Decreased LE functional strength   [x]Reduced balance/proprioceptive control   []other:      Functional Activity Limitations (from functional questionnaire and intake)   [x]Reduced ability to tolerate prolonged functional positions   [x]Reduced ability or difficulty with changes of positions or transfers between positions   [x]Reduced ability to maintain good posture and demonstrate good body mechanics with sitting, bending, and lifting   [x]Reduced ability to sleep   [x] Reduced ability or tolerance with driving and/or computer work   [x]Reduced ability to perform lifting, carrying tasks   [x]Reduced ability to squat   [x]Reduced ability to forward bend   [x]Reduced ability to ambulate prolonged functional periods/distances/surfaces   [x]Reduced ability to ascend/descend stairs   [x]Reduced ability to run, hop, cut or jump   []other:    Participation Restrictions   [x]Reduced participation in self care activities   [x]Reduced participation in home management activities   []Reduced participation in work activities   [x]Reduced participation in social activities. [x]Reduced participation in sport/recreation activities. Classification :    []Signs/symptoms consistent with post-surgical status including decreased ROM, strength and function.    []Signs/symptoms consistent with joint sprain/strain   []Signs/symptoms consistent with patella-femoral syndrome   []Signs/symptoms consistent with knee OA/hip OA   []Signs/symptoms consistent with internal derangement of knee/Hip   []Signs/symptoms consistent with functional hip weakness/NMR control      []Signs/symptoms consistent with tendinitis/tendinosis    []signs/symptoms consistent with pathology which may benefit from Dry needling      [x]other:      Prognosis/Rehab Potential:      []Excellent   [x]Good    []Fair   []Poor    Tolerance of evaluation/treatment:    []Excellent   [x]Good    [x]Fair   []Poor    Physical Therapy Evaluation Complexity Justification  [x] A history of present problem with:  [] no personal factors and/or comorbidities that impact the plan of care;  []1-2 personal factors and/or comorbidities that impact the plan of care  [x]3 personal factors and/or comorbidities that impact the plan of care  [x] An examination of body systems using standardized tests and measures addressing any of the following: body structures and functions (impairments), activity limitations, and/or participation restrictions;:  [] a total of 1-2 or more elements   [x] a total of 3 or more elements   [] a total of 4 or more elements   [x] A clinical presentation with:  [x] stable and/or uncomplicated characteristics   [] evolving clinical presentation with changing characteristics  [] unstable and unpredictable characteristics;   [x] Clinical decision making of [x] Low, [] moderate, [] high complexity using standardized patient assessment instrument and/or measurable assessment of functional outcome. [x] EVAL (LOW) 89323 (typically 15 minutes face-to-face)  [] EVAL (MOD) 96314 (typically 30 minutes face-to-face)  [] EVAL (HIGH) 61394 (typically 45 minutes face-to-face)  [] RE-EVAL     PLAN: DC this date. Return post op for eval (plans to follow up at [facility])  Frequency/Duration:  Interventions:  [x]  Therapeutic exercise including: strength training, ROM, for Lower extremity and core   [x]  NMR activation and proprioception for LE, Glutes and Core   [x]  Manual therapy as indicated for LE, Hip and spine to include: Dry Needling/IASTM, STM, PROM, Gr I-IV mobilizations, manipulation. [x] Modalities as needed that may include: thermal agents, E-stim, Biofeedback, US, iontophoresis as indicated  [x] Patient education on joint protection, postural re-education, activity modification, progression of HEP. HEP instruction: Written HEP instructions provided and reviewed.         Electronically signed by:  Robyn Batista, PT, DPT

## 2022-05-07 NOTE — FLOWSHEET NOTE
[address] [place] [address] Ro De Souzao  Phone: [phone]   Fax: [phone]    Physical Therapy Treatment Note/ Progress Report:     Date:  2022    Patient Name:  Frankie Gillis    :  1960  MRN: [id number]  Restrictions/Precautions:    Medical/Treatment Diagnosis Information:  Diagnosis: L navicular fracture (S92.252A)  Treatment Diagnosis: L foot pain, decreased independence with transfers, abnormal gait, impaired balance  Insurance/Certification information:  PT Insurance Information: [place]  Physician Information:  Lauren De La Torre  Plan of care signed (Y/N): []  Yes [x]  No     Date of Patient follow up with Physician: surgery 22     Progress Report: []  Yes  [x]  No     Date Range for reporting period:  Beginnin22  Endin22    Progress report due (10 Rx/or 30 days whichever is less): pt DC this date    Recertification due (POC duration/ or 90 days whichever is less): pt DC this date    Visit # Insurance Allowable Auth required?  Date Range   1 Not requested as pt DC this date [x]  Yes  []  No Not requested as pt DC this date         Latex Allergy:  [x]NO      []YES  Preferred Language for Healthcare:   [x]English       []other:    Functional Scale:           Date assessed:  FOTO physical FS primary measure score = ; risk adjusted =    22-not collected    Pain level:  0/10     SUBJECTIVE:  See eval    OBJECTIVE: See eval      RESTRICTIONS/PRECAUTIONS: NWB    Exercises/Interventions:     Therapeutic Exercise (44645)  Resistance / level Sets/sec Reps Notes / Cues                                                                         Therapeutic Activities (95269)       Pt ed regarding WB precautions, sizing RW and axillary crutches, safety with transfers, ambulation with RW x 3 ft, demonstration of stair ascent and descent with RW, pt ed regarding preparing home environment for post op to decrease fall risk  X 25 min                          Neuromuscular Re-ed (56304)                            Manual Intervention ([phone])       Knee mobs/PROM       Tib/Fem Mobs       Patella Mobs       Ankle mobs                         Modalities:     Pt. Education:  -pt educated on diagnosis, prognosis and expectations for rehab  -all pt questions were answered    Home Exercise Program:  Written instructions provided regarding sequencing of RW for stair ascent/descent frwds and lateral    Therapeutic Exercise and NMR EXR  [] (50072) Provided verbal/tactile cueing for activities related to strengthening, flexibility, endurance, ROM for improvements in LE, proximal hip, and core control with self care, mobility, lifting, ambulation.  [] (79340) Provided verbal/tactile cueing for activities related to improving balance, coordination, kinesthetic sense, posture, motor skill, proprioception  to assist with LE, proximal hip, and core control in self care, mobility, lifting, ambulation and eccentric single leg control.   [] (48096) Therapist is in constant attendance of 2 or more patients providing skilled therapy interventions, but not providing any significant amount of measurable one-on-one time to either patient, for improvements in LE, proximal hip, and core control in self care, mobility, lifting, ambulation and eccentric single leg control.      NMR and Therapeutic Activities:    [x] (30103 or 25115) Provided verbal/tactile cueing for activities related to improving balance, coordination, kinesthetic sense, posture, motor skill, proprioception and motor activation to allow for proper function of core, proximal hip and LE with self care and ADLs  [] (22861) Gait Re-education- Provided training and instruction to the patient for proper LE, core and proximal hip recruitment and positioning and eccentric body weight control with ambulation re-education including up and down stairs     Home Exercise Program:    [x] (06206) Reviewed/Progressed HEP activities related to strengthening, flexibility, endurance, ROM of core, proximal hip and LE for functional self-care, mobility, lifting and ambulation/stair navigation   [] (77122)Reviewed/Progressed HEP activities related to improving balance, coordination, kinesthetic sense, posture, motor skill, proprioception of core, proximal hip and LE for self care, mobility, lifting, and ambulation/stair navigation      Manual Treatments:  PROM / STM / Oscillations-Mobs:  G-I, II, III, IV (PA's, Inf., Post.)  [] (44149) Provided manual therapy to mobilize LE, proximal hip and/or LS spine soft tissue/joints for the purpose of modulating pain, promoting relaxation,  increasing ROM, reducing/eliminating soft tissue swelling/inflammation/restriction, improving soft tissue extensibility and allowing for proper ROM for normal function with self care, mobility, lifting and ambulation. Modalities:  [] (25327) Vasopneumatic compression: Utilized vasopneumatic compression to decrease edema / swelling for the purpose of improving mobility and quad tone / recruitment which will allow for increased overall function including but not limited to self-care, transfers, ambulation, and ascending / descending stairs. Charges:  Timed Code Treatment Minutes: 25   Total Treatment Minutes: 40     [x] EVAL - LOW (60171)   [] EVAL - MOD (85316)  [] EVAL - HIGH (33854)  [] RE-EVAL (20473)  [] FO(19243) x       [] Ionto  [] NMR (46605) x       [] Vaso  [] Manual (15261) x       [] Ultrasound  [x] TA x 2       [] Mech Traction (28532)  [] Aquatic Therapy x     [] ES (un) (23868):   [] Home Management Training x  [] ES(attended) (69954)   [] Dry Needling 1-2 muscles (67607):  [] Dry Needling 3+ muscles (088395  [] Group:      [] Other:     GOALS: pt DC this date    Overall Progression Towards Functional goals/ Treatment Progress Update:  [] Patient is progressing as expected towards functional goals listed. [] Progression is slowed due to complexities/Impairments listed.   [] Progression has been slowed due to co-morbidities. [] Plan just implemented, too soon to assess goals progression <30days   [] Goals require adjustment due to lack of progress  [] Patient is not progressing as expected and requires additional follow up with physician  [x] Other    Persisting Functional Limitations/Impairments:  []Sitting [x]Standing   [x]Walking [x]Stairs   [x]Transfers [x]ADLs   [x]Squatting/bending []Kneeling  [x]Housework []Job related tasks  [x]Driving [x]Sports/Recreation   [x]Sleeping []Other:    ASSESSMENT:  See eval  Treatment/Activity Tolerance:  [x] Pt able to complete treatment [] Patient limited by fatique  [x] Patient limited by pain  [] Patient limited by other medical complications  [] Other:     Prognosis:  [x] Good [] Fair  [] Poor    Patient Requires Follow-up: [x] Yes  [] No    Return to Play:    [x]  N/A   []  Stage 1: Intro to Strength   []  Stage 2: Return to Run and Strength   []  Stage 3: Return to Jump and Strength   []  Stage 4: Dynamic Strength and Agility   []  Stage 5: Sport Specific Training     []  Ready to Return to Play, Meets [employer]   []  Not Ready for Return to Sports   Comments:            PLAN: DC will follow up post op at [facility]  [] Continue per plan of care [] Alter current plan (see comments)  [] Plan of care initiated [] Hold pending MD visit [x] Discharge    Electronically signed by: Jenni Godinez PT, DPT      Note: If patient does not return for scheduled/ recommended follow up visits, this note will serve as a discharge from care along with most recent update on progress.

## 2022-05-23 ENCOUNTER — TELEPHONE (OUTPATIENT)
Dept: INTERNAL MEDICINE CLINIC | Age: 62
End: 2022-05-23

## 2022-05-23 DIAGNOSIS — E03.8 OTHER SPECIFIED HYPOTHYROIDISM: ICD-10-CM

## 2022-05-23 DIAGNOSIS — J30.89 SEASONAL ALLERGIC RHINITIS DUE TO OTHER ALLERGIC TRIGGER: ICD-10-CM

## 2022-05-23 NOTE — TELEPHONE ENCOUNTER
Pt  calling requesting refill of Levothyroxine (12/10/21) Montelukast (10/5/21) and Bupropion (11/17/21)      Last written see above  Last OV  12/15/21  Next OV 6/15/22  Last recommended OV NA    Please send to   [facility]         ALSO---pt needs 2 week supply of Levothyroxine sent to [facility] on [address]--she is out of this medication

## 2022-05-24 RX ORDER — LEVOTHYROXINE SODIUM 112 UG/1
TABLET ORAL
Qty: 90 TABLET | Refills: 0 | Status: SHIPPED | OUTPATIENT
Start: 2022-05-24 | End: 2022-06-28 | Stop reason: SDUPTHER

## 2022-05-24 RX ORDER — MONTELUKAST SODIUM 10 MG/1
TABLET ORAL
Qty: 90 TABLET | Refills: 1 | Status: SHIPPED | OUTPATIENT
Start: 2022-05-24 | End: 2022-06-03 | Stop reason: SDUPTHER

## 2022-06-03 ENCOUNTER — TELEPHONE (OUTPATIENT)
Dept: INTERNAL MEDICINE CLINIC | Age: 62
End: 2022-06-03

## 2022-06-03 DIAGNOSIS — J30.89 SEASONAL ALLERGIC RHINITIS DUE TO OTHER ALLERGIC TRIGGER: ICD-10-CM

## 2022-06-03 DIAGNOSIS — M79.7 FIBROMYALGIA: ICD-10-CM

## 2022-06-03 RX ORDER — DULOXETIN HYDROCHLORIDE 60 MG/1
60 CAPSULE, DELAYED RELEASE ORAL DAILY
Qty: 90 CAPSULE | Refills: 1 | Status: SHIPPED | OUTPATIENT
Start: 2022-06-03

## 2022-06-03 RX ORDER — MONTELUKAST SODIUM 10 MG/1
TABLET ORAL
Qty: 90 TABLET | Refills: 1 | Status: SHIPPED | OUTPATIENT
Start: 2022-06-03

## 2022-06-03 NOTE — TELEPHONE ENCOUNTER
Patient calling requesting refill of DULoxetine (CYMBALTA) 60 MG extended release capsule. Patient states she has 6 caps remaining. Last written 03/01/22  Last OV 12/15/21  Next OV 06/15/22  Last recommended OV 06/15/22     Please send to [facility]. Patient is also requesting prescription for montelukast (SINGULAIR) 10 MG tablet that was sent 5/24 to [facility] be sent to [facility]. Patient states she is out of this medication.

## 2022-06-28 ENCOUNTER — OFFICE VISIT (OUTPATIENT)
Dept: INTERNAL MEDICINE CLINIC | Age: 62
End: 2022-06-28
Payer: MEDICARE

## 2022-06-28 VITALS
HEIGHT: 64 IN | OXYGEN SATURATION: 96 % | DIASTOLIC BLOOD PRESSURE: 82 MMHG | BODY MASS INDEX: 41.38 KG/M2 | WEIGHT: 242.4 LBS | SYSTOLIC BLOOD PRESSURE: 126 MMHG | HEART RATE: 71 BPM

## 2022-06-28 DIAGNOSIS — G47.39 OTHER SLEEP APNEA: ICD-10-CM

## 2022-06-28 DIAGNOSIS — I48.0 PAF (PAROXYSMAL ATRIAL FIBRILLATION) (HCC): ICD-10-CM

## 2022-06-28 DIAGNOSIS — J30.89 SEASONAL ALLERGIC RHINITIS DUE TO OTHER ALLERGIC TRIGGER: ICD-10-CM

## 2022-06-28 DIAGNOSIS — I10 ESSENTIAL HYPERTENSION: Primary | ICD-10-CM

## 2022-06-28 DIAGNOSIS — M79.7 FIBROMYALGIA: ICD-10-CM

## 2022-06-28 DIAGNOSIS — E78.49 OTHER HYPERLIPIDEMIA: ICD-10-CM

## 2022-06-28 DIAGNOSIS — E03.8 OTHER SPECIFIED HYPOTHYROIDISM: ICD-10-CM

## 2022-06-28 DIAGNOSIS — R73.03 PREDIABETES: ICD-10-CM

## 2022-06-28 DIAGNOSIS — J45.909 UNCOMPLICATED ASTHMA, UNSPECIFIED ASTHMA SEVERITY, UNSPECIFIED WHETHER PERSISTENT: ICD-10-CM

## 2022-06-28 DIAGNOSIS — Z63.79 STRESSFUL LIFE EVENT AFFECTING FAMILY: ICD-10-CM

## 2022-06-28 LAB — HBA1C MFR BLD: 5.5 %

## 2022-06-28 PROCEDURE — 99214 OFFICE O/P EST MOD 30 MIN: CPT | Performed by: NURSE PRACTITIONER

## 2022-06-28 PROCEDURE — 83036 HEMOGLOBIN GLYCOSYLATED A1C: CPT | Performed by: NURSE PRACTITIONER

## 2022-06-28 RX ORDER — BUDESONIDE AND FORMOTEROL FUMARATE DIHYDRATE 160; 4.5 UG/1; UG/1
2 AEROSOL RESPIRATORY (INHALATION) 2 TIMES DAILY
COMMUNITY
End: 2022-07-27

## 2022-06-28 RX ORDER — MONTELUKAST SODIUM 4 MG/1
TABLET, CHEWABLE ORAL
Qty: 180 TABLET | Refills: 1 | Status: SHIPPED | OUTPATIENT
Start: 2022-06-28

## 2022-06-28 RX ORDER — ALBUTEROL SULFATE 90 UG/1
2 AEROSOL, METERED RESPIRATORY (INHALATION) EVERY 6 HOURS PRN
COMMUNITY

## 2022-06-28 RX ORDER — AMOXICILLIN 500 MG/1
500 CAPSULE ORAL PRN
COMMUNITY
End: 2022-07-27

## 2022-06-28 RX ORDER — LEVOTHYROXINE SODIUM 112 UG/1
TABLET ORAL
Qty: 90 TABLET | Refills: 1 | Status: SHIPPED | OUTPATIENT
Start: 2022-06-28

## 2022-06-28 ASSESSMENT — ENCOUNTER SYMPTOMS
SHORTNESS OF BREATH: 0
COUGH: 0
WHEEZING: 0
ABDOMINAL PAIN: 0
CONSTIPATION: 0
VOMITING: 0
DIARRHEA: 0
NAUSEA: 0

## 2022-06-28 ASSESSMENT — PATIENT HEALTH QUESTIONNAIRE - PHQ9
SUM OF ALL RESPONSES TO PHQ QUESTIONS 1-9: 6
9. THOUGHTS THAT YOU WOULD BE BETTER OFF DEAD, OR OF HURTING YOURSELF: 0
SUM OF ALL RESPONSES TO PHQ QUESTIONS 1-9: 6
1. LITTLE INTEREST OR PLEASURE IN DOING THINGS: 0
SUM OF ALL RESPONSES TO PHQ QUESTIONS 1-9: 6
10. IF YOU CHECKED OFF ANY PROBLEMS, HOW DIFFICULT HAVE THESE PROBLEMS MADE IT FOR YOU TO DO YOUR WORK, TAKE CARE OF THINGS AT HOME, OR GET ALONG WITH OTHER PEOPLE: 0
8. MOVING OR SPEAKING SO SLOWLY THAT OTHER PEOPLE COULD HAVE NOTICED. OR THE OPPOSITE, BEING SO FIGETY OR RESTLESS THAT YOU HAVE BEEN MOVING AROUND A LOT MORE THAN USUAL: 0
7. TROUBLE CONCENTRATING ON THINGS, SUCH AS READING THE NEWSPAPER OR WATCHING TELEVISION: 0
2. FEELING DOWN, DEPRESSED OR HOPELESS: 0
5. POOR APPETITE OR OVEREATING: 1
4. FEELING TIRED OR HAVING LITTLE ENERGY: 3
SUM OF ALL RESPONSES TO PHQ9 QUESTIONS 1 & 2: 0
SUM OF ALL RESPONSES TO PHQ QUESTIONS 1-9: 6
3. TROUBLE FALLING OR STAYING ASLEEP: 2
6. FEELING BAD ABOUT YOURSELF - OR THAT YOU ARE A FAILURE OR HAVE LET YOURSELF OR YOUR FAMILY DOWN: 0

## 2022-06-28 NOTE — PROGRESS NOTES
Office Visit   6/28/2022    Subjective:  Chief Complaint   Patient presents with    6 Month Follow-Up    Hypertension     HPI:   Alice Hayes is a 64 y.o. female who presents to the clinic today for follow up. HTN/afib- seeing cardiology. Had an ablation. BP at home has been running 120/70-80s. Asymptomatic. Denies chest pain, palpitations, shortness of breath, trouble breathing, lightheadedness, dizziness or blurred vision. Hyperlipidemia- Takes colestipol 1 gram BID. States she tried 3-4 statins and \"they made me crazy. \" Not interested in stains.      Mood- Pt takes cymbalta 60 mg daily. Denies side effects. She states her mood is working well- \"I am fine. \" Denies SI/HI. Not seeing a psychologist.      Prediabetes-  Exercising. Diet is reported as \"pretty good. \" Seeing weight management.     Sleep apnea- Has CPAP- states she is not using this. Has weight management provider. States she is getting a new CPAP and got an order today. Allergic rhinitis- Taking singulair and zyrtec with relief. Denies side effects. Asthma- uses symbicort daily (in spring and fall) and albuterol PRN. well controlled per pt. Denies trouble breathing/SOB/wheezing. Fibromyalgia-  Chronic joint pains. Follows with rheumatology. Seeing [facility] for back pains- performing PT. Having a knee replacement.      Hypothyroidism- Taking synthroid 112 mcg for years. Asymptomatic. Denies side effects.     Review of Systems   Constitutional: Negative for chills, fatigue, fever and unexpected weight change. Eyes: Negative for visual disturbance. Respiratory: Negative for cough, shortness of breath and wheezing. Cardiovascular: Negative for chest pain, palpitations and leg swelling. Gastrointestinal: Negative for abdominal pain, constipation, diarrhea, nausea and vomiting. Skin: Negative for pallor and rash. Neurological: Negative for dizziness, weakness, light-headedness, numbness and headaches. Psychiatric/Behavioral: Negative for dysphoric mood, self-injury, sleep disturbance and suicidal ideas. The patient is not nervous/anxious.       Allergies   Allergen Reactions    Latex Rash    Prednisone      Other reaction(s): Per patient-blow up    Statins Nausea Only     Current Outpatient Rx   Medication Sig Dispense Refill    amoxicillin (AMOXIL) 500 MG capsule Take 500 mg by mouth as needed 4 capsules 1 hr prior to dental appointments      budesonide-formoterol (SYMBICORT) 160-4.5 MCG/ACT AERO Inhale 2 puffs into the lungs 2 times daily      albuterol sulfate HFA (VENTOLIN HFA) 108 (90 Base) MCG/ACT inhaler Inhale 2 puffs into the lungs every 6 hours as needed for Wheezing      colestipol (COLESTID) 1 g tablet TAKE 1 TABLET TWICE A  tablet 1    levothyroxine (SYNTHROID) 112 MCG tablet TAKE 1 TABLET EVERY DAY 90 tablet 1    DULoxetine (CYMBALTA) 60 MG extended release capsule Take 1 capsule by mouth daily 90 capsule 1    montelukast (SINGULAIR) 10 MG tablet TAKE 1 TABLET EVERY NIGHT 90 tablet 1    rivaroxaban (XARELTO) 20 MG TABS tablet Take 1 tablet by mouth Daily with supper 90 tablet 3    cyclobenzaprine (FLEXERIL) 10 MG tablet TAKE 1 TABLET NIGHTLY AS NEEDED FOR MUSCLE SPASMS 90 tablet 0    naltrexone (DEPADE) 50 MG tablet Take 50 mg by mouth daily      buPROPion (WELLBUTRIN SR) 150 MG extended release tablet Take 1 tablet by mouth 2 times daily      Ascorbic Acid (SWEETIE-C PO) Take 1 tablet by mouth      cetirizine (ZYRTEC) 10 MG tablet Take 1 tablet by mouth daily 90 tablet 0    zinc gluconate 50 MG tablet Take 50 mg by mouth daily      magnesium 200 MG TABS tablet Take 200 mg by mouth daily      acetaminophen (TYLENOL 8 HOUR) 650 MG extended release tablet Take 650 mg by mouth every 8 hours as needed for Pain      vitamin D (ERGOCALCIFEROL) 1.25 MG (83270 UT) CAPS capsule TAKE 1 CAPSULE BY MOUTH EVERY 14 DAYS (Patient not taking: Reported on 6/28/2022) 6 capsule 5     Patient rhythm. Pulmonary:      Effort: Pulmonary effort is normal. No respiratory distress. Breath sounds: Normal breath sounds. No wheezing or rales. Chest:      Chest wall: No tenderness. Skin:     General: Skin is warm and dry. Neurological:      Mental Status: She is alert and oriented to person, place, and time. Coordination: Coordination normal.   Psychiatric:         Mood and Affect: Mood normal.       Assessment and Plan:  Jase Richard was seen today for 6 month follow-up and hypertension. Diagnoses and all orders for this visit:    Essential hypertension/PAF (paroxysmal atrial fibrillation) (HCC)Other hyperlipidemia  -    Blood pressure stable on the current regimen. Denies side effects. Asymptomatic.  - Continue with cardiology  - colestipol (COLESTID) 1 g tablet; TAKE 1 TABLET TWICE A DAY    Stressful life event affecting family   - Mood well controlled on current regimen without side effects. PHQ-9 is 6. Denies suicidal or homicidal ideation   - Continue current regimen. Denies need for refill. Prediabetes  -     POCT glycosylated hemoglobin (Hb A1C)-5.5  - Continue with lifestyle modifications  - Lifestyle modifications such as exercise, weight loss and healthy diet encouraged and reviewed with the pt. Seasonal allergic rhinitis due to other allergic trigger   - Well-controlled on the current regimen. Uncomplicated asthma, unspecified asthma severity, unspecified whether persistent   - Well-controlled per patient. Denies need for refill. Fibromyalgia   - Continue with rheumatology    Other specified hypothyroidism  -    Last TSH stable. Asymptomatic. Denies side effects  - Continue current regimen  - levothyroxine (SYNTHROID) 112 MCG tablet; TAKE 1 TABLET EVERY DAY-refilled today    Sleep apnea   - Continue with sleep medicine    Return in about 6 months (around 12/14/2022) for AWV and mood/HTN/TONY/asthma/TSH f/u, or sooner if needed.  Pt will call if symptoms worsen or fail to improve. All questions answered. Pt states no further questions or concerns at this time.    Electronically signed by: JOSE Matute - HERO 06/28/22

## 2022-07-07 ENCOUNTER — TELEPHONE (OUTPATIENT)
Dept: CARDIOLOGY CLINIC | Age: 62
End: 2022-07-07

## 2022-07-07 NOTE — TELEPHONE ENCOUNTER
Pt called stating she is having a total knee replacement surgery 8/25 by Piotr Tinsley MD with [facility], PCP is doing the EKG. Pt wants to know if she can get cardiac clearance and does she need to come to the office?     Pls advise thank you

## 2022-07-27 ENCOUNTER — OFFICE VISIT (OUTPATIENT)
Dept: INTERNAL MEDICINE CLINIC | Age: 62
End: 2022-07-27
Payer: MEDICARE

## 2022-07-27 ENCOUNTER — TELEPHONE (OUTPATIENT)
Dept: INTERNAL MEDICINE CLINIC | Age: 62
End: 2022-07-27

## 2022-07-27 VITALS
SYSTOLIC BLOOD PRESSURE: 126 MMHG | HEART RATE: 70 BPM | WEIGHT: 243 LBS | DIASTOLIC BLOOD PRESSURE: 84 MMHG | OXYGEN SATURATION: 98 % | BODY MASS INDEX: 41.71 KG/M2

## 2022-07-27 DIAGNOSIS — Z01.818 PRE-OP EXAMINATION: Primary | ICD-10-CM

## 2022-07-27 DIAGNOSIS — G47.39 OTHER SLEEP APNEA: ICD-10-CM

## 2022-07-27 DIAGNOSIS — I48.0 PAF (PAROXYSMAL ATRIAL FIBRILLATION) (HCC): ICD-10-CM

## 2022-07-27 DIAGNOSIS — I10 PRIMARY HYPERTENSION: ICD-10-CM

## 2022-07-27 DIAGNOSIS — R73.03 PREDIABETES: ICD-10-CM

## 2022-07-27 DIAGNOSIS — E78.49 OTHER HYPERLIPIDEMIA: ICD-10-CM

## 2022-07-27 DIAGNOSIS — E03.8 OTHER SPECIFIED HYPOTHYROIDISM: ICD-10-CM

## 2022-07-27 DIAGNOSIS — E66.01 CLASS 3 SEVERE OBESITY DUE TO EXCESS CALORIES WITHOUT SERIOUS COMORBIDITY WITH BODY MASS INDEX (BMI) OF 45.0 TO 49.9 IN ADULT (HCC): ICD-10-CM

## 2022-07-27 DIAGNOSIS — M17.11 PRIMARY OSTEOARTHRITIS OF RIGHT KNEE: ICD-10-CM

## 2022-07-27 PROCEDURE — 99214 OFFICE O/P EST MOD 30 MIN: CPT | Performed by: NURSE PRACTITIONER

## 2022-07-27 RX ORDER — CLOBETASOL PROPIONATE 0.5 MG/G
CREAM TOPICAL
COMMUNITY
Start: 2022-06-27

## 2022-07-27 SDOH — ECONOMIC STABILITY: FOOD INSECURITY: WITHIN THE PAST 12 MONTHS, YOU WORRIED THAT YOUR FOOD WOULD RUN OUT BEFORE YOU GOT MONEY TO BUY MORE.: NEVER TRUE

## 2022-07-27 SDOH — ECONOMIC STABILITY: FOOD INSECURITY: WITHIN THE PAST 12 MONTHS, THE FOOD YOU BOUGHT JUST DIDN'T LAST AND YOU DIDN'T HAVE MONEY TO GET MORE.: NEVER TRUE

## 2022-07-27 ASSESSMENT — SOCIAL DETERMINANTS OF HEALTH (SDOH): HOW HARD IS IT FOR YOU TO PAY FOR THE VERY BASICS LIKE FOOD, HOUSING, MEDICAL CARE, AND HEATING?: NOT HARD AT ALL

## 2022-07-27 ASSESSMENT — ENCOUNTER SYMPTOMS
CHEST TIGHTNESS: 0
SHORTNESS OF BREATH: 0
COUGH: 0
WHEEZING: 0

## 2022-07-27 NOTE — PROGRESS NOTES
7/27/2022    This is a 64 y.o. female   Chief Complaint   Patient presents with    Pre-op Exam     8/25 Dr. Paradise Doyle - Right knee @ [facility] -  forms - pt. States cardiology is doing EKG at her visit      HPI     Hank Nguyen is a 64 y.o.  female who comes for a preoperative exam.  She is referred by Dr. Alfonso Rod for perioperative risk determination for upcoming surgery for right total knee replacement on 8/25/2022. Denies any previous complications with anesthesia. Pt has an appt with preadmission testing for joint class and blood work scheduled on 8/4/2022 at [facility]. She did well with her left total knee in 2017. Cardiac clearance and EKG is scheduled with Dr. Umang Dobbs on 8/15/2022. She is on xarelto with cardiology for afib. Denies taking any asa, fish oil, NSAIDs or herbals    Past Medical History:   Diagnosis Date    A-fib ([facility] [address]     Anxiety     Asthma     Back pain     Depression     Fibromyalgia     Hyperlipidemia     Hypertension     Migraines     Other specified hypothyroidism 05/05/2021    PKU (phenylketonuria) ([facility] [address]     Prediabetes     Unspecified sleep apnea     Urinary incontinence      Past Surgical History:   Procedure Laterality Date    BREAST REDUCTION SURGERY      COLONOSCOPY N/A 11/27/2019    COLONOSCOPY POLYPECTOMY SNARE/COLD BIOPSY performed by Kay Panda MD at [address] ([facility])      KNEE SURGERY Left 2017    SHOULDER ARTHROPLASTY Right     2019    TONSILLECTOMY  1968    TUBAL LIGATION       Social History     Socioeconomic History    Marital status:      Spouse name: Not on file    Number of children: Not on file    Years of education: Not on file    Highest education level: Not on file   Occupational History    Not on file   Tobacco Use    Smoking status: Never    Smokeless tobacco: Never   Vaping Use    Vaping Use: Never used   Substance and Sexual Activity    Alcohol use: Not Currently    Drug use:  No Sexual activity: Not Currently   Other Topics Concern    Not on file   Social History Narrative    Retired- on disability. Enjoys swimming and singing. . No children. Degree in elementary and  education.      Social Determinants of Health     Financial Resource Strain: Low Risk     Difficulty of Paying Living Expenses: Not hard at all   Food Insecurity: No Food Insecurity    Worried About Running Out of Food in the Last Year: Never true    Ran Out of Food in the Last Year: Never true   Transportation Needs: Not on file   Physical Activity: Not on file   Stress: Not on file   Social Connections: Not on file   Intimate Partner Violence: Not on file   Housing Stability: Not on file     Family History   Problem Relation Age of Onset    Cancer Other     Cancer Other     Cancer Maternal Uncle         melanoma    Arthritis Mother     Diabetes Mother     Heart Disease Mother         a-fib    COPD Mother     Cancer Mother         skin    Ovarian Cancer Mother     Stroke Mother     COPD Father     Arthritis Father     Cancer Father         lung, prostate    COPD Brother     Stroke Brother     Glaucoma Maternal Grandmother     Breast Cancer Maternal Grandmother         breast    Heart Attack Neg Hx        Current Outpatient Medications   Medication Sig Dispense Refill    clobetasol (TEMOVATE) 0.05 % cream       triamcinolone (KENALOG) 0.1 % ointment       albuterol sulfate HFA (PROVENTIL;VENTOLIN;PROAIR) 108 (90 Base) MCG/ACT inhaler Inhale 2 puffs into the lungs every 6 hours as needed for Wheezing      colestipol (COLESTID) 1 g tablet TAKE 1 TABLET TWICE A  tablet 1    levothyroxine (SYNTHROID) 112 MCG tablet TAKE 1 TABLET EVERY DAY 90 tablet 1    DULoxetine (CYMBALTA) 60 MG extended release capsule Take 1 capsule by mouth daily 90 capsule 1    montelukast (SINGULAIR) 10 MG tablet TAKE 1 TABLET EVERY NIGHT 90 tablet 1    rivaroxaban (XARELTO) 20 MG TABS tablet Take 1 tablet by mouth Daily with supper 90 tablet 3    naltrexone (DEPADE) 50 MG tablet Take 50 mg by mouth daily      buPROPion (WELLBUTRIN SR) 150 MG extended release tablet Take 1 tablet by mouth 2 times daily      cetirizine (ZYRTEC) 10 MG tablet Take 1 tablet by mouth daily 90 tablet 0    acetaminophen (TYLENOL) 650 MG extended release tablet Take 650 mg by mouth every 8 hours as needed for Pain       No current facility-administered medications for this visit. Allergies   Allergen Reactions    Latex Rash    Prednisone      Other reaction(s): Per patient-blow up    Statins Nausea Only     Office Visit on 06/28/2022   Component Date Value Ref Range Status    Hemoglobin A1C 06/28/2022 5.5  % Final     Review of Systems   Constitutional:  Negative for chills, fatigue and fever. Respiratory:  Negative for cough, chest tightness, shortness of breath and wheezing. Cardiovascular:  Negative for chest pain, palpitations and leg swelling. Neurological:  Negative for dizziness, tremors, light-headedness and headaches. /84   Pulse 70   Wt 243 lb (110.2 kg)   SpO2 98%   BMI 41.71 kg/m²     Physical Exam  Vitals reviewed. Constitutional:       General: She is not in acute distress. Appearance: She is well-developed. She is obese. She is not ill-appearing or diaphoretic. HENT:      Head: Normocephalic and atraumatic. Cardiovascular:      Rate and Rhythm: Normal rate and regular rhythm. Heart sounds: Normal heart sounds. Pulmonary:      Effort: Pulmonary effort is normal. No respiratory distress. Breath sounds: Normal breath sounds. No rhonchi. Skin:     General: Skin is warm and dry. Neurological:      General: No focal deficit present. Mental Status: She is alert and oriented to person, place, and time.    Psychiatric:         Mood and Affect: Mood and affect normal.         Behavior: Behavior normal.     Diagnosis  Assessment and Plan  Pre-op examination  Perioperative risk related to the patient's upcoming surgery is considered acceptable. Pt is medically cleared for surgery pending cardiology clearance and lab work ordered to complete at preadmission testing. DVT prophylaxis per surgery team.  Requested work up: to be complete with preadmission testing. Pre-op exam was completed on 7/27/22.      Primary osteoarthritis of right knee  Chronic, uncontrolled   Continue with planned surgery - see above     Prediabetes/ Other specified hypothyroidism/ Other hyperlipidemia/ Primary hypertension/ PAF (paroxysmal atrial fibrillation) (HCC)/ Class 3 severe obesity due to excess calories without serious comorbidity with body mass index (BMI) of 45.0 to 49.9 in adult (HCC)/ Other sleep apnea  Chronic, controlled on current regimen   Continue with plan to see cardiology for cardiac clearance - will plan to hold xarelto per cardiology     FU with PCP when due for CC     Electronically signed by JOSE Jo CNP on 7/27/2022 at 12:35 PM

## 2022-07-27 NOTE — TELEPHONE ENCOUNTER
----- Message from Tammy Hale sent at 7/27/2022 10:00 AM EDT -----  Subject: Message to Provider    QUESTIONS  Information for Provider? Pt need a call back to clarify where certain dr Minerva Olivas is location for further information. Wants a call back to discuss. ---------------------------------------------------------------------------  --------------  Bridger Luo [facility]  [phone]; OK to leave message on voicemail  ---------------------------------------------------------------------------  --------------  SCRIPT ANSWERS  Relationship to Patient?  Self

## 2022-08-03 NOTE — PROGRESS NOTES
[address]  8/15/22  Referring: Anju MENDOZA    REASON FOR CONSULT/CHIEF COMPLAINT/HPI     Reason for visit/ Chief complaint   Pre Operative Clearance for a RTK replacement    HPI Bonny Damon is a 64 y. o.female here today for pre operative clearance for a right total knee replacement with Dr Joe Perkins She has been seen by EP previously by our practice. PMH of HTN, HLD, fibromyalgia, TONY, asthma, and atrial fibrillation. Wore a monitor that showed 25% AF burden and elected for ablation. S/p RFCA of afib w/ PVI and typical flutter (10/2/2020). Since she had her ablation, she has had no cardiac symptoms. She is only limited by knee pain, but has no shortness of breath or chest pain with any activity. Of note, in 2019 she had a CTA demonstrating totally normal coronary arteries with a calcium score of zero. Patient is adherent with medications and is tolerating them well without side effects     HISTORY/ALLERGIES/ROS     MedHx:  has a past medical history of A-fib ([facility] [address] Anxiety, Asthma, Back pain, Depression, Fibromyalgia, Hyperlipidemia, Hypertension, Migraines, Other specified hypothyroidism, PKU (phenylketonuria) ([address] Prediabetes, Unspecified sleep apnea, and Urinary incontinence. SurgHx:  has a past surgical history that includes Hysterectomy; Tubal ligation; Tonsillectomy (1968); knee surgery (Left, 2017); Colonoscopy (N/A, 11/27/2019); cyst removal (1960); Breast reduction surgery; and Total shoulder arthroplasty (Right). SocHx:  reports that she has never smoked. She has never used smokeless tobacco. She reports current alcohol use. She reports that she does not use drugs. FamHx: No family history of premature coronary artery disease, sudden death, or aneurysm  Allergies: Latex, Prednisone, and Statins     MEDICATIONS      Prior to Admission medications    Medication Sig Start Date End Date Taking?  Authorizing Provider   clobetasol (TEMOVATE) 0.05 % cream  6/27/22 Yes Historical Provider, MD   triamcinolone (KENALOG) 0.1 % ointment  7/25/22  Yes Historical Provider, MD   albuterol sulfate HFA (PROVENTIL;VENTOLIN;PROAIR) 108 (90 Base) MCG/ACT inhaler Inhale 2 puffs into the lungs every 6 hours as needed for Wheezing   Yes Historical Provider, MD   colestipol (COLESTID) 1 g tablet TAKE 1 TABLET TWICE A DAY 6/28/22  Yes JOSE Francois CNP   levothyroxine (SYNTHROID) 112 MCG tablet TAKE 1 TABLET EVERY DAY 6/28/22  Yes JOSE Francois CNP   DULoxetine (CYMBALTA) 60 MG extended release capsule Take 1 capsule by mouth daily 6/3/22  Yes JOSE Francois CNP   montelukast (SINGULAIR) 10 MG tablet TAKE 1 TABLET EVERY NIGHT 6/3/22  Yes JOSE Francois CNP   rivaroxaban (XARELTO) 20 MG TABS tablet Take 1 tablet by mouth Daily with supper 4/29/22  Yes JOSE Hand CNP   cetirizine (ZYRTEC) 10 MG tablet Take 1 tablet by mouth daily 4/6/21  Yes JOSE Francois CNP   acetaminophen (TYLENOL) 650 MG extended release tablet Take 650 mg by mouth every 8 hours as needed for Pain   Yes Historical Provider, MD   naltrexone (DEPADE) 50 MG tablet Take 50 mg by mouth daily  Patient not taking: Reported on 8/15/2022 10/29/21   Historical Provider, MD   buPROPion [facility]) 150 MG extended release tablet Take 1 tablet by mouth 2 times daily  Patient not taking: Reported on 8/15/2022 11/17/21   Historical Provider, MD       PHYSICAL EXAM        Vitals:    08/15/22 1006   BP: 132/86   Pulse: 73   SpO2: 97%    Weight: 247 lb 1.6 oz (112.1 kg)     Gen Alert, cooperative, no distress Heart  Regular rate and rhythm, no murmur   Head Normocephalic, atraumatic, no abnormalities Abd  Soft, NT, +BS, no mass, no OM   Eyes PERRLA, conj/corn clear Ext  Ext nl, AT, no C/C, no edema   Nose Nares normal, no drain age, Non-tender Pulse 2+ and symmetric   Throat Lips, mucosa, tongue normal Skin Color/text/turg nl, no rash/lesions   Neck S/S, TM, NT, no bruit Psych Nl mood and affect   Lung CTA-B, unlabored, no DTP     Ch wall NT, no deform       LABS and Imaging     Relevant and available CV data reviewed    EKG personally interpreted: 8/10/21 SR, Low Voltage in precordial leads, RSR(VI)- non diagnostic, non specific T wave abnormality     NATALIA 10/2/20  Overall left ventricular function is normal.   The left atrial size is normal.   There is no evidence of mass or thrombus in the left atrium or appendage. CT Angiogram Cardiac 3/18/19 [facility]  Total calcium score is 0 which corresponds to no identifiable coronary calcification and less than 10th percentile for age and sex     Normal CT angiogram of coronary arteries   Pericardium:  Grossly unremarkable   Left atrium:  Grossly unremarkable   Left ventricle:  Grossly unremarkable   Right atrium:  Grossly unremarkable   Right ventricle:  Grossly unremarkable   Mitral valve:  Grossly unremarkable   Aortic valve:  Grossly unremarkable     CORONARY ARTERIES:   Dominance:  Left   Left main:  Unremarkable   LAD and diagonals:  Unremarkable   LCx and OM:  Unremarkable   RCA:  Unremarkable   There is no evidence of myocardial bridge, coronary aneurysm, or coronaryartery anomaly     Stress:12/28/18 [facility]  The LV EF is 60%   Normal global and regional wall motion in all territories. There is a small sized, fixed defect in the anteroseptal wall   consistent with mild infarct. There is a small size, fixed defect in the anteroseptal wall   consistent with breast attenuation. 1.Non-diagnostic ECG for ischemia with pharmocologic stress. 2.Negative nuclear stress imaging for inducible ischemia.         30 Day Holter:  6/3/20 PAF, Afib RVR    Outside/Care everywhere records Reviewed  Labs Reviewed  Prior Imaging, ekg, cath, echo reviewed when available  Medications reviewed  Old Notes reviewed  ASSESSMENT AND PLAN     Pre Operative clearance for a right total knee with  Donald 8/25/22  Primary osteoarthritis of right knee  Plan:    Low-risk for cardiac complications. No need for additional cardiac testing prior to knee replacement    2. PAF  [id number] score 2  On Xarelto 20 mg daily   S/p RFCA of afib w/ PVI and typical flutter (10/2/2020)  Plan: defer to EP team how long she will need to stay on Xarelto; follow-up with EP as scheduled    3. HTN  BP Today  Plan: well-controlled    4 . Hyperlipidemia  1/28/22 Lipid Panel   HDL 48   States she tried 3-4 statins and \"they made me crazy. \" Not interested in stains. Colestid 1 g daily  Plan:   Patient counseled on lifestyle modification, diet, and exercise. 5. Morbid Obesity/TONY  CPAP she is waiting on a new CPAP  BMI 41.71  6/28/22 A1c 5.5    Follow Up: Return if symptoms worsen or fail to improve. Ananya Lin MD  Cardiologist [facility] [place], operated by [facility]    Scribe's Attestation: This note was scribed in the presence of Dr. Aarti Segundo MD by Umu Martines RN. 08/15/22     Physician Attestation  The scribe wrote this note in the presence of me Ananya Lin MD). The scribe may have prepopulated components of this note with my historical  intellectual property under my direct supervision. Any additions to this intellectual property were performed in my presence and at my direction. Furthermore, the content and accuracy of this note have been reviewed by me with edits by me as needed.   Ananya Lin MD 8/15/2022 10:58 AM

## 2022-08-12 ENCOUNTER — TELEPHONE (OUTPATIENT)
Dept: INTERNAL MEDICINE CLINIC | Age: 62
End: 2022-08-12

## 2022-08-12 LAB
AVERAGE GLUCOSE: NORMAL
HBA1C MFR BLD: 5.8 %

## 2022-08-12 NOTE — TELEPHONE ENCOUNTER
Siva Kim with [facility] is requesting a copy of pre op exam.  She asked that patient name and date of birth be on each page and faxed to her at #[phone].

## 2022-08-12 NOTE — TELEPHONE ENCOUNTER
It looks like patient seen Abelardo Murphy at the end of July for the pre-op. Do you have this form to fax?

## 2022-08-15 ENCOUNTER — TELEPHONE (OUTPATIENT)
Dept: CARDIOLOGY CLINIC | Age: 62
End: 2022-08-15

## 2022-08-15 ENCOUNTER — OFFICE VISIT (OUTPATIENT)
Dept: CARDIOLOGY CLINIC | Age: 62
End: 2022-08-15
Payer: MEDICARE

## 2022-08-15 VITALS
HEIGHT: 64 IN | BODY MASS INDEX: 42.18 KG/M2 | DIASTOLIC BLOOD PRESSURE: 86 MMHG | HEART RATE: 73 BPM | OXYGEN SATURATION: 97 % | WEIGHT: 247.1 LBS | SYSTOLIC BLOOD PRESSURE: 132 MMHG

## 2022-08-15 DIAGNOSIS — G47.39 OTHER SLEEP APNEA: ICD-10-CM

## 2022-08-15 DIAGNOSIS — E78.49 OTHER HYPERLIPIDEMIA: ICD-10-CM

## 2022-08-15 DIAGNOSIS — Z01.818 PRE-OPERATIVE CLEARANCE: Primary | ICD-10-CM

## 2022-08-15 DIAGNOSIS — E66.01 CLASS 3 SEVERE OBESITY DUE TO EXCESS CALORIES WITHOUT SERIOUS COMORBIDITY WITH BODY MASS INDEX (BMI) OF 45.0 TO 49.9 IN ADULT (HCC): ICD-10-CM

## 2022-08-15 DIAGNOSIS — I48.0 PAF (PAROXYSMAL ATRIAL FIBRILLATION) (HCC): ICD-10-CM

## 2022-08-15 DIAGNOSIS — I10 PRIMARY HYPERTENSION: ICD-10-CM

## 2022-08-15 PROCEDURE — 99203 OFFICE O/P NEW LOW 30 MIN: CPT | Performed by: INTERNAL MEDICINE

## 2022-08-15 PROCEDURE — 93000 ELECTROCARDIOGRAM COMPLETE: CPT | Performed by: INTERNAL MEDICINE

## 2022-08-15 NOTE — TELEPHONE ENCOUNTER
Pt. Is going to stop Xarelto this Friday. [facility] wanted to verify with [facility]XA if this would be ok. Please advise. Thank you.

## 2022-08-15 NOTE — PATIENT INSTRUCTIONS
Ok to proceed with your knee surgery as scheduled.   No need for further cardiac workup prior to knee surgery    F/u with EP team as needed for atrial fibrillation

## 2022-08-16 ENCOUNTER — TELEPHONE (OUTPATIENT)
Dept: CARDIOLOGY CLINIC | Age: 62
End: 2022-08-16

## 2022-08-16 NOTE — TELEPHONE ENCOUNTER
Prem [facility] called in requesting the office visit notes from 8/15 as well as the EKG tracings from 8/15. For any questions call Prem Body @[phone].  Please fax requested paperwork to [phone]

## 2022-08-16 NOTE — TELEPHONE ENCOUNTER
CARDIAC CLEARANCE     What type of procedure are you having? Right Total Knee Replacement    Which physician is performing your procedure? Dr. Majano Draft    When is your procedure scheduled for? 08/25    Where are you having this procedure? [facility]    Are you taking Blood Thinners? Yes   If so what? (Name/dose/frequesncy)  Xarelto - 20mg    Does the surgeon want you to stop your blood thinner? If so for how long? Wants  [facility] opinion    Phone Number and Contact Name for Physicians office:   Sergio Melendrez  - [phone]  Fax number to send information: [phone]    NOTE:  Tay Vernon is requesting the Pt office notes, EKG and whatever else happen to be fax over to her. Please advise.   Thank you

## 2022-08-16 NOTE — LETTER
[facility] CARDIOLOGY-[address]  Dept: [phone]  Dept Fax: [phone]      2022    Patient:Beronica Damian  : 1960  DOS: 2022    To Whom it May Concern:    Hank Nguyen has been evaluated for cardiac clearance. Hank Nguyen is considered at a low cardiac risk for surgery. She can hold Xarelto for 2 days prior to surgery. Resume after surgery when surgeon feels comfortable restarting. There is no further cardiac testing that could be done to lower the risk. Please let my office know if you have any questions or concerns.           Zenobia Denson MD                           [facility] serving [place] and [place]

## 2022-08-17 ENCOUNTER — OFFICE VISIT (OUTPATIENT)
Dept: RHEUMATOLOGY | Age: 62
End: 2022-08-17
Payer: MEDICARE

## 2022-08-17 VITALS
BODY MASS INDEX: 42.17 KG/M2 | DIASTOLIC BLOOD PRESSURE: 76 MMHG | SYSTOLIC BLOOD PRESSURE: 130 MMHG | WEIGHT: 247 LBS | HEIGHT: 64 IN | HEART RATE: 64 BPM

## 2022-08-17 DIAGNOSIS — M79.7 FIBROMYALGIA: Primary | ICD-10-CM

## 2022-08-17 PROCEDURE — 99213 OFFICE O/P EST LOW 20 MIN: CPT | Performed by: INTERNAL MEDICINE

## 2022-08-17 NOTE — PROGRESS NOTES
Subjective:       Ricardo Hurd is a 64 y.o. female returns for follow-up of fibromyalgia. She is scheduled later this month for total knee replacement. She continues on Cymbalta 60 mg daily and is feeling well      Review of Systems:    Myalgias improved. Sleep improved    Objective:     /76   Pulse 64   Ht 5' 4\" (1.626 m)   Wt 247 lb (112 kg)   BMI 42.40 kg/m²   Alert female no acute distress. Minimal tender points in a distribution consistent with fibromyalgia    Assessment:      Fibromyalgia    Plan: Will continue on Cymbalta and I will see her back in 1 years time.           Jenny Choudhury MD, MD, 8/17/2022 11:48 AM

## 2022-08-18 NOTE — TELEPHONE ENCOUNTER
Ov 8/15  Pre Operative clearance for a right total knee with Dr Alfonso Rod 8/25/22  Primary osteoarthritis of right knee  Plan:     Low-risk for cardiac complications. No need for additional cardiac testing prior to knee replacement      Surgeon wants your opinion on how long to hold Xarelto.

## 2022-08-18 NOTE — TELEPHONE ENCOUNTER
Faxed letter that pt may proceed with low cardiac risk for surgery. May hold Xarelto for 2 days prior and resume when felt safe from surgeon standpoint.

## 2022-08-19 ENCOUNTER — TELEPHONE (OUTPATIENT)
Dept: CARDIOLOGY CLINIC | Age: 62
End: 2022-08-19

## 2022-08-19 NOTE — TELEPHONE ENCOUNTER
Spoke with the patient and advised her of the message below . She voiced understanding . 3 sample bottles of Xarelto 20 mg placed upfront for patient to pickup .  Pt aware    LOT #: [id number] ; [id number]  EXP : 04/24 ; 02/25

## 2022-08-19 NOTE — TELEPHONE ENCOUNTER
Pt called requesting a appt with [facility] after her knee surg (8/25/22) to discuss stopping Xarelto completely. Please see and advise.

## 2022-08-24 RX ORDER — DULAGLUTIDE 0.75 MG/.5ML
INJECTION, SOLUTION SUBCUTANEOUS
COMMUNITY
Start: 2022-08-18 | End: 2022-10-05 | Stop reason: ALTCHOICE

## 2022-08-24 NOTE — TELEPHONE ENCOUNTER
I spoke with pt. She stated that her surgery is tomorrow. She held since Sunday because 2 different answers. I told her that we attempted to contact with her answer and she stated that she doesn't answer her phone for unknown numbers.

## 2022-09-14 PROBLEM — Z01.818 PRE-OPERATIVE CLEARANCE: Status: RESOLVED | Noted: 2022-08-15 | Resolved: 2022-09-14

## 2022-10-03 NOTE — PROGRESS NOTES
[facility]   Electrophysiology Follow up   Date: 10/5/2022  I had the privilege of visiting Odin Branch in the office. CC: Atrial fibrillation  HPI: Odin Branch is a 58 y.o. female with a PMH of PAF, anxiety, asthma, HLD, depression, slep apnea and HTN. She went to urgent care and her ecg showed atrial fibrillation. A monitor was ordered which revealed afib RVR 25% burden     S/p afib ablation 10/2/2020     10/08/2020 presents to the office for urgent appointment. She feels at night that her blood pressure and heart rate are elevated at night time the 150 range. Her BP and heart rate are well controlled today though she has frequent PAC's. We discussed placing a holter monitor to determine what rhythm she is having. Holter Monitor 10/08/2020 to 10/12/2020 Showed Atrial fibrillation burden 26.5% atrial flutter burden  0.9%. Rate controlled. Event monitor 3/30/2021-4/28/2021 showed brief atrial run 11 seconds, no AF, and avg HR 69 (). Patient was last seen by EP 08/2021     Interval History: Josias Soto presents today in follow up. She is recovering from Right total knee replacement  08/26/2022. She has been out her xarelto and has not taken for 2-3 weeks. She monitors her heart rate and BP at home. She states she usually can tell when she has not had any afib and she has not felt any symptoms       Assessment and plan:   Paroxysmal Atrial fibrillation   EKG Today :  Sinus Rhythm    S/p RFCA afib with PCI and typical flutter 10/02/2020   Toprol discontinued 12/10/2021 - due to non compliance    - [id number] score: 2 (gender, HTN) ; [id number] Vasc score and anticoagulation discussed. High risk for stroke and thromboembolism. Anticoagulation is recommended.   ~ On Xarelto 20 mg daily, no s/s of bleeding - patient has not taken for two weeks. - Afib risk factors including age, HTN, obesity, inactivity and TONY were discussed with patient.  Risk factor modification recommended  - will repeat 30 day  monitor to check burden. And determine need for DOAC    - we discussed long term monitoring with loop recorder. She has already stopped Xarelto a few weeks ago and did not get new prescription due to cost issues. She has not had any known recurrence of atrial fibrillation. Her [id number] score is only 2 based on female and history of hypertension. I suggested that we do another monitor to see if there is any episodes of A. fib. In addition to that I asked her to continue to monitor herself for any potential recurrence of A. fib. If we observe any evidence of A. fib, resumption of anticoagulation is recommended. I also explained to her that there is no easy way to know if she is going to have a recurrence that could put her at risk of embolic events. Continue lifestyle modifications    Sinus Bradycardia    - did not tolerate flecainide in the past due to bradycardia    HTN  -borderline 138/80   -BP goal <130/80  -Home BP monitoring encouraged, printed information provided on how to accurately measure BP at home.  -Counseled to follow a low salt diet to assure blood pressure remains controlled for cardiovascular risk factor modification.   -The patient is counseled to get regular exercise 3-5 times per week and maintain a healthy weight reduce cardiovascular risk factors. Obesity  Body mass index is 41.2 kg/m². -Excessive weight is complicating assessment and treatment.  It is placing patient at risk for multiple co-morbidities as well as early death and contributing to the patient's presentation.  -Discussed weight management with diet and exercise   TONY  -Stable: Uses CPAP/Bipap/APAP  -Encourage to use machine to prevent long term effects of untreated TONY       Plan:   30 day monitor  Continue to monitor your rate and rhythm at home   Follow up in one year     Patient Active Problem List    Diagnosis Date Noted    Other specified hypothyroidism 05/05/2021    PAF ([facility]oxysmal [facility] fibrillation) ([facility] 07/16/2020    Prediabetes     Depression     Asthma     Fibromyalgia     Skin lesion of left leg 09/26/2016    Intractable chronic migraine without aura and with status migrainosus 09/26/2016    Peripheral polyneuropathy 07/26/2016    Neurogenic claudication due to lumbar spinal stenosis 07/26/2016    Osteoarthritis of spine with radiculopathy, lumbar region 07/26/2016    Balance problem 07/26/2016    Class 3 severe obesity due to excess calories without serious comorbidity with body mass index (BMI) of 45.0 to 49.9 in adult [facility]) 02/03/2015    Hypertension 01/20/2015    Hyperlipidemia 01/20/2015    Back pain 01/20/2015    Sleep apnea 01/20/2015    Urinary incontinence 01/20/2015     Diagnostic studies:  ECG 10/5/22  72 SR   415  QTcH, 102 QRS    NATALIA 10/02/2020    Summary   Overall left ventricular function is normal.   The left atrial size is normal.   There is no evidence of mass or thrombus in the left atrium or appendage. Event monitor 3/30/2021-4/28/2021 showed brief atrial run 11 seconds, no AF, and avg HR 69 (). Coronary CT angio  02/04/2021   IMPRESSION      Total calcium score is 0 which corresponds to no identifiable coronary calcification and less than 10th percentile for age and sex    Normal CT angiogram of coronary arteries     Echo: 12/28/2018  Summary:  The left ventricular wall motion is normal.  Overall left ventricular ejection fraction is estimated to be 55-60%. Right ventricular systolic pressure is indeterminate due to poorly  visualized tricuspid regurgitation. Poor endocardial border visualization  There is borderline concentric left ventricular hypertrophy            I independently reviewed the cardiac diagnostic studies, ECG and relevant imaging studies.      No results found for: LVEF, LVEFMODE  Lab Results   Component Value Date    TSHFT4 2.97 10/16/2019    TSH 0.72 01/28/2022         Physical Examination:  Vitals:    10/05/22 1531   BP: 138/80 Pulse: 75   SpO2: 91%      Wt Readings from Last 3 Encounters:   10/05/22 240 lb (108.9 kg)   08/17/22 247 lb (112 kg)   08/15/22 247 lb 1.6 oz (112.1 kg)       Constitutional: Oriented. No distress. Head: Normocephalic and atraumatic. Mouth/Throat: Oropharynx is clear and moist.   Eyes: Conjunctivae normal. EOM are normal.   Neck: Neck supple. No rigidity. No JVD present. Cardiovascular: Normal rate, regular rhythm, S1&S2. Pulmonary/Chest: Bilateral respiratory sounds. No wheezes, No rhonchi. Abdominal: Soft. Bowel sounds present. No distension, No tenderness. Musculoskeletal: No tenderness. No edema    Lymphadenopathy: Has no cervical adenopathy. Neurological: Alert and oriented. Cranial nerve appears intact, No Gross deficit   Skin: Skin is warm and dry. No rash noted. Psychiatric: Has a normal behavior       Review of System:  [x] Full ROS obtained and negative except as mentioned in HPI    Prior to Admission medications    Medication Sig Start Date End Date Taking?  Authorizing Provider   clobetasol (TEMOVATE) 0.05 % cream  6/27/22  Yes Historical Provider, MD   triamcinolone (KENALOG) 0.1 % ointment  7/25/22  Yes Historical Provider, MD   albuterol sulfate HFA (PROVENTIL;VENTOLIN;PROAIR) 108 (90 Base) MCG/ACT inhaler Inhale 2 puffs into the lungs every 6 hours as needed for Wheezing   Yes Historical Provider, MD   colestipol (COLESTID) 1 g tablet TAKE 1 TABLET TWICE A DAY 6/28/22  Yes JOSE Griffin CNP   levothyroxine (SYNTHROID) 112 MCG tablet TAKE 1 TABLET EVERY DAY 6/28/22  Yes JOSE Griffin CNP   DULoxetine (CYMBALTA) 60 MG extended release capsule Take 1 capsule by mouth daily 6/3/22  Yes JOSE Griffin CNP   montelukast (SINGULAIR) 10 MG tablet TAKE 1 TABLET EVERY NIGHT 6/3/22  Yes JOSE Griffin CNP   rivaroxaban (XARELTO) 20 MG TABS tablet Take 1 tablet by mouth Daily with supper 4/29/22  Yes JOSE Aden CNP   naltrexone (DEPADE) 50 MG tablet Take 50 mg by mouth daily 10/29/21  Yes Historical Provider, MD   buPROPion [facility] SR) 150 MG extended release tablet Take 1 tablet by mouth 2 times daily 11/17/21  Yes Historical Provider, MD   cetirizine (ZYRTEC) 10 MG tablet Take 1 tablet by mouth daily 4/6/21  Yes Orleivy Labrum, APRN - CNP   acetaminophen (TYLENOL) 650 MG extended release tablet Take 650 mg by mouth every 8 hours as needed for Pain   Yes Historical Provider, MD       Allergies   Allergen Reactions    Latex Rash    Prednisone      Other reaction(s): Per patient-blow up    Statins Nausea Only       Social History:  Reviewed. reports that she has never smoked. She has never used smokeless tobacco. She reports current alcohol use. She reports that she does not use drugs. Family History:  Reviewed. Reviewed. No family history of SCD. Relevant and available labs, and cardiovascular diagnostics reviewed. Reviewed. I independently reviewed relevant and available cardiac diagnostic tests ECG, CXR, Echo, Stress test, Device interrogation, Holter, CT scan. Outside medical records via Care everywhere reviewed and summarized in H&P above. Complex medical condition with multiple medical problems affecting prognosis and outcome of EP interventions       - The patient is counseled to follow a low salt diet to assure blood pressure remains controlled for cardiovascular risk factor modification.   - The patient is counseled to avoid excess caffeine, and energy drinks as this may exacerbated ectopy and arrhythmia. - The patient is counseled to get regular exercise 3-5 times per week to control cardiovascular risk factors. - The patient is counseled to lose weigt to control cardiovascular risk factors. All questions and concerns were addressed to the patient/family. Alternatives to my treatment were discussed.  I have discussed the above stated plan and the patient verbalized understanding and agreed with the plan. Scribe attestation: This note was scribed in the presence of  Antonio Chen MD by Lion Avelar RN    I, Dr. Antonio Chen personally performed the services described in this documentation as scribed by RN in my presence, and it is both accurate and complete. NOTE: This report was transcribed using voice recognition software. Every effort was made to ensure accuracy, however, inadvertent computerized transcription errors may be present.      Antonio Chen MD, [address]   Office: [phone]  Fax: [phone]

## 2022-10-05 ENCOUNTER — OFFICE VISIT (OUTPATIENT)
Dept: CARDIOLOGY CLINIC | Age: 62
End: 2022-10-05
Payer: MEDICARE

## 2022-10-05 VITALS
DIASTOLIC BLOOD PRESSURE: 80 MMHG | WEIGHT: 240 LBS | BODY MASS INDEX: 40.97 KG/M2 | SYSTOLIC BLOOD PRESSURE: 138 MMHG | HEART RATE: 75 BPM | OXYGEN SATURATION: 91 % | HEIGHT: 64 IN

## 2022-10-05 DIAGNOSIS — G47.33 OSA (OBSTRUCTIVE SLEEP APNEA): ICD-10-CM

## 2022-10-05 DIAGNOSIS — I48.0 PAF (PAROXYSMAL ATRIAL FIBRILLATION) (HCC): Primary | ICD-10-CM

## 2022-10-05 DIAGNOSIS — E66.01 MORBID OBESITY WITH BMI OF 40.0-44.9, ADULT (HCC): ICD-10-CM

## 2022-10-05 DIAGNOSIS — I10 PRIMARY HYPERTENSION: ICD-10-CM

## 2022-10-05 PROCEDURE — 99214 OFFICE O/P EST MOD 30 MIN: CPT | Performed by: INTERNAL MEDICINE

## 2022-10-05 PROCEDURE — 93000 ELECTROCARDIOGRAM COMPLETE: CPT | Performed by: INTERNAL MEDICINE

## 2022-10-25 ENCOUNTER — TELEPHONE (OUTPATIENT)
Dept: RHEUMATOLOGY | Age: 62
End: 2022-10-25

## 2022-10-25 DIAGNOSIS — M79.7 FIBROMYALGIA: Primary | ICD-10-CM

## 2022-10-25 RX ORDER — GABAPENTIN 300 MG/1
300 CAPSULE ORAL NIGHTLY
Qty: 90 CAPSULE | Refills: 0 | Status: SHIPPED | OUTPATIENT
Start: 2022-10-25 | End: 2023-01-23

## 2022-10-25 NOTE — TELEPHONE ENCOUNTER
Patient called asking to go back on Gabapentin 300mg. She had an old script on hand from 2020. She restarted it at night. She is asking to get a 90 day supply for 1 HS sent to the pharmacy. She has not needed it for a long time. She started having a lot of body pain at night and was unable to sleep. It seems to help her taking it at night. Shad Vo.

## 2022-11-30 ENCOUNTER — TELEPHONE (OUTPATIENT)
Dept: CARDIOLOGY CLINIC | Age: 62
End: 2022-11-30

## 2022-12-02 ENCOUNTER — TELEMEDICINE (OUTPATIENT)
Dept: INTERNAL MEDICINE CLINIC | Age: 62
End: 2022-12-02
Payer: MEDICARE

## 2022-12-02 DIAGNOSIS — R05.1 ACUTE COUGH: Primary | ICD-10-CM

## 2022-12-02 DIAGNOSIS — J02.9 SORE THROAT: ICD-10-CM

## 2022-12-02 DIAGNOSIS — R09.81 NASAL CONGESTION: ICD-10-CM

## 2022-12-02 PROCEDURE — 99213 OFFICE O/P EST LOW 20 MIN: CPT | Performed by: NURSE PRACTITIONER

## 2022-12-02 RX ORDER — METHYLPREDNISOLONE 4 MG/1
TABLET ORAL
Qty: 1 KIT | Refills: 0 | Status: SHIPPED | OUTPATIENT
Start: 2022-12-02 | End: 2022-12-08

## 2022-12-02 RX ORDER — AMOXICILLIN AND CLAVULANATE POTASSIUM 875; 125 MG/1; MG/1
1 TABLET, FILM COATED ORAL 2 TIMES DAILY
Qty: 10 TABLET | Refills: 0 | Status: SHIPPED | OUTPATIENT
Start: 2022-12-02 | End: 2022-12-07

## 2022-12-02 ASSESSMENT — ENCOUNTER SYMPTOMS
COUGH: 1
SINUS PRESSURE: 1
RHINORRHEA: 1
DIARRHEA: 0
SORE THROAT: 1
TROUBLE SWALLOWING: 0
ABDOMINAL PAIN: 0
EYE PAIN: 0
VOMITING: 0
WHEEZING: 0
NAUSEA: 0
SHORTNESS OF BREATH: 0
CONSTIPATION: 0

## 2022-12-02 NOTE — PATIENT INSTRUCTIONS
Increase fluid water intake  Rest  Use humidifier   Delsym as needed for cough  Tea/honey/lozenge for sore throat  Tylenol as needed for fever or pain  Afrin nasal spray for a maximum of three days  Call if symptoms worsen or fail to improve

## 2022-12-02 NOTE — TELEPHONE ENCOUNTER
Based on office note, it seems they had a long discussion about the risks of stopping anti-coagulation. She did not have any recurrent AF on the monitor, but it could recur at any time and put her at risk of thromboembolism/stroke.  If she wants to stop it, then she is fully aware of the risks    REJI Pearce

## 2022-12-02 NOTE — TELEPHONE ENCOUNTER
Spoke to pt and advised her of message below, she v/u and wanted to know if she can stop Xarelto now per last discussion with [facility]. Please see and advise.

## 2022-12-02 NOTE — PROGRESS NOTES
TELEHEALTH EVALUATION -- Audio/Visual (During WKUTV-65 public health emergency)  Acute Office Visit  12/2/2022    SUBJECTIVE:    Patient ID: Jaycob Echavarria is a 58 y.o. female. Chief Complaint   Patient presents with    Cough     Productive x10d    Head Congestion     HPI: The patient presents for an acute visit. Pt reports a productive cough with clear sputum for the last 10 days. Ears feel clogged, fatigue, nasal congestion and sore throat present. ESPINO present. Had chills, but these resolved. Had her COVID-19 vaccines and had flu shot. Has asthma. Denies chills. Denies N/V, diarrhea or abdominal pain. Denies CP or wheezing. Treatment tried and response - symbicort and albuterol  Condition better, worsening, or stable - stable, not improving. Allergies   Allergen Reactions    Latex Rash    Prednisone      Other reaction(s): Per patient-blow up    Statins Nausea Only     Current Outpatient Medications   Medication Sig Dispense Refill    methylPREDNISolone (MEDROL DOSEPACK) 4 MG tablet Take by mouth. 1 kit 0    amoxicillin-clavulanate (AUGMENTIN) 875-125 MG per tablet Take 1 tablet by mouth 2 times daily for 5 days 10 tablet 0    gabapentin (NEURONTIN) 300 MG capsule Take 1 capsule by mouth nightly for 90 days.  Intended supply: 90 days 90 capsule 0    clobetasol (TEMOVATE) 0.05 % cream       triamcinolone (KENALOG) 0.1 % ointment       albuterol sulfate HFA (PROVENTIL;VENTOLIN;PROAIR) 108 (90 Base) MCG/ACT inhaler Inhale 2 puffs into the lungs every 6 hours as needed for Wheezing      colestipol (COLESTID) 1 g tablet TAKE 1 TABLET TWICE A  tablet 1    levothyroxine (SYNTHROID) 112 MCG tablet TAKE 1 TABLET EVERY DAY 90 tablet 1    DULoxetine (CYMBALTA) 60 MG extended release capsule Take 1 capsule by mouth daily 90 capsule 1    montelukast (SINGULAIR) 10 MG tablet TAKE 1 TABLET EVERY NIGHT 90 tablet 1    naltrexone (DEPADE) 50 MG tablet Take 50 mg by mouth daily      buPROPion (WELLBUTRIN SR) 150 MG extended release tablet Take 1 tablet by mouth 2 times daily      cetirizine (ZYRTEC) 10 MG tablet Take 1 tablet by mouth daily 90 tablet 0    acetaminophen (TYLENOL) 650 MG extended release tablet Take 650 mg by mouth every 8 hours as needed for Pain      rivaroxaban (XARELTO) 20 MG TABS tablet Take 1 tablet by mouth Daily with supper (Patient not taking: Reported on 12/2/2022) 90 tablet 3     No current facility-administered medications for this visit. Review of Systems   Constitutional:  Positive for diaphoresis and fatigue. Negative for appetite change, chills and fever. HENT:  Positive for congestion, postnasal drip, rhinorrhea, sinus pressure and sore throat. Negative for drooling, ear discharge, ear pain, hearing loss, sneezing and trouble swallowing. Ears clogged   Eyes:  Negative for pain and visual disturbance. Respiratory:  Positive for cough. Negative for shortness of breath and wheezing. ESPINO   Cardiovascular:  Negative for chest pain and palpitations. Gastrointestinal:  Negative for abdominal pain, constipation, diarrhea, nausea and vomiting. Skin:  Negative for pallor. Neurological:  Negative for dizziness, light-headedness and headaches. OBJECTIVE:  Video Virtual Visit  Patient-Reported Vitals 12/2/2022   Patient-Reported Weight -   Patient-Reported Height -   Patient-Reported Systolic 475   Patient-Reported Diastolic 86   Patient-Reported Pulse 72   Patient-Reported Temperature -   Patient-Reported SpO2 97    ^no access to other VS d/t VV per pt  Physical Exam  Constitutional:       General: She is not in acute distress. Appearance: Normal appearance. She is not ill-appearing. Pulmonary:      Effort: Pulmonary effort is normal. No respiratory distress. Skin:     Coloration: Skin is not jaundiced or pale. Neurological:      Mental Status: She is alert.       Comments: No facial asymmetry noted   Psychiatric:         Mood and Affect: Mood normal.   Due to this being a TeleHealth encounter, evaluation of the following organ systems is limited: Vitals/Constitutional/EENT/Resp/CV/GI//MS/Neuro/Skin/Heme-Lymph-Imm. ASSESSMENT/PLAN:  Marti Saeed was seen today for cough, head congestion and shortness of breath. Diagnoses and all orders for this visit:    Acute cough/Nasal congestion/Sore throat  - Pt has not tested for flu/strep/covid-19. Patient states she is not interested due to length of symptoms  - Reviewed viral vs bacterial infections. - symptoms for 10 days. Not improving. Heading into the weekend. - Patient states she would like to trial steroids at this time. If she worsens or does not improve in a few days, she would like antibiotics  - Reviewed prednisone allergy listed in the patient's chart. Pt states this is not an allergy, but \"it blows me up\" regarding weight. Reviewed side effects vs allergies. Risks vs benefits reviewed. Pt agreeable to steroids.   - Continue inhalers as prescribed. - methylPREDNISolone (MEDROL DOSEPACK) 4 MG tablet; Take by mouth. - patient education handout provided and reviewed with the pt.  -     amoxicillin-clavulanate (AUGMENTIN) 875-125 MG per tablet; Take 1 tablet by mouth 2 times daily for 5 days - patient education handout provided and reviewed with the pt. - Symptomatic management reviewed. - Pt will call if symptoms worsen or fail to improve  - Red flag warning signs reviewed with the pt and she will go to the ER if these occur. Return in about 2 weeks (around 12/16/2022) for AWV, or sooner if needed. Anamaria Lund, was evaluated through a synchronous (real-time) audio-video encounter. The patient (or guardian if applicable) is aware that this is a billable service, which includes applicable co-pays. This Virtual Visit was conducted with patient's (and/or legal guardian's) consent.  The visit was conducted pursuant to the emergency declaration under the [facility] [address] waiver authority and the sms[employer]P and [employer] General Act. Patient identification was verified, and a caregiver was present when appropriate. The patient was located in a state where the provider was licensed to provide care. Consent:  He and/or health care decision maker is aware that that he may receive a bill for this virtual service, depending on his insurance coverage, and has provided verbal consent to proceed: Yes    Patient identification was verified at the start of the visit: Yes    Total time spent on this encounter: Not billed by time    Services were provided through a video synchronous discussion virtually to substitute for in-person clinic visit. Patient and provider were located at their individual homes. All questions answered. Pt states no further questions or concerns at this time.    Electronically signed by: JOSE Salazar CNP 12/02/22

## 2022-12-13 DIAGNOSIS — M79.7 FIBROMYALGIA: ICD-10-CM

## 2022-12-13 NOTE — TELEPHONE ENCOUNTER
----- Message from Hortencia Wray sent at 12/13/2022 11:13 AM EST -----  Subject: Refill Request    QUESTIONS  Name of Medication? DULoxetine (CYMBALTA) 60 MG extended release capsule  Patient-reported dosage and instructions? Once daily  How many days do you have left? 0  Preferred [facility]acy? [facility] phone number (if available)? [phone]  Additional Information for Provider? Pharmacy has been trying to get a   refill and no reply from office. Pt is out of medication. If she needs to   stop taking it please call pt to advise.  ---------------------------------------------------------------------------  --------------  CALL BACK INFO  What is the best way for the office to contact you? OK to leave message on   voicemail  Preferred Call Back Phone Number? [phone]  ---------------------------------------------------------------------------  --------------  SCRIPT ANSWERS  Relationship to Patient?  Self

## 2022-12-16 RX ORDER — DULOXETIN HYDROCHLORIDE 60 MG/1
60 CAPSULE, DELAYED RELEASE ORAL DAILY
Qty: 30 CAPSULE | Refills: 0 | Status: SHIPPED | OUTPATIENT
Start: 2022-12-16

## 2022-12-30 ENCOUNTER — OFFICE VISIT (OUTPATIENT)
Dept: INTERNAL MEDICINE CLINIC | Age: 62
End: 2022-12-30

## 2022-12-30 VITALS
DIASTOLIC BLOOD PRESSURE: 70 MMHG | HEIGHT: 64 IN | BODY MASS INDEX: 41.86 KG/M2 | HEART RATE: 70 BPM | WEIGHT: 245.2 LBS | SYSTOLIC BLOOD PRESSURE: 124 MMHG | OXYGEN SATURATION: 98 %

## 2022-12-30 DIAGNOSIS — R73.03 PREDIABETES: ICD-10-CM

## 2022-12-30 DIAGNOSIS — J45.909 UNCOMPLICATED ASTHMA, UNSPECIFIED ASTHMA SEVERITY, UNSPECIFIED WHETHER PERSISTENT: ICD-10-CM

## 2022-12-30 DIAGNOSIS — I48.0 PAF (PAROXYSMAL ATRIAL FIBRILLATION) (HCC): ICD-10-CM

## 2022-12-30 DIAGNOSIS — H61.22 IMPACTED CERUMEN OF LEFT EAR: ICD-10-CM

## 2022-12-30 DIAGNOSIS — Z00.00 MEDICARE ANNUAL WELLNESS VISIT, SUBSEQUENT: Primary | ICD-10-CM

## 2022-12-30 DIAGNOSIS — I10 PRIMARY HYPERTENSION: ICD-10-CM

## 2022-12-30 DIAGNOSIS — E78.49 OTHER HYPERLIPIDEMIA: ICD-10-CM

## 2022-12-30 DIAGNOSIS — Z63.79 STRESSFUL LIFE EVENT AFFECTING FAMILY: ICD-10-CM

## 2022-12-30 DIAGNOSIS — E03.8 OTHER SPECIFIED HYPOTHYROIDISM: ICD-10-CM

## 2022-12-30 DIAGNOSIS — M79.7 FIBROMYALGIA: ICD-10-CM

## 2022-12-30 DIAGNOSIS — J30.89 SEASONAL ALLERGIC RHINITIS DUE TO OTHER ALLERGIC TRIGGER: ICD-10-CM

## 2022-12-30 DIAGNOSIS — H91.93 DECREASED HEARING OF BOTH EARS: ICD-10-CM

## 2022-12-30 DIAGNOSIS — G47.39 OTHER SLEEP APNEA: ICD-10-CM

## 2022-12-30 RX ORDER — LEVOTHYROXINE SODIUM 112 UG/1
TABLET ORAL
Qty: 90 TABLET | Refills: 1 | Status: SHIPPED | OUTPATIENT
Start: 2022-12-30

## 2022-12-30 RX ORDER — MONTELUKAST SODIUM 10 MG/1
TABLET ORAL
Qty: 90 TABLET | Refills: 1 | Status: SHIPPED | OUTPATIENT
Start: 2022-12-30

## 2022-12-30 ASSESSMENT — PATIENT HEALTH QUESTIONNAIRE - PHQ9
SUM OF ALL RESPONSES TO PHQ QUESTIONS 1-9: 4
10. IF YOU CHECKED OFF ANY PROBLEMS, HOW DIFFICULT HAVE THESE PROBLEMS MADE IT FOR YOU TO DO YOUR WORK, TAKE CARE OF THINGS AT HOME, OR GET ALONG WITH OTHER PEOPLE: 0
7. TROUBLE CONCENTRATING ON THINGS, SUCH AS READING THE NEWSPAPER OR WATCHING TELEVISION: 1
1. LITTLE INTEREST OR PLEASURE IN DOING THINGS: 0
5. POOR APPETITE OR OVEREATING: 0
6. FEELING BAD ABOUT YOURSELF - OR THAT YOU ARE A FAILURE OR HAVE LET YOURSELF OR YOUR FAMILY DOWN: 0
SUM OF ALL RESPONSES TO PHQ9 QUESTIONS 1 & 2: 0
4. FEELING TIRED OR HAVING LITTLE ENERGY: 0
2. FEELING DOWN, DEPRESSED OR HOPELESS: 0
3. TROUBLE FALLING OR STAYING ASLEEP: 3
SUM OF ALL RESPONSES TO PHQ QUESTIONS 1-9: 4
8. MOVING OR SPEAKING SO SLOWLY THAT OTHER PEOPLE COULD HAVE NOTICED. OR THE OPPOSITE, BEING SO FIGETY OR RESTLESS THAT YOU HAVE BEEN MOVING AROUND A LOT MORE THAN USUAL: 0
9. THOUGHTS THAT YOU WOULD BE BETTER OFF DEAD, OR OF HURTING YOURSELF: 0
SUM OF ALL RESPONSES TO PHQ QUESTIONS 1-9: 4
SUM OF ALL RESPONSES TO PHQ QUESTIONS 1-9: 4

## 2022-12-30 ASSESSMENT — ENCOUNTER SYMPTOMS
CONSTIPATION: 0
ABDOMINAL PAIN: 0
SHORTNESS OF BREATH: 0
VOMITING: 0
COUGH: 0
NAUSEA: 0
DIARRHEA: 0
WHEEZING: 0

## 2022-12-30 ASSESSMENT — LIFESTYLE VARIABLES
HOW OFTEN DO YOU HAVE A DRINK CONTAINING ALCOHOL: MONTHLY OR LESS
HOW MANY STANDARD DRINKS CONTAINING ALCOHOL DO YOU HAVE ON A TYPICAL DAY: 1 OR 2

## 2022-12-30 NOTE — PROGRESS NOTES
Medicare Annual Wellness Visit    Jason Momin is here for Medicare AWV    Assessment & Plan   Medicare annual wellness visit, subsequent  Primary hypertension  -     Comprehensive Metabolic Panel; Future  PAF (paroxysmal atrial fibrillation) (HCC)  Other hyperlipidemia  -     Lipid, Fasting; Future  Stressful life event affecting family  Prediabetes  -     Hemoglobin A1C; Future  Other sleep apnea  Seasonal allergic rhinitis due to other allergic trigger  -     montelukast (SINGULAIR) 10 MG tablet; TAKE 1 TABLET EVERY NIGHT, Disp-90 tablet, [id number]  Uncomplicated asthma, unspecified asthma severity, unspecified whether persistent  Fibromyalgia  Other specified hypothyroidism  -     levothyroxine (SYNTHROID) 112 MCG tablet; TAKE 1 TABLET EVERY DAY, Disp-90 tablet, R-1Normal  -     TSH; Future  Decreased hearing of both ears  -     72479 - [place] REMOVE IMPACTED EAR WAX  Impacted cerumen of left ear  -     71049 - [place] REMOVE IMPACTED EAR WAX    Recommendations for Preventive Services Due: see orders and patient instructions/AVS.  Recommended screening schedule for the next 5-10 years is provided to the patient in written form: see Patient Instructions/AVS.     Return in 6 months (on 6/30/2023) for HTN/TSH f/u, or sooner if needed. Subjective   See other OV note dated 12/30/22    Patient's complete Health Risk Assessment and screening values have been reviewed and are found in Flowsheets. The following problems were reviewed today and where indicated follow up appointments were made and/or referrals ordered.     Positive Risk Factor Screenings with Interventions:                 Weight and Activity:  Physical Activity: Sufficiently Active    Days of Exercise per Week: 4 days    Minutes of Exercise per Session: 50 min     On average, how many days per week do you engage in moderate to strenuous exercise (like a brisk walk)?: 4 days  Have you lost any weight without trying in the past 3 months?: No  Body mass index: (!) 42.08  Obesity Interventions:  exercise for at least 150 minutes/week  Continue with weight management  Lifestyle modifications such as exercise, weight loss and healthy diet encouraged and reviewed with the pt. Hearing Screen:  Do you or your family notice any trouble with your hearing that hasn't been managed with hearing aids?: (!) Yes    Interventions:  Cerumen impaction noted on the left. A curette was utilized to remove wax. TM normal bilaterally. Patient reports that she is hearing better after cerumen removal.     Safety:  Do you have any tripping hazards - loose or unsecured carpets or rugs?: (!) Yes  Interventions:  Safety reviewed               Objective   Vitals:    12/30/22 1118   BP: 124/70   Pulse: 70   SpO2: 98%   Weight: 245 lb 3.2 oz (111.2 kg)   Height: 5' 4\" (1.626 m)      Body mass index is 42.09 kg/m². Allergies   Allergen Reactions    Latex Rash    Prednisone      Other reaction(s): Per patient-blow up    Statins Nausea Only     Prior to Visit Medications    Medication Sig Taking? Authorizing Provider   levothyroxine (SYNTHROID) 112 MCG tablet TAKE 1 TABLET EVERY DAY Yes JOSE Mccray CNP   montelukast (SINGULAIR) 10 MG tablet TAKE 1 TABLET EVERY NIGHT Yes JOSE Mccray CNP   gabapentin (NEURONTIN) 300 MG capsule Take 1 capsule by mouth nightly for 90 days.  Intended supply: 90 days Yes Nery Guerrier MD   triamcinolone (KENALOG) 0.1 % ointment  Yes Historical Provider, MD   albuterol sulfate HFA (PROVENTIL;VENTOLIN;PROAIR) 108 (90 Base) MCG/ACT inhaler Inhale 2 puffs into the lungs every 6 hours as needed for Wheezing Yes Historical Provider, MD   colestipol (COLESTID) 1 g tablet TAKE 1 TABLET TWICE A DAY Yes JOSE Mccray CNP   naltrexone (DEPADE) 50 MG tablet Take 50 mg by mouth daily Yes Historical Provider, MD   cetirizine (ZYRTEC) 10 MG tablet Take 1 tablet by mouth daily Yes JOSE Mccray CNP acetaminophen (TYLENOL) 650 MG extended release tablet Take 650 mg by mouth every 8 hours as needed for Pain Yes Historical Provider, MD   rivaroxaban (XARELTO) 20 MG TABS tablet Take 1 tablet by mouth Daily with supper  Patient not taking: Reported on 12/30/2022  [facility] (Including outside providers/suppliers regularly involved in providing care):   Patient Care Team:  Karissa Ward, [address] as PCP - General (Nurse Practitioner)  JOSE Mccray CNP as PCP - [address] [place] [address] Dr Webb Provider     Reviewed and updated this visit:  Tobacco  Allergies  Meds  Med Hx  Surg Hx  Soc Hx  Fam Hx      All questions answered. Patient states no further questions or concerns at this time.   Electronically signed by: JOSE Mccray CNP 12/30/22

## 2022-12-30 NOTE — PATIENT INSTRUCTIONS
Please get your fasting lab work (no food or drink for 10-12 hours prior besides water) completed M-F [id number] at our office. [facility] lab has walk-in hours available as well - no appointment is needed. We will call or mychart message you with your results. Advance Directives: Care Instructions  Overview  An advance directive is a legal way to state your wishes at the end of your life. It tells your family and your doctor what to do if you can't say what you want. There are two main types of advance directives. You can change them any time your wishes change. Living will. This form tells your family and your doctor your wishes about life support and other treatment. The form is also called a declaration. Medical power of . This form lets you name a person to make treatment decisions for you when you can't speak for yourself. This person is called a health care agent (health care proxy, health care surrogate). The form is also called a durable power of  for health care. If you do not have an advance directive, decisions about your medical care may be made by a family member, or by a doctor or a  who doesn't know you. It may help to think of an advance directive as a gift to the people who care for you. If you have one, they won't have to make tough decisions by themselves. For more information, including forms for your state, see the [address] website (www.caringinfo.org/planning/advance-directives/). Follow-up care is a key part of your treatment and safety. Be sure to make and go to all appointments, and call your doctor if you are having problems. It's also a good idea to know your test results and keep a list of the medicines you take. What should you include in an advance directive? Many states have a unique advance directive form. (It may ask you to address specific issues.) Or you might use a universal form that's approved by many states.   If your form doesn't tell you what to address, it may be hard to know what to include in your advance directive. Use the questions below to help you get started. Who do you want to make decisions about your medical care if you are not able to? What life-support measures do you want if you have a serious illness that gets worse over time or can't be cured? What are you most afraid of that might happen? (Maybe you're afraid of having pain, losing your independence, or being kept alive by machines.)  Where would you prefer to die? (Your home? A hospital? A nursing home?)  Do you want to donate your organs when you die? Do you want certain Taoist practices performed before you die? When should you call for help? Be sure to contact your doctor if you have any questions. Where can you learn more? Go to http://Snapjoy.almonte.com/ and enter R264 to learn more about \"Advance Directives: Care Instructions. \"  Current as of: June 16, 2022               Content Version: 13.5  © 9029-4254 Healthwise, Incorporated. Care instructions adapted under license by [facility] ([facility]). If you have questions about a medical condition or this instruction, always ask your healthcare professional. [place] [address] any warranty or liability for your use of this information. Personalized Preventive Plan for Jason Momin - 12/30/2022  Medicare offers a range of preventive health benefits. Some of the tests and screenings are paid in full while other may be subject to a deductible, co-insurance, and/or copay. Some of these benefits include a comprehensive review of your medical history including lifestyle, illnesses that may run in your family, and various assessments and screenings as appropriate. After reviewing your medical record and screening and assessments performed today your provider may have ordered immunizations, labs, imaging, and/or referrals for you.   A list of these orders (if applicable) as well as your Preventive Care list are included within your After Visit Summary for your review. Other Preventive Recommendations:    A preventive eye exam performed by an eye specialist is recommended every 1-2 years to screen for glaucoma; cataracts, macular degeneration, and other eye disorders. A preventive dental visit is recommended every 6 months. Try to get at least 150 minutes of exercise per week or 10,000 steps per day on a pedometer . Order or download the FREE \"Exercise & Physical Activity: Your Everyday Guide\" from The [employer]. Call 4-840.880.5341 or search The [facility] [employer] on Aging online. You need 7869-9501 mg of calcium and 1549-6562 IU of vitamin D per day. It is possible to meet your calcium requirement with diet alone, but a vitamin D supplement is usually necessary to meet this goal.  When exposed to the sun, use a sunscreen that protects against both UVA and UVB radiation with an SPF of 30 or greater. Reapply every 2 to 3 hours or after sweating, drying off with a towel, or swimming. Always wear a seat belt when traveling in a car. Always wear a helmet when riding a bicycle or motorcycle.

## 2022-12-30 NOTE — PROGRESS NOTES
Office Visit   12/30/2022    Subjective:  Chief Complaint   Patient presents with    Medicare AWV     HPI:   Fara Estrada is a 58 y.o. female who presents to the clinic today for follow up. HTN/afib- seeing cardiology. Had an ablation. BP at home has been running 120/80. States she is debating with cardiology on whether to take a blood thinner. Asymptomatic. Denies chest pain, palpitations, shortness of breath, trouble breathing, lightheadedness, dizziness or blurred vision. Hyperlipidemia- Takes colestipol 1 gram BID. States she tried 3-4 statins and \"they made me crazy. \" Not interested in stains. Mood- Pt prescribed cymbalta 60 mg daily- states she stopped taking this two weeks ago and she is not interested in restarting this medication. She states her mood is doing well. Denies SI/HI. Not seeing a psychologist.      Prediabetes-  Exercising 4 days a week by walking. Diet is reported as \"pretty healthy. \" Seeing weight management. Sleep apnea- using CPAP. Allergic rhinitis- Taking singulair and zyrtec with relief. Denies side effects. Asthma- uses symbicort daily (in spring and fall) and albuterol PRN. well controlled per pt. Rare albuterol use. Denies trouble breathing/SOB/wheezing. Fibromyalgia-  Chronic joint pains. Follows with rheumatology. Seeing [facility] for back pains- seeing yearly. Hypothyroidism- Taking synthroid 112 mcg for years. Asymptomatic. Denies side effects. Review of Systems   Constitutional:  Negative for chills, fatigue, fever and unexpected weight change. HENT:  Positive for hearing loss. Eyes:  Negative for visual disturbance. Respiratory:  Negative for cough, shortness of breath and wheezing. Cardiovascular:  Negative for chest pain, palpitations and leg swelling. Gastrointestinal:  Negative for abdominal pain, constipation, diarrhea, nausea and vomiting. Skin:  Negative for pallor and rash.    Neurological:  Negative for dizziness, fibrillation) ([facility] [address]    Other specified hypothyroidism        Wt Readings from Last 3 Encounters:   12/30/22 245 lb 3.2 oz (111.2 kg)   10/05/22 240 lb (108.9 kg)   08/17/22 247 lb (112 kg)     BP Readings from Last 3 Encounters:   12/30/22 124/70   10/05/22 138/80   08/17/22 130/76     The 10-year ASCVD risk score (Sandy YANG, et al., 2019) is: 4.3%    Values used to calculate the score:      Age: 58 years      Sex: Female      Is Non- : No      Diabetic: No      Tobacco smoker: No      Systolic Blood Pressure: 315 mmHg      Is BP treated: No      HDL Cholesterol: 48 mg/dL      Total Cholesterol: 210 mg/dL    PHQ-9 Total Score: 4 (12/30/2022 11:30 AM)  Thoughts that you would be better off dead, or of hurting yourself in some way: 0 (12/30/2022 11:30 AM)    Objective/Physical Exam:  /70   Pulse 70   Ht 5' 4\" (1.626 m)   Wt 245 lb 3.2 oz (111.2 kg)   SpO2 98%   BMI 42.09 kg/m²   Body mass index is 42.09 kg/m². Physical Exam  Vitals reviewed. Constitutional:       General: She is not in acute distress. Appearance: She is well-developed. She is not diaphoretic. HENT:      Head: Normocephalic and atraumatic. Right Ear: Tympanic membrane normal. There is no impacted cerumen. Left Ear: Tympanic membrane normal. There is impacted cerumen. Eyes:      Pupils: Pupils are equal, round, and reactive to light. Cardiovascular:      Rate and Rhythm: Normal rate and regular rhythm. Pulmonary:      Effort: Pulmonary effort is normal. No respiratory distress. Breath sounds: Normal breath sounds. No wheezing or rales. Chest:      Chest wall: No tenderness. Abdominal:      General: Bowel sounds are normal.      Palpations: Abdomen is soft. Skin:     General: Skin is warm and dry. Coloration: Skin is not pale. Findings: No erythema or rash. Neurological:      Mental Status: She is alert and oriented to person, place, and time.       Coordination: Coordination normal.   Psychiatric:         Mood and Affect: Mood normal.     Assessment and Plan:  Olean Severin was seen today for medicare awv. Diagnoses and all orders for this visit:    Medicare annual wellness visit, subsequent   - See other office visit note dated 12/30/2022    Primary hypertension/PAF (paroxysmal atrial fibrillation) (HCC)/Other hyperlipidemia  -     Blood pressure stable today. - Continue with cardiology  - Comprehensive Metabolic Panel; Future  -     Lipid, Fasting; Future    Stressful life event affecting family   - States that she stopped Cymbalta on her own 2 weeks ago. She states that her mood is doing well without medications and she would like to remain off of medications. PHQ-9 is 4. Denies suicidal or homicidal ideation.   - Patient will call if symptoms worsen or fail to improve    Prediabetes  -     will reevaluate  - Lifestyle modifications such as exercise, weight loss and healthy diet encouraged and reviewed with the pt. - Hemoglobin A1C; Future    Other sleep apnea   - Continue with sleep medicine    Seasonal allergic rhinitis due to other allergic trigger  -     Well-controlled the current regimen without side effects  - Continue current regimen  - montelukast (SINGULAIR) 10 MG tablet; TAKE 1 TABLET EVERY NIGHT    Uncomplicated asthma, unspecified asthma severity, unspecified whether persistent   - Not using her daily inhaler and not interested in doing so. - Rare albuterol use per patient. Fibromyalgia   - Continue with rheumatology    Other specified hypothyroidism  -     Asymptomatic. No side effects.  - Will reevaluate  - levothyroxine (SYNTHROID) 112 MCG tablet; TAKE 1 TABLET EVERY DAY  -     TSH; Future    Decreased hearing of both ears/Impacted cerumen of left ear  -     Noted on physical exam.  - 93968 - [place] REMOVE IMPACTED EAR WAX  - After using a curette to remove the wax, the patient reports that she can hear better and she does not have pain.   - Lifestyle modifications to prevent cerumen impaction reviewed with the patient. All questions were answered    Return in 6 months (on 6/30/2023) for HTN/TSH f/u, or sooner if needed. Pt will call if symptoms worsen or fail to improve. All questions answered. Pt states no further questions or concerns at this time.    Electronically signed by: JOSE Alvarez CNP 12/30/22

## 2023-01-10 DIAGNOSIS — M79.7 FIBROMYALGIA: ICD-10-CM

## 2023-01-10 RX ORDER — GABAPENTIN 300 MG/1
300 CAPSULE ORAL NIGHTLY
Qty: 90 CAPSULE | Refills: 1 | Status: SHIPPED | OUTPATIENT
Start: 2023-01-10 | End: 2023-04-10

## 2023-01-31 ENCOUNTER — TELEPHONE (OUTPATIENT)
Dept: INTERNAL MEDICINE CLINIC | Age: 63
End: 2023-01-31

## 2023-01-31 NOTE — TELEPHONE ENCOUNTER
Pt is calling in from nurse triage, nurse triage stated she was having difficulty breathing due to her medications. However that wasn't why she called in, she called in because she feels like she is taking too many medications and would like that to be discussed with pcp, also she was taken off of duloxetine and has had some negative effects/emotions, just feeling incredibly emotional but not suicidal or entirely depressed, says it's not nearly as bad as when before she was on duloxetine. She is also feeling like she is getting shortness of breath due to her mixture of medications and that eliminating some of them might be beneficial for her as well. Needs to know if a further evaluation is needed or what pcp would like to do. Please advise further [phone]. Says she is going to work at 6, but will be of tomorrow if pcp needs to speak with pt.

## 2023-02-22 ENCOUNTER — TELEPHONE (OUTPATIENT)
Dept: INTERNAL MEDICINE CLINIC | Age: 63
End: 2023-02-22

## 2023-02-22 ENCOUNTER — OFFICE VISIT (OUTPATIENT)
Dept: INTERNAL MEDICINE CLINIC | Age: 63
End: 2023-02-22

## 2023-02-22 VITALS
BODY MASS INDEX: 43.54 KG/M2 | SYSTOLIC BLOOD PRESSURE: 124 MMHG | DIASTOLIC BLOOD PRESSURE: 82 MMHG | HEIGHT: 64 IN | WEIGHT: 255 LBS | OXYGEN SATURATION: 99 % | HEART RATE: 65 BPM

## 2023-02-22 DIAGNOSIS — J30.89 SEASONAL ALLERGIC RHINITIS DUE TO OTHER ALLERGIC TRIGGER: ICD-10-CM

## 2023-02-22 DIAGNOSIS — I10 PRIMARY HYPERTENSION: Primary | ICD-10-CM

## 2023-02-22 DIAGNOSIS — Z63.79 STRESSFUL LIFE EVENT AFFECTING FAMILY: ICD-10-CM

## 2023-02-22 DIAGNOSIS — E03.8 OTHER SPECIFIED HYPOTHYROIDISM: ICD-10-CM

## 2023-02-22 DIAGNOSIS — J45.909 UNCOMPLICATED ASTHMA, UNSPECIFIED ASTHMA SEVERITY, UNSPECIFIED WHETHER PERSISTENT: ICD-10-CM

## 2023-02-22 DIAGNOSIS — G47.39 OTHER SLEEP APNEA: ICD-10-CM

## 2023-02-22 DIAGNOSIS — M79.7 FIBROMYALGIA: ICD-10-CM

## 2023-02-22 DIAGNOSIS — R73.03 PREDIABETES: ICD-10-CM

## 2023-02-22 DIAGNOSIS — E78.49 OTHER HYPERLIPIDEMIA: ICD-10-CM

## 2023-02-22 DIAGNOSIS — I48.0 PAF (PAROXYSMAL ATRIAL FIBRILLATION) (HCC): ICD-10-CM

## 2023-02-22 DIAGNOSIS — E66.01 OBESITY, CLASS III, BMI 40-49.9 (MORBID OBESITY) (HCC): ICD-10-CM

## 2023-02-22 RX ORDER — MULTIVIT-MIN/IRON/FOLIC ACID/K 18-600-40
CAPSULE ORAL
COMMUNITY

## 2023-02-22 RX ORDER — ASCORBIC ACID 500 MG
500 TABLET ORAL DAILY
COMMUNITY

## 2023-02-22 RX ORDER — OXYCODONE HYDROCHLORIDE 5 MG/1
5 CAPSULE ORAL EVERY 4 HOURS PRN
COMMUNITY

## 2023-02-22 RX ORDER — M-VIT,TX,IRON,MINS/CALC/FOLIC 27MG-0.4MG
1 TABLET ORAL DAILY
COMMUNITY

## 2023-02-22 RX ORDER — BUPROPION HYDROCHLORIDE 150 MG/1
150 TABLET ORAL 2 TIMES DAILY
COMMUNITY

## 2023-02-22 RX ORDER — MAGNESIUM OXIDE 400 MG/1
400 TABLET ORAL DAILY
COMMUNITY

## 2023-02-22 SDOH — ECONOMIC STABILITY: HOUSING INSECURITY
IN THE LAST 12 MONTHS, WAS THERE A TIME WHEN YOU DID NOT HAVE A STEADY PLACE TO SLEEP OR SLEPT IN A SHELTER (INCLUDING NOW)?: NO

## 2023-02-22 SDOH — ECONOMIC STABILITY: FOOD INSECURITY: WITHIN THE PAST 12 MONTHS, YOU WORRIED THAT YOUR FOOD WOULD RUN OUT BEFORE YOU GOT MONEY TO BUY MORE.: NEVER TRUE

## 2023-02-22 SDOH — ECONOMIC STABILITY: FOOD INSECURITY: WITHIN THE PAST 12 MONTHS, THE FOOD YOU BOUGHT JUST DIDN'T LAST AND YOU DIDN'T HAVE MONEY TO GET MORE.: NEVER TRUE

## 2023-02-22 SDOH — ECONOMIC STABILITY: INCOME INSECURITY: HOW HARD IS IT FOR YOU TO PAY FOR THE VERY BASICS LIKE FOOD, HOUSING, MEDICAL CARE, AND HEATING?: NOT HARD AT ALL

## 2023-02-22 ASSESSMENT — PATIENT HEALTH QUESTIONNAIRE - PHQ9
9. THOUGHTS THAT YOU WOULD BE BETTER OFF DEAD, OR OF HURTING YOURSELF: 0
SUM OF ALL RESPONSES TO PHQ9 QUESTIONS 1 & 2: 1
1. LITTLE INTEREST OR PLEASURE IN DOING THINGS: 0
8. MOVING OR SPEAKING SO SLOWLY THAT OTHER PEOPLE COULD HAVE NOTICED. OR THE OPPOSITE, BEING SO FIGETY OR RESTLESS THAT YOU HAVE BEEN MOVING AROUND A LOT MORE THAN USUAL: 0
SUM OF ALL RESPONSES TO PHQ QUESTIONS 1-9: 7
2. FEELING DOWN, DEPRESSED OR HOPELESS: 1
10. IF YOU CHECKED OFF ANY PROBLEMS, HOW DIFFICULT HAVE THESE PROBLEMS MADE IT FOR YOU TO DO YOUR WORK, TAKE CARE OF THINGS AT HOME, OR GET ALONG WITH OTHER PEOPLE: 0
SUM OF ALL RESPONSES TO PHQ QUESTIONS 1-9: 7
7. TROUBLE CONCENTRATING ON THINGS, SUCH AS READING THE NEWSPAPER OR WATCHING TELEVISION: 0
SUM OF ALL RESPONSES TO PHQ QUESTIONS 1-9: 7
6. FEELING BAD ABOUT YOURSELF - OR THAT YOU ARE A FAILURE OR HAVE LET YOURSELF OR YOUR FAMILY DOWN: 0
SUM OF ALL RESPONSES TO PHQ QUESTIONS 1-9: 7
5. POOR APPETITE OR OVEREATING: 0
3. TROUBLE FALLING OR STAYING ASLEEP: 3
4. FEELING TIRED OR HAVING LITTLE ENERGY: 3

## 2023-02-22 ASSESSMENT — ENCOUNTER SYMPTOMS
ABDOMINAL PAIN: 0
WHEEZING: 0
NAUSEA: 0
VOMITING: 0
CONSTIPATION: 0
SHORTNESS OF BREATH: 0
COUGH: 0
DIARRHEA: 0

## 2023-02-22 NOTE — PROGRESS NOTES
Office Visit   2/22/2023    Subjective:  Chief Complaint   Patient presents with    Follow-up    Diabetes    Other     Med review     HPI:   Skyla Turner is a 58 y.o. female who presents to the clinic today for follow up. HTN/afib- seeing cardiology. Had an ablation. BP at home has been running 120/80. Not interested in taking a blood thinner- states she reviewed this with cardiology. Asymptomatic. Denies chest pain, palpitations, shortness of breath, trouble breathing, lightheadedness, dizziness or blurred vision. Hyperlipidemia- prescribed colestipol 1 gram BID. States she tried 3-4 statins and \"they made me crazy. \" Not interested in stains. Mood-  Taking wellbutrin as well through a weight  at [facility]. She states her mood is doing well. Denies SI/HI. Not seeing a psychologist.     Prediabetes-  Seeing weight management. not exercising. Walking at work more. Diet is reported as \"fair. \"     Sleep apnea- has CPAP-states it is broken right now and they are working to fix it. More tired since this broke. Sees sleep medicine. Allergic rhinitis- Taking singulair and zyrtec with relief. Denies side effects. Asthma- uses symbicort daily (in spring and fall) and albuterol PRN. Well controlled per pt. Rare albuterol use- not since before our last visit. Denies trouble breathing/SOB/wheezing. Fibromyalgia-  Chronic joint pains. Follows with rheumatology. Seeing [facility] for back pains- seeing yearly. Hypothyroidism- Taking synthroid 112 mcg for years. Asymptomatic. Denies side effects. Review of Systems   Constitutional:  Negative for chills, fatigue, fever and unexpected weight change. Eyes:  Negative for visual disturbance. Respiratory:  Negative for cough, shortness of breath and wheezing. Cardiovascular:  Negative for chest pain, palpitations and leg swelling. Gastrointestinal:  Negative for abdominal pain, constipation, diarrhea, nausea and vomiting. Skin:  Negative for pallor and rash. Neurological:  Negative for dizziness, weakness, light-headedness, numbness and headaches. Psychiatric/Behavioral:  Negative for dysphoric mood, self-injury, sleep disturbance and suicidal ideas. The patient is not nervous/anxious. Allergies   Allergen Reactions    Latex Rash    Prednisone      Other reaction(s): Per patient-blow up    Statins Nausea Only       Current Outpatient Rx   Medication Sig Dispense Refill    Multiple Vitamins-Minerals (THERAPEUTIC MULTIVITAMIN-MINERALS) tablet Take 1 tablet by mouth daily      PREBIOTIC PRODUCT PO Take by mouth      Lactobacillus (PROBIOTIC ACIDOPHILUS PO) Take by mouth      LECITHIN PO Take by mouth      ZINC PO Take by mouth      buPROPion (WELLBUTRIN XL) 150 MG extended release tablet Take 150 mg by mouth in the morning and at bedtime      Hypromellose (ALZAIR ALLERGY NASAL SPRAY NA) by Nasal route      Polyvinyl Alcohol-Povidone (CLEAR EYES NATURAL TEARS OP) Apply to eye      Melatonin 2.5 MG CHEW Take by mouth      vitamin C (ASCORBIC ACID) 500 MG tablet Take 500 mg by mouth daily      Naproxen Sod-diphenhydrAMINE (ALEVE PM PO) Take by mouth      Cholecalciferol (VITAMIN D) 50 MCG (2000 UT) CAPS capsule Take by mouth      magnesium oxide (MAG-OX) 400 MG tablet Take 400 mg by mouth daily      oxyCODONE 5 MG capsule Take 5 mg by mouth every 4 hours as needed for Pain.      gabapentin (NEURONTIN) 300 MG capsule Take 1 capsule by mouth nightly for 90 days.  Intended supply: 90 days 90 capsule 1    levothyroxine (SYNTHROID) 112 MCG tablet TAKE 1 TABLET EVERY DAY 90 tablet 1    montelukast (SINGULAIR) 10 MG tablet TAKE 1 TABLET EVERY NIGHT 90 tablet 1    triamcinolone (KENALOG) 0.1 % ointment       albuterol sulfate HFA (PROVENTIL;VENTOLIN;PROAIR) 108 (90 Base) MCG/ACT inhaler Inhale 2 puffs into the lungs every 6 hours as needed for Wheezing      colestipol (COLESTID) 1 g tablet TAKE 1 TABLET TWICE A  tablet 1 naltrexone (DEPADE) 50 MG tablet Take 50 mg by mouth daily      cetirizine (ZYRTEC) 10 MG tablet Take 1 tablet by mouth daily 90 tablet 0    acetaminophen (TYLENOL) 650 MG extended release tablet Take 650 mg by mouth every 8 hours as needed for Pain         Patient Active Problem List   Diagnosis    Hypertension    Hyperlipidemia    Back pain    Sleep apnea    Urinary incontinence    Class 3 severe obesity due to excess calories without serious comorbidity with body mass index (BMI) of 45.0 to 49.9 in adult [facility])    Peripheral polyneuropathy    Neurogenic claudication due to lumbar spinal stenosis    Osteoarthritis of spine with radiculopathy, lumbar region    Balance problem    Skin lesion of left leg    Intractable chronic migraine without aura and with status migrainosus    Prediabetes    Depression    Asthma    Fibromyalgia    PAF (paroxysmal atrial fibrillation) ([facility] [address]    Other specified hypothyroidism        Wt Readings from Last 3 Encounters:   02/22/23 255 lb (115.7 kg)   12/30/22 245 lb 3.2 oz (111.2 kg)   10/05/22 240 lb (108.9 kg)     BP Readings from Last 3 Encounters:   02/22/23 124/82   12/30/22 124/70   10/05/22 138/80     The 10-year ASCVD risk score (Sandy YANG, et al., 2019) is: 4.3%    Values used to calculate the score:      Age: 58 years      Sex: Female      Is Non- : No      Diabetic: No      Tobacco smoker: No      Systolic Blood Pressure: 285 mmHg      Is BP treated: No      HDL Cholesterol: 48 mg/dL      Total Cholesterol: 210 mg/dL    PHQ-9 Total Score: 7 (2/22/2023  8:13 AM)  Thoughts that you would be better off dead, or of hurting yourself in some way: 0 (2/22/2023  8:13 AM)    Objective/Physical Exam:  /82   Pulse 65   Ht 5' 4\" (1.626 m)   Wt 255 lb (115.7 kg)   SpO2 99%   BMI 43.77 kg/m²   Body mass index is 43.77 kg/m². Physical Exam  Vitals reviewed. Constitutional:       General: She is not in acute distress.      Appearance: She is well-developed. She is not diaphoretic. HENT:      Head: Normocephalic and atraumatic. Eyes:      Pupils: Pupils are equal, round, and reactive to light. Cardiovascular:      Rate and Rhythm: Normal rate and regular rhythm. Pulmonary:      Effort: Pulmonary effort is normal. No respiratory distress. Breath sounds: Normal breath sounds. No wheezing or rales. Chest:      Chest wall: No tenderness. Abdominal:      General: Bowel sounds are normal.      Palpations: Abdomen is soft. Skin:     General: Skin is warm and dry. Neurological:      Mental Status: She is alert and oriented to person, place, and time. Coordination: Coordination normal.   Psychiatric:         Mood and Affect: Mood normal.     Assessment and Plan:  Mary Church was seen today for follow-up, diabetes and other. Diagnoses and all orders for this visit:    Pt brought all of her medications and extensively reviewed. Med list updated. Labs ordered last visit not completed as recommended. Compliance encouraged    Primary hypertension/PAF (paroxysmal atrial fibrillation) (HCC)/Other hyperlipidemia   - Blood pressure elevated on first reading today. Patient reports that this is due to trying to settle in to the visit. Repeat blood pressure was to goal.  - Continue with cardiology. Stressful life event affecting family   - States her mood is doing well off of Cymbalta. She would like to remain off this medication. She is prescribed Wellbutrin through another provider -weight management. Mood stable per pt. Prediabetes/Obesity, Class III, BMI 40-49.9 (morbid obesity) (HCC)   - Lifestyle modifications such as exercise, weight loss and healthy diet encouraged and reviewed with the pt. Other sleep apnea   - States her CPAP is broken and she is getting this fixed.   States she is not sleeping as well without out it   - Continue with sleep medicine    Seasonal allergic rhinitis due to other allergic trigger/Uncomplicated asthma, unspecified asthma severity, unspecified whether persistent   - Well-controlled. Rare albuterol use. Fibromyalgia   - Continue with rheumatology. Other specified hypothyroidism   - Taking Synthroid. Did not repeat labs as recommended. Agreeable to having these completed. 50 minutes were spent reviewing the chart, coordinating care of the patient and counseling face to face with the patient. Return for as previously scheduled or sooner if needed. Pt will call if symptoms worsen or fail to improve. All questions answered. Pt states no further questions or concerns at this time.    Electronically signed by: JOSE Lopez - HERO 02/22/23

## 2023-02-22 NOTE — TELEPHONE ENCOUNTER
Patient calling she states that she would like to get a \"more thorough\" evaluation of her thyroid with her labs she will be completing tomorrow. She states she would like a T3 and something to test her antibodies. She states she will be coming in at 7:30am to complete the labs tomorrow.

## 2023-02-22 NOTE — TELEPHONE ENCOUNTER
I called and reviewed with the patient. Patient with many questions regarding Hashimoto's versus Graves' disease and additional labs. Patient with many questions regarding hypothyroidism. Extensively discussed diagnoses as well as labs with the patient. Recommend patient have TSH completed and we will discuss results. Patient reports that she would like to see a specialist.  Recommend endocrine referral.  Patient states she would like to think about it and get back to me.     Electronically signed by: JOSE Bahena CNP 02/22/23

## 2023-02-22 NOTE — PATIENT INSTRUCTIONS
Please get your fasting lab work (no food or drink for 10-12 hours prior besides water) completed M-F 730a-430p at our office. [facility] lab has walk-in hours available as well - no appointment is needed. We will call or mychart message you with your results.

## 2023-02-23 DIAGNOSIS — E78.49 OTHER HYPERLIPIDEMIA: ICD-10-CM

## 2023-02-23 DIAGNOSIS — R73.03 PREDIABETES: ICD-10-CM

## 2023-02-23 DIAGNOSIS — E03.8 OTHER SPECIFIED HYPOTHYROIDISM: ICD-10-CM

## 2023-02-23 DIAGNOSIS — I10 PRIMARY HYPERTENSION: ICD-10-CM

## 2023-02-23 LAB
A/G RATIO: 2 (ref 1.1–2.2)
ALBUMIN SERPL-MCNC: 4.5 G/DL (ref 3.4–5)
ALP BLD-CCNC: 102 U/L (ref 40–129)
ALT SERPL-CCNC: 20 U/L (ref 10–40)
ANION GAP SERPL CALCULATED.3IONS-SCNC: 14 MMOL/L (ref 3–16)
AST SERPL-CCNC: 18 U/L (ref 15–37)
BILIRUB SERPL-MCNC: 0.3 MG/DL (ref 0–1)
BUN BLDV-MCNC: 16 MG/DL (ref 7–20)
CALCIUM SERPL-MCNC: 10 MG/DL (ref 8.3–10.6)
CHLORIDE BLD-SCNC: 105 MMOL/L (ref 99–110)
CHOLESTEROL, FASTING: 217 MG/DL (ref 0–199)
CO2: 24 MMOL/L (ref 21–32)
CREAT SERPL-MCNC: 0.9 MG/DL (ref 0.6–1.2)
GFR SERPL CREATININE-BSD FRML MDRD: >60 ML/MIN/{1.73_M2}
GLUCOSE BLD-MCNC: 92 MG/DL (ref 70–99)
HDLC SERPL-MCNC: 51 MG/DL (ref 40–60)
LDL CHOLESTEROL CALCULATED: 125 MG/DL
POTASSIUM SERPL-SCNC: 4.6 MMOL/L (ref 3.5–5.1)
SODIUM BLD-SCNC: 143 MMOL/L (ref 136–145)
TOTAL PROTEIN: 6.7 G/DL (ref 6.4–8.2)
TRIGLYCERIDE, FASTING: 205 MG/DL (ref 0–150)
TSH SERPL DL<=0.05 MIU/L-ACNC: 1.41 UIU/ML (ref 0.27–4.2)
VLDLC SERPL CALC-MCNC: 41 MG/DL

## 2023-02-24 LAB
ESTIMATED AVERAGE GLUCOSE: 114 MG/DL
HBA1C MFR BLD: 5.6 %

## 2023-08-16 LAB
ESTIMATED AVERAGE GLUCOSE: NORMAL
HBA1C MFR BLD: 5.3 %

## 2023-11-15 NOTE — PROGRESS NOTES
[facility]   Electrophysiology Follow up   Date: 11/16/2023  I had the privilege of visiting Konrad Alberto in the office. CC:   Atrial fibrillation   HPI: Konrad Alberto is a 61 y.o. female with a PMH of PAF, anxiety, asthma, HLD, depression, sleep apnea and HTN. She went to urgent care and her ecg showed atrial fibrillation. A monitor was ordered which revealed afib RVR 25% burden     S/p afib ablation 10/2/2020     10/08/2020 presents to the office for urgent appointment. She feels at night that her blood pressure and heart rate are elevated at night time the 150 range. Her BP and heart rate are well controlled today though she has frequent PAC's. We discussed placing a holter monitor to determine what rhythm she is having. Holter Monitor 10/08/2020 to 10/12/2020 Showed Atrial fibrillation burden 26.5% atrial flutter burden  0.9%. Rate controlled. Event monitor 3/30/2021-4/28/2021 showed brief atrial run 11 seconds, no AF, and avg HR 69 (). Monitor 10/2022 showed no atrial fibrillation    Interval History:  Nubia Daly  presents today in annual  follow up. She had some palpitations that she  heard in her head. This has stopped since recovering from Covid     Assessment and plan:   Paroxysmal Atrial fibrillation   EKG Today :  Sinus     S/p RFCA afib with PCI and typical flutter 10/02/2020   Toprol discontinued 12/10/2021 - due to non compliance    - [id number] score: 2 (gender, HTN) ; [id number] Vasc score and anticoagulation discussed. High risk for stroke and thromboembolism. Anticoagulation is reasonable. ~  , no s/s of bleeding - patient  is not taking  Xarelto   - Afib risk factors including age, HTN, obesity, inactivity and TONY were discussed with patient. Risk factor modification recommended  -  30 day  monitor 10/2022 to determine AF burden and need for [facility]  - no atrial fibrillation   - we previously discussed long term monitoring with loop recorder.      HTN  -Controlled/  -BP

## 2023-11-16 ENCOUNTER — OFFICE VISIT (OUTPATIENT)
Dept: CARDIOLOGY CLINIC | Age: 63
End: 2023-11-16
Payer: MEDICARE

## 2023-11-16 VITALS
SYSTOLIC BLOOD PRESSURE: 124 MMHG | WEIGHT: 253.7 LBS | BODY MASS INDEX: 43.31 KG/M2 | OXYGEN SATURATION: 97 % | HEART RATE: 52 BPM | HEIGHT: 64 IN | DIASTOLIC BLOOD PRESSURE: 80 MMHG

## 2023-11-16 DIAGNOSIS — I10 PRIMARY HYPERTENSION: ICD-10-CM

## 2023-11-16 DIAGNOSIS — G47.39 OTHER SLEEP APNEA: ICD-10-CM

## 2023-11-16 DIAGNOSIS — I48.0 PAF (PAROXYSMAL ATRIAL FIBRILLATION) (HCC): Primary | ICD-10-CM

## 2023-11-16 DIAGNOSIS — E66.01 CLASS 3 SEVERE OBESITY DUE TO EXCESS CALORIES WITHOUT SERIOUS COMORBIDITY WITH BODY MASS INDEX (BMI) OF 45.0 TO 49.9 IN ADULT (HCC): ICD-10-CM

## 2023-11-16 PROCEDURE — 93000 ELECTROCARDIOGRAM COMPLETE: CPT | Performed by: INTERNAL MEDICINE

## 2023-11-16 PROCEDURE — 3079F DIAST BP 80-89 MM HG: CPT | Performed by: INTERNAL MEDICINE

## 2023-11-16 PROCEDURE — 3074F SYST BP LT 130 MM HG: CPT | Performed by: INTERNAL MEDICINE

## 2023-11-16 PROCEDURE — 99214 OFFICE O/P EST MOD 30 MIN: CPT | Performed by: INTERNAL MEDICINE

## 2023-11-16 RX ORDER — OMEPRAZOLE 20 MG/1
20 CAPSULE, DELAYED RELEASE ORAL
Qty: 30 CAPSULE | Refills: 0
Start: 2023-11-16

## 2024-04-04 ENCOUNTER — TELEPHONE (OUTPATIENT)
Dept: PHARMACY | Facility: CLINIC | Age: 64
End: 2024-04-04

## 2024-04-04 NOTE — TELEPHONE ENCOUNTER
[facility] CLINICAL PHARMACY: ADHERENCE REVIEW  Identified care gap per [place]: fills at [facility]: ACE/ARB adherence    Patient also appears to be prescribed: ACE/ARB    ASSESSMENT    ACE/ARB ADHERENCE    Insurance Records claims through 24 (Prior Year PDC = not reported; YTD PDC = FIRST FILL  BENAZEPRIL HCL 20 MG TABLET last filled on 24 for 30 day supply. Next refill due: 03/15/24    Prescribed si tablet/capsule daily    Per Reconcile Dispense History: last filled on 24 for 30 day supply.     Per [facility]:  0 refills remaining.  Pharmacy will send a refill request     BP Readings from Last 3 Encounters:   23 124/80   23 124/82   22 124/70     CrCl cannot be calculated (Patient's most recent lab result is older than the maximum 180 days allowed.).  Lab Results   Component Value Date    CREATININE 0.9 2023     Lab Results   Component Value Date    K 4.6 2023       PLAN  The following are interventions that have been identified:   Patient overdue refilling BENAZEPRIL HCL 20 MG TABLET. Unsure if patient is still prescribed this medication.   Patient NEEDS REFILLS for BENAZEPRIL HCL 20 MG TABLET    Attempting to reach patient to review.  Left message asking for return call. Patient agrees for 100 day supply rxs when able.  Called patient to see if she is still taking BENAZEPRIL HCL 20 MG TABLET and     Recent Visits  Date Type Provider Dept   23 Office Visit Lenora Zapata APRN - CNP [facility]   22 Office Visit Lenora Zapata APRN - CNP [facility]   Showing recent visits within past 540 days with a meds authorizing provider and meeting all other requirements  Future Appointments  No visits were found meeting these conditions.  Showing future appointments within next 150 days with a meds authorizing provider and meeting all other requirements    Future Appointments   Date Time Provider Department Center

## 2024-04-18 LAB
ESTIMATED AVERAGE GLUCOSE: NORMAL
HBA1C MFR BLD: 5.8 %

## 2024-06-27 ENCOUNTER — OFFICE VISIT (OUTPATIENT)
Dept: PULMONOLOGY | Age: 64
End: 2024-06-27
Payer: MEDICARE

## 2024-06-27 VITALS
HEIGHT: 64 IN | WEIGHT: 262 LBS | HEART RATE: 65 BPM | DIASTOLIC BLOOD PRESSURE: 68 MMHG | SYSTOLIC BLOOD PRESSURE: 110 MMHG | BODY MASS INDEX: 44.73 KG/M2 | OXYGEN SATURATION: 96 %

## 2024-06-27 DIAGNOSIS — I10 PRIMARY HYPERTENSION: ICD-10-CM

## 2024-06-27 DIAGNOSIS — E66.01 MORBID OBESITY WITH BMI OF 40.0-44.9, ADULT (HCC): ICD-10-CM

## 2024-06-27 DIAGNOSIS — G47.33 OSA (OBSTRUCTIVE SLEEP APNEA): Primary | ICD-10-CM

## 2024-06-27 PROCEDURE — 99204 OFFICE O/P NEW MOD 45 MIN: CPT | Performed by: STUDENT IN AN ORGANIZED HEALTH CARE EDUCATION/TRAINING PROGRAM

## 2024-06-27 PROCEDURE — 3078F DIAST BP <80 MM HG: CPT | Performed by: STUDENT IN AN ORGANIZED HEALTH CARE EDUCATION/TRAINING PROGRAM

## 2024-06-27 PROCEDURE — 3074F SYST BP LT 130 MM HG: CPT | Performed by: STUDENT IN AN ORGANIZED HEALTH CARE EDUCATION/TRAINING PROGRAM

## 2024-06-27 RX ORDER — AMLODIPINE BESYLATE 5 MG/1
5 TABLET ORAL DAILY
COMMUNITY

## 2024-06-27 ASSESSMENT — SLEEP AND FATIGUE QUESTIONNAIRES
HOW LIKELY ARE YOU TO NOD OFF OR FALL ASLEEP WHILE SITTING QUIETLY AFTER LUNCH WITHOUT ALCOHOL: SLIGHT CHANCE OF DOZING
HOW LIKELY ARE YOU TO NOD OFF OR FALL ASLEEP WHILE WATCHING TV: MODERATE CHANCE OF DOZING
HOW LIKELY ARE YOU TO NOD OFF OR FALL ASLEEP WHILE SITTING AND READING: WOULD NEVER DOZE
HOW LIKELY ARE YOU TO NOD OFF OR FALL ASLEEP WHILE SITTING AND TALKING TO SOMEONE: WOULD NEVER DOZE
HOW LIKELY ARE YOU TO NOD OFF OR FALL ASLEEP WHILE LYING DOWN TO REST IN THE AFTERNOON WHEN CIRCUMSTANCES PERMIT: HIGH CHANCE OF DOZING
HOW LIKELY ARE YOU TO NOD OFF OR FALL ASLEEP WHEN YOU ARE A PASSENGER IN A CAR FOR AN HOUR WITHOUT A BREAK: MODERATE CHANCE OF DOZING
ESS TOTAL SCORE: 10
HOW LIKELY ARE YOU TO NOD OFF OR FALL ASLEEP IN A CAR, WHILE STOPPED FOR A FEW MINUTES IN TRAFFIC: SLIGHT CHANCE OF DOZING
NECK CIRCUMFERENCE (INCHES): 17.25

## 2024-06-27 NOTE — PROGRESS NOTES
[facility]  Sleep Medicine  [address], [address]  [place] [address]    Chief Complaint   Patient presents with    Snoring     Also stops breathing while sleeping    Fatigue       Beronica Jenkins is a 63 y.o. female who comes in for sleep evaluation.  Her complaints include daytime sleepiness, snoring, daytime fatigue, previously diagnosed TONY, and poor sleep. She was diagnosed with TONY in 2008 and used to be on PAP therapy but not recently. She wants to treat her TONY with PAP therapy. She wants to be restarted on her current machine but needs new supplies.    Pertinent PMHx includes: TONY, hypertension, asthma, prediabetes, fibromyalgia, GERD, severe obesity.    She goes to bed at 10pm. She falls asleep in  about 20 minutes.  She awakens about 3 times per night. She awakens at around 6am. The average total amount of sleep is about 5-6 hours per night. She does not use sleep aid/s. She does take daytime naps (mostly unintention). She describes the symptoms as daytime sleepiness, fatigue, snoring, disrupted sleep, difficulty staying asleep, non refreshed sleep , and morning dry mouth.  She might have symptoms that fulfill the criteria for restless legs syndrome but this could be from neuropathy as well. She has not dozed off while driving. She denies recent significant weight gain. Previous evaluation and treatment has included: PSG and PAP therapy.    Caffeinated drinks/day: 1 cup of coffee in the AM and a glass of tea at dinner  Alcohol intake/day/week: none  Occupation: retired       DATA REVIEWED TODAY:  [place] & Insomnia Severity Index scores      6/27/2024     1:38 PM   Sleep Medicine   Sitting and reading 0   Watching TV 2   Sitting, inactive in a public place (e.g. a theatre or a meeting) 1   As a passenger in a car for an hour without a break 2   Lying down to rest in the afternoon when circumstances permit 3   Sitting and talking to someone 0   Sitting quietly after a lunch without

## 2024-06-27 NOTE — PATIENT INSTRUCTIONS
and lower headgear clips, with a magnetic field strength of up to 400mT. When worn, they connect to secure the mask but may inadvertently detach while asleep.  Implants/medical devices, including those listed within contraindications, may be adversely affected if they change function under external magnetic fields or contain ferromagnetic materials that attract/repel to magnetic fields (some metallic implants, e.g., contact lenses with metal, dental implants, metallic cranial plates, screws, eliana hole covers, and bone substitute devices). Consult your physician and  of your implant / other medical device for information on the potential adverse effects of magnetic fields.  Note, not all models or variants of medical devices listed in the contraindications are affected by external magnetic fields. If you are not sure whether an implant or medical device falls under the contraindications, or you require additional information on the potential adverse effects of magnetic fields for your particular implant or medical device, please contact your physician/doctor.    Contact Information   Customers in the [place] with questions about this recall should contact [facility] at call [phone].

## 2024-06-28 ENCOUNTER — TELEPHONE (OUTPATIENT)
Dept: SLEEP CENTER | Age: 64
End: 2024-06-28

## 2024-06-28 NOTE — TELEPHONE ENCOUNTER
Called to schedule a hst per Jarred     Left vm for the pt to return my call     [employer] medicare insurance

## 2024-07-23 ENCOUNTER — HOSPITAL ENCOUNTER (OUTPATIENT)
Dept: SLEEP CENTER | Age: 64
Discharge: HOME | End: 2024-07-23
Attending: STUDENT IN AN ORGANIZED HEALTH CARE EDUCATION/TRAINING PROGRAM
Payer: MEDICARE

## 2024-07-23 DIAGNOSIS — E66.01 MORBID OBESITY WITH BMI OF 40.0-44.9, ADULT (HCC): ICD-10-CM

## 2024-07-23 DIAGNOSIS — I10 PRIMARY HYPERTENSION: ICD-10-CM

## 2024-07-23 DIAGNOSIS — G47.33 OSA (OBSTRUCTIVE SLEEP APNEA): ICD-10-CM

## 2024-07-23 PROCEDURE — 95806 SLEEP STUDY UNATT&RESP EFFT: CPT

## 2024-07-30 ENCOUNTER — TELEPHONE (OUTPATIENT)
Dept: PULMONOLOGY | Age: 64
End: 2024-07-30

## 2024-07-30 NOTE — TELEPHONE ENCOUNTER
Pt called stating Dr. Stern had sent the order for her to have a HST done. Pt stated her insurance had denied her SS and she was unable to do it. Pt would really like to have HST done and was wondering if Dr. Stern could send a letter to appeal.     Ph. [phone]

## 2024-08-12 NOTE — TELEPHONE ENCOUNTER
I will contact patient to discuss about HST showing severe TONY.    Patient already has a CPAP device.  She will need to be restarted with an adjusted pressure of 5-20 cmH2O and will need new supplies.  Pressure settings may be set by RT or DME company.  Patient should be scheduled for 31 to 90 days follow up.    I attempted to reach patient by phone but unsuccessfully.  I left a voicemail asking patient to call us back and let us know the best time she can be reached by phone.    Thanks  Leonard Cohn MD

## 2024-08-12 NOTE — TELEPHONE ENCOUNTER
Pt called returning Dr. Stern's call. Pt stated she is at work currently but could be reached at 3:15 pm today or later.    Ph. [phone]

## 2024-08-12 NOTE — PROGRESS NOTES
Diagnosis: [x] TONY  (G47.33) [] CSA (G47.31) []  Other:__________________   Length of Need: [] 13 months [x]  99 Months                                          []  Other:__________________   Machine (MAREN): [] Respironics Auto (with modem for remote monitoring)       [] Other:____________________    []  ResMed Auto (with modem for remote monitoring)    []  CPAP () [] Bilevel ()   Mode: Mode:   [] Auto [] Fixed [] Auto [] Spontaneous   Pmin:_________cmH2O      Pmax:_________cmH2O   P:_________cmH2O    EPAPmin:__________cmH2O IPAP:__________cmH2O     IPAPmax:__________cmH2O EPAP:__________cmH2O     PSmin:_______  PSmax:_______       (ResMed) PS:_________     Flex/EPR - 3 full time                          Ramp time: 30 min Flex/EPR - 3 full time                 Ramp time: 30 min   Ramp Pressure:___________cmH2O Ramp Pressure:____________cmH2O         Humidifier: [x] Heated ()                                               [] Passive     [x] Water chamber replacement ()/ 1 per 6 months   Mask:   [] Nasal () /1 per 3 months [x] Full Face () /1 per 3 months   [] Patient choice -Size and fit mask [x] Patient Choice - Size and fit mask   [] Dispense: nasal cushion  [] Dispense:  [x] Dispense: full face mask  [] Dispense:    [] Headgear () / 1 per 3 months [x] Headgear () / 1 per 3 months   [] Replacement Nasal Cushion ()/2 per month [x] Interface Replacement ()/1 per month   [] Replacement Nasal Pillows ()/2 per month       Tubing: [x] Heated ()/1 per 3 months                           [] Standard ()/1 per 3 months  [] Other:____________________   Filters: [x] Non-disposable ()/1 per 6 months                 [x] Ultra-Fine, Disposable ()/2 per month     Miscellaneous: [x] Chin Strap as needed ()/ 1 per 6months      [] Other:__________________________________         Start Order Date: 08/12/24    MEDICAL JUSTIFICATION:  I, the undersigned,

## 2024-08-12 NOTE — TELEPHONE ENCOUNTER
I spoke to patient regarding sleep test results.  She is agreeable to proceed with mask fitting appointment with [facility] and receiving new supplies for her current CPAP.  She reports her current device is set for 15-20 cmH2O, which is appropriate.  Order for supplies and mask fitting sent to pharmacy. She needs a 31-90-day follow-up visit.      Leonard Cohn MD

## 2024-08-16 NOTE — TELEPHONE ENCOUNTER
Pt called back. Her machine isn't working and thinks it needs \"reset\". Number given for Sweta with [phone].

## 2024-08-19 ENCOUNTER — TELEPHONE (OUTPATIENT)
Dept: PULMONOLOGY | Age: 64
End: 2024-08-19

## 2024-08-19 NOTE — TELEPHONE ENCOUNTER
Pt called stating she has still not received cpap supplies from [facility]. Faxed over OV notes, SS results, and order and they state they still have not received anything. Can status of order be checked at [facility]? Pt was also asking if Dr. Stern could write a note for her explaining why she missed work today due to her not being able to sleep because she can't use her machine due to not having supplies. Pt asked if the note could be sent via email to the address on file.     Ph. [phone]

## 2024-08-19 NOTE — TELEPHONE ENCOUNTER
Patient also called to say she would like the note to cover tomorrow as well.  After explaining that unless patient had a medical reason (picking up equipment) that we could not have a note written for tomorrow.

## 2024-08-20 NOTE — TELEPHONE ENCOUNTER
Pt called stating she took her machine to [facility] because a piece from the back had broken off. [facility] is now saying she would need a new CPAP and would need an order for a new one faxed to their office.     Ph. [phone]

## 2024-08-20 NOTE — PROGRESS NOTES
Diagnosis: [x] TONY  (G47.33) [] CSA (G47.31) []  Other:__________________   Length of Need: [] 13 months [x]  99 Months                                          []  Other:__________________   Machine (MAREN): [] Respironics Auto (with modem for remote monitoring)       [] Other:____________________    [x]  ResMed Auto (with modem for remote monitoring)    [x]  CPAP () [] Bilevel ()   Mode: Mode:   [x] Auto [] Fixed [] Auto [] Spontaneous   Pmin:_____5____cmH2O      Pmax:_____20____cmH2O   P:_________cmH2O    EPAPmin:__________cmH2O IPAP:__________cmH2O     IPAPmax:__________cmH2O EPAP:__________cmH2O     PSmin:_______  PSmax:_______       (ResMed) PS:_________     Flex/EPR - 3 full time                          Ramp time: 30 min Flex/EPR - 3 full time                 Ramp time: 30 min   Ramp Pressure:_____4______cmH2O Ramp Pressure:____________cmH2O         Humidifier: [x] Heated ()                                               [] Passive     [x] Water chamber replacement ()/ 1 per 6 months   Mask:   [] Nasal () /1 per 3 months [x] Full Face () /1 per 3 months   [] Patient choice -Size and fit mask [x] Patient Choice - Size and fit mask   [] Dispense: nasal cushion  [] Dispense:  [x] Dispense: full face mask  [] Dispense:    [] Headgear () / 1 per 3 months [x] Headgear () / 1 per 3 months   [] Replacement Nasal Cushion ()/2 per month [x] Interface Replacement ()/1 per month   [] Replacement Nasal Pillows ()/2 per month       Tubing: [x] Heated ()/1 per 3 months                           [] Standard ()/1 per 3 months  [] Other:____________________   Filters: [x] Non-disposable ()/1 per 6 months                 [x] Ultra-Fine, Disposable ()/2 per month     Miscellaneous: [x] Chin Strap as needed ()/ 1 per 6months      [] Other:__________________________________         Start Order Date: 08/20/24    MEDICAL JUSTIFICATION:  I, the

## 2024-10-28 ASSESSMENT — SLEEP AND FATIGUE QUESTIONNAIRES
HOW LIKELY ARE YOU TO NOD OFF OR FALL ASLEEP WHILE LYING DOWN TO REST IN THE AFTERNOON WHEN CIRCUMSTANCES PERMIT: SLIGHT CHANCE OF DOZING
HOW LIKELY ARE YOU TO NOD OFF OR FALL ASLEEP WHILE WATCHING TV: SLIGHT CHANCE OF DOZING
HOW LIKELY ARE YOU TO NOD OFF OR FALL ASLEEP IN A CAR, WHILE STOPPED FOR A FEW MINUTES IN TRAFFIC: SLIGHT CHANCE OF DOZING
HOW LIKELY ARE YOU TO NOD OFF OR FALL ASLEEP WHILE SITTING INACTIVE IN A PUBLIC PLACE: SLIGHT CHANCE OF DOZING
HOW LIKELY ARE YOU TO NOD OFF OR FALL ASLEEP IN A CAR, WHILE STOPPED FOR A FEW MINUTES IN TRAFFIC: SLIGHT CHANCE OF DOZING
HOW LIKELY ARE YOU TO NOD OFF OR FALL ASLEEP WHILE SITTING INACTIVE IN A PUBLIC PLACE: SLIGHT CHANCE OF DOZING
HOW LIKELY ARE YOU TO NOD OFF OR FALL ASLEEP WHILE SITTING QUIETLY AFTER LUNCH WITHOUT ALCOHOL: SLIGHT CHANCE OF DOZING
HOW LIKELY ARE YOU TO NOD OFF OR FALL ASLEEP WHILE WATCHING TV: SLIGHT CHANCE OF DOZING
HOW LIKELY ARE YOU TO NOD OFF OR FALL ASLEEP WHILE SITTING AND READING: MODERATE CHANCE OF DOZING
HOW LIKELY ARE YOU TO NOD OFF OR FALL ASLEEP WHILE SITTING AND TALKING TO SOMEONE: WOULD NEVER DOZE
HOW LIKELY ARE YOU TO NOD OFF OR FALL ASLEEP WHILE LYING DOWN TO REST IN THE AFTERNOON WHEN CIRCUMSTANCES PERMIT: SLIGHT CHANCE OF DOZING
HOW LIKELY ARE YOU TO NOD OFF OR FALL ASLEEP WHEN YOU ARE A PASSENGER IN A CAR FOR AN HOUR WITHOUT A BREAK: WOULD NEVER DOZE
HOW LIKELY ARE YOU TO NOD OFF OR FALL ASLEEP WHILE SITTING AND TALKING TO SOMEONE: WOULD NEVER DOZE
ESS TOTAL SCORE: 7
HOW LIKELY ARE YOU TO NOD OFF OR FALL ASLEEP WHILE SITTING AND READING: MODERATE CHANCE OF DOZING
HOW LIKELY ARE YOU TO NOD OFF OR FALL ASLEEP WHILE SITTING QUIETLY AFTER LUNCH WITHOUT ALCOHOL: SLIGHT CHANCE OF DOZING
HOW LIKELY ARE YOU TO NOD OFF OR FALL ASLEEP WHEN YOU ARE A PASSENGER IN A CAR FOR AN HOUR WITHOUT A BREAK: WOULD NEVER DOZE

## 2024-10-29 ENCOUNTER — OFFICE VISIT (OUTPATIENT)
Age: 64
End: 2024-10-29

## 2024-10-29 VITALS
BODY MASS INDEX: 45.17 KG/M2 | DIASTOLIC BLOOD PRESSURE: 78 MMHG | HEIGHT: 64 IN | HEART RATE: 54 BPM | WEIGHT: 264.55 LBS | OXYGEN SATURATION: 95 % | SYSTOLIC BLOOD PRESSURE: 122 MMHG

## 2024-10-29 DIAGNOSIS — G47.33 OSA ON CPAP: Primary | ICD-10-CM

## 2024-10-29 DIAGNOSIS — G47.34 NOCTURNAL HYPOXEMIA: ICD-10-CM

## 2024-10-29 DIAGNOSIS — I10 PRIMARY HYPERTENSION: ICD-10-CM

## 2024-10-29 NOTE — PROGRESS NOTES
Payor Plan Insurance Group Employer/Plan Group    [facility] MEDICARE ANTHEM MEDIBLUE ESSENTIAL/PLUS [id number]       Payor Plan Address Payor Plan Phone Number Payor Plan Fax Number Effective Dates    [address]   1/1/2022 - None Entered    [facility] [id number]         Subscriber Name Subscriber Birth Date Member ID       SUNSHINE WASHINGTON 1960 [id number]                      Electronically signed by Leonard Cohn MD on 10/29/2024 at 1:42 PM.

## 2024-10-29 NOTE — PROGRESS NOTES
[facility]  Sleep Medicine  [address], [address]  [place] [address]    Chief Complaint   Patient presents with    Sleep Apnea         Beronica Jenkins comes in today for sleep apnea follow-up . She was diagnosed with severe obstructive sleep apnea and is being treated with PAP therapy.   She has been on PAP therapy for almost 2 months.  She states she wears the PAP device every night.     She falls asleep in  few minutes.  She awakens about 3 times per night. The average total amount of sleep is about 5 hours per night and takes no naps. She does not use sleep aid/s. Symptoms of sleep apnea have improved since using PAP therapy.   She is not snoring with the machine on. She does complain of hunger for air and mask fit / discomfort issues.   DME: [employer]  Mask: AirFit F20 medium  Machine: ResMed Airsense 11 Autoset      DATA REVIEWED TODAY:    Diagnostic Review  HST on 2024 showed respiratory event index of 97.2/h with oxygen desaturation down to a kuldip of 65%.     [place]           10/28/2024     6:02 PM 2024     1:38 PM   Sleep Medicine   Sitting and reading 2 0   Watching TV 1 2   Sitting, inactive in a public place (e.g. a theatre or a meeting) 1 1   As a passenger in a car for an hour without a break 0 2   Lying down to rest in the afternoon when circumstances permit 1 3   Sitting and talking to someone 0 0   Sitting quietly after a lunch without alcohol 1 1   In a car, while stopped for a few minutes in traffic 1 1   [place] Sleepiness Score 7 10   Neck (Inches)  17.25       PAP Adherence (dates: 9/3/2024 - 10/27/2024)  Total days used: 55/55  % used days >= 4 hours: 87%  Average hours used per day: 6 hrs 8 mins  Mode: auto CPAP  Pressure settin-20 cmH2O  Average pressure (95%): 17.4 cmH2O  Leaks (95%): 14.3 L/min  Residual AHI: 3.8      Medications   She has a current medication list which includes the following prescription(s): amlodipine, omeprazole,

## 2024-10-29 NOTE — PATIENT INSTRUCTIONS
TONY education:    You have obstructive sleep apnea (TONY):    1. Obstructive sleep apnea (TONY) is a condition where the upper airway narrows or closes intermittently during sleep. This can lead to drops in your oxygen levels during sleep, arousals from sleep, and excessive daytime sleepiness.     2. Untreated TONY is associated with increased risk of hypertension, cardiac disease, myocardial infarction, stroke, and poor blood sugar control. Treating your TONY can decrease these risks.    3. Weight loss does improve sleep apnea. It is important to have a healthy diet and an exercise program.     4. Alcohol and sedating medicine can make TONY worse and should be avoided in particular before sleep.    5. If you have surgery or are hospitalized, tell your doctor that you have TONY and bring your CPAP to the hospital.    6. If you are drowsy or sleepy, you should not drive. If you are driving and become drowsy or sleepy, you should pull over to a safe place. Below are our drowsy driving tips.     PAP machine care:   You should clean your PAP regularly (see your PAP  site for more details)  Daily cleaning   1. Wipe mask with a damp towel with mild detergent, rinse with a damp towel and let air dry. You can also see CPAP cleaning wipes. Avoid harsh cleaning wipes or chemicals.    2. Humidifier- empty water daily. Fill with clean distilled water before use before sleep.    Weekly Cleaning   1. Clean mask, headgear, and tubing weekly  2. Fill your sink with warm water and a few drops of mild dish soap. Swirl equipment for at least 5 minutes. Rinse well and air dry. Hang hose over something so the water droplets drip out.   3. Clean filter- rinse with warm water, squeeze excess water and blot dry with towel. If there is a white filter (respironics machine)- do not wash that - it is disposable and should be changed once a month.   4. Humidifier- Disinfect in solution that is one part vinegar and 5 parts water for 30

## 2024-12-17 ENCOUNTER — TELEPHONE (OUTPATIENT)
Dept: CARDIOLOGY CLINIC | Age: 64
End: 2024-12-17

## 2025-01-23 ENCOUNTER — COMMUNITY OUTREACH (OUTPATIENT)
Dept: INTERNAL MEDICINE CLINIC | Age: 65
End: 2025-01-23

## 2025-01-23 NOTE — PROGRESS NOTES
Patient's  shows they are overdue for Hemoglobin A1C  Care Everywhere and  files searched.   updated with 4/18/24 A1C result.

## 2025-02-14 ENCOUNTER — TELEPHONE (OUTPATIENT)
Dept: PULMONOLOGY | Age: 65
End: 2025-02-14

## 2025-02-14 NOTE — TELEPHONE ENCOUNTER
I spoke to patient regarding overnight oximetry test showing normal oxygen saturation but it was interrupted for several hours. Patient reports she has been struggling with sleeping throughout the night. Recently it has improved though. Patient should continue with PAP therapy.  We will repeat oximetry in a few months. Patient endorses understanding.    Patient also needs new supplies so I will send the order to [facility].    Leonard Cohn MD

## 2025-02-14 NOTE — PROGRESS NOTES
EPAPmin:__________cmH2O IPAP:__________cmH2O     IPAPmax:__________cmH2O EPAP:__________cmH2O     PSmin:_______  PSmax:_______       (ResMed) PS:_________     Flex/EPR - 3 full time                          Ramp time: 30 min Flex/EPR - 3 full time                 Ramp time: 30 min   Ramp Pressure:___________cmH2O Ramp Pressure:____________cmH2O         Humidifier: [x] Heated ()                                               [] Passive     [x] Water chamber replacement ()/ 1 per 6 months   Mask:   [x] Nasal () /1 per 3 months [] Full Face () /1 per 3 months   [x] Patient choice -Size and fit mask [] Patient Choice - Size and fit mask   [x] Dispense: nasal pillow  [] Dispense:  [] Dispense:    [x] Headgear () / 1 per 3 months [] Headgear () / 1 per 3 months   [] Replacement Nasal Cushion ()/2 per month [] Interface Replacement ()/1 per month   [x] Replacement Nasal Pillows ()/2 per month       Tubing: [x] Heated ()/1 per 3 months                           [] Standard ()/1 per 3 months  [] Other:____________________   Filters: [x] Non-disposable ()/1 per 6 months                 [x] Ultra-Fine, Disposable ()/2 per month     Miscellaneous: [x] Chin Strap as needed ()/ 1 per 6months      [] Other:__________________________________         Start Order Date: 02/14/25    MEDICAL JUSTIFICATION:  I, the undersigned, certify that the above prescribed supplies are medically necessary for this patient’s wellbeing.  In my opinion, the supplies are both reasonable and necessary in reference to accepted standards of medicalpractice in treatment of this patient’s condition.    Leonard Cohn MD    NPI: [id number]       Order Signed Date: 02/14/25      Beronica Jenkins  1960  [address]  [place] [address]  [phone] (home) [phone] (work)  [phone] (mobile)    Insurance:   Active Insurance as of 2/14/2025       Primary Coverage       Payor

## 2025-02-18 NOTE — PROGRESS NOTES
[facility]   Electrophysiology Follow up   Date: 2/25/2025  I had the privilege of visiting Beronica Jenkins in the office.     CC:   Atrial fibrillation   HPI: Beronica Jenkins is a 64 y.o. female with a PMH of PAF, anxiety, asthma, HLD, depression, sleep apnea and HTN. She went to urgent care and her ecg showed atrial fibrillation. A monitor was ordered which revealed afib RVR 25% burden     S/p afib ablation 10/2/2020     10/08/2020 presents to the office for urgent appointment. She feels at night that her blood pressure and heart rate are elevated at night time the 150 range. Her BP and heart rate are well controlled today though she has frequent PAC's. We discussed placing a holter monitor to determine what rhythm she is having.      Holter Monitor 10/08/2020 to 10/12/2020 Showed Atrial fibrillation burden 26.5% atrial flutter burden  0.9%. Rate controlled.     Event monitor 3/30/2021-4/28/2021 showed brief atrial run 11 seconds, no AF, and avg HR 69 ().     Monitor 10/2022 showed no atrial fibrillation    Interval History:    Beronica presents today in annual follow up.   History of Present Illness  The patient presents for evaluation of atrial fibrillation and hypertension.    She is currently on a regimen of amlodipine for blood pressure management. She has been actively engaged in physical therapy, which she is scheduled to complete on Wednesday. Her therapeutic regimen included an injection in her back several months prior. She has recently initiated a gym routine, with her first session today, and plans to continue this three times a week, supplemented by water aerobics twice a week. She is also taking duloxetine for anxiety. She expresses a desire to increase her heart rate during exercise to a range of 100 to 106.    MEDICATIONS  Amlodipine, duloxetine.    Assessment & Plan        Paroxysmal Atrial fibrillation   -EKG today shows SR   -S/p RFCA afib with PCI and typical flutter

## 2025-02-25 ENCOUNTER — OFFICE VISIT (OUTPATIENT)
Dept: CARDIOLOGY CLINIC | Age: 65
End: 2025-02-25
Payer: MEDICARE

## 2025-02-25 VITALS
SYSTOLIC BLOOD PRESSURE: 136 MMHG | HEIGHT: 64 IN | OXYGEN SATURATION: 94 % | WEIGHT: 270.4 LBS | DIASTOLIC BLOOD PRESSURE: 80 MMHG | HEART RATE: 82 BPM | BODY MASS INDEX: 46.16 KG/M2

## 2025-02-25 DIAGNOSIS — I48.0 PAF (PAROXYSMAL ATRIAL FIBRILLATION) (HCC): Primary | ICD-10-CM

## 2025-02-25 DIAGNOSIS — I10 PRIMARY HYPERTENSION: ICD-10-CM

## 2025-02-25 PROCEDURE — 3079F DIAST BP 80-89 MM HG: CPT | Performed by: INTERNAL MEDICINE

## 2025-02-25 PROCEDURE — 99214 OFFICE O/P EST MOD 30 MIN: CPT | Performed by: INTERNAL MEDICINE

## 2025-02-25 PROCEDURE — 1036F TOBACCO NON-USER: CPT | Performed by: INTERNAL MEDICINE

## 2025-02-25 PROCEDURE — G8427 DOCREV CUR MEDS BY ELIG CLIN: HCPCS | Performed by: INTERNAL MEDICINE

## 2025-02-25 PROCEDURE — G8417 CALC BMI ABV UP PARAM F/U: HCPCS | Performed by: INTERNAL MEDICINE

## 2025-02-25 PROCEDURE — 93000 ELECTROCARDIOGRAM COMPLETE: CPT | Performed by: INTERNAL MEDICINE

## 2025-02-25 PROCEDURE — G2211 COMPLEX E/M VISIT ADD ON: HCPCS | Performed by: INTERNAL MEDICINE

## 2025-02-25 PROCEDURE — 3075F SYST BP GE 130 - 139MM HG: CPT | Performed by: INTERNAL MEDICINE

## 2025-02-25 PROCEDURE — 3017F COLORECTAL CA SCREEN DOC REV: CPT | Performed by: INTERNAL MEDICINE

## (undated) DEVICE — SET VLV 3 PC AWS DISPOSABLE GRDIAN SCOPEVALET

## (undated) DEVICE — PROCEDURE KIT ENDOSCP CUST

## (undated) DEVICE — SOLUTION IV IRRIG WATER 500ML POUR BRL ST 2F7113

## (undated) DEVICE — TRAP SPEC RETRV CLR PLAS POLYP IN LN SUCT QUIK CTCH

## (undated) DEVICE — SNARE ENDOSCP L240CM SHTH DIA24MM LOOP W10MM POLYP RND REINF

## (undated) DEVICE — BW-412T DISP COMBO CLEANING BRUSH: Brand: SINGLE USE COMBINATION CLEANING BRUSH